# Patient Record
Sex: MALE | Race: BLACK OR AFRICAN AMERICAN | NOT HISPANIC OR LATINO | Employment: UNEMPLOYED | URBAN - METROPOLITAN AREA
[De-identification: names, ages, dates, MRNs, and addresses within clinical notes are randomized per-mention and may not be internally consistent; named-entity substitution may affect disease eponyms.]

---

## 2019-02-14 ENCOUNTER — OFFICE VISIT (OUTPATIENT)
Dept: FAMILY MEDICINE | Facility: CLINIC | Age: 34
End: 2019-02-14
Payer: COMMERCIAL

## 2019-02-14 VITALS
DIASTOLIC BLOOD PRESSURE: 69 MMHG | HEART RATE: 64 BPM | OXYGEN SATURATION: 98 % | HEIGHT: 74 IN | SYSTOLIC BLOOD PRESSURE: 109 MMHG | BODY MASS INDEX: 27.59 KG/M2 | TEMPERATURE: 98.2 F | WEIGHT: 215 LBS | RESPIRATION RATE: 16 BRPM

## 2019-02-14 DIAGNOSIS — R21 RASH AND NONSPECIFIC SKIN ERUPTION: Primary | ICD-10-CM

## 2019-02-14 SDOH — HEALTH STABILITY: MENTAL HEALTH: HOW OFTEN DO YOU HAVE A DRINK CONTAINING ALCOHOL?: NOT ASKED

## 2019-02-14 ASSESSMENT — MIFFLIN-ST. JEOR: SCORE: 1989.98

## 2019-02-14 ASSESSMENT — ENCOUNTER SYMPTOMS
MUSCULOSKELETAL NEGATIVE: 1
RESPIRATORY NEGATIVE: 1
CONSTITUTIONAL NEGATIVE: 1
GASTROINTESTINAL NEGATIVE: 1
CARDIOVASCULAR NEGATIVE: 1

## 2019-02-14 NOTE — PATIENT INSTRUCTIONS
Here is the plan from today's visit    1. Rash and nonspecific skin eruption  - DERMATOLOGY REFERRAL - INTERNAL  [facility]: [facility] [phone]      Thank you for coming to [facility] today.  Lab Testing:  **If you had lab testing today and your results are reassuring or normal they will be mailed to you or sent through [facility] within 7 days.   **If the lab tests need quick action we will call you with the results.  The phone number we will call with results is # [phone] (home) . If this is not the best number please call our clinic and change the number.  Medication Refills:  If you need any refills please call your pharmacy and they will contact us.   If you need to  your refill at a new pharmacy, please contact the new pharmacy directly. The new pharmacy will help you get your medications transferred faster.   Scheduling:  If you have any concerns about today's visit or wish to schedule another appointment please call our office during normal business hours [phone] (8-5:00 M-F)  If a referral was made to a [facility] and you don't get a call from central scheduling please call [phone].  If a Mammogram was ordered for you at [facility] call [phone] to schedule or change your appointment.  If you had an XRay/CT/Ultrasound/MRI ordered the number is [phone] to schedule or change your radiology appointment.   Medical Concerns:  If you have urgent medical concerns please call [phone] at any time of the day.    Doris Loera MD

## 2019-02-14 NOTE — PROGRESS NOTES
MARIAMA Sandhu is a 33 year old  who presents for   Chief Complaint   Patient presents with     Derm Problem     on arms since 2012       Rash/Lesion  Onset: 2012, while living in [place] he developed this rash. Initially it was on various body parts intermittently coming and going, including face, arms and legs. For the past 3 years it has persistently been on his right forearm and no where else.      Description:   Location: right forearm  Color: skin colored with slight erythema and scaliness   Character: small raised papules, persistent  Itching (Pruritis): Yes Details: worse in the cold  Pain?:no    Progression of Symptoms:  same    Accompanying Signs & Symptoms:  Fever: no  Body aches or joint pain:  no  Sore throat symptoms:no  Recent cold symptoms: no    History:   Previous similar rash: Yes Details: ongoing for many years    Precipitating factors:   Exposure to similar rash: no  New exposures: {no  Recent travel: Yes Details: recently moved here from [place]  New Medication: no    What makes it better?:  nothing  Therapies Tried and outcome:  Has been prescribed various creams, Details: no change    +++++++    Problem, Medication and Allergy Lists were reviewed and updated if needed..    Patient is a new patient to this clinic and so  I reviewed/updated the Past Medical History, the Family History and the Social History .  Family History : Mother, father and siblings healthy.  Social History : Moved to the  from [facility] a few years ago, moved to [place] from [place] 1 month ago. , no children. Non-smoker.         Review of Systems:   Review of Systems   Constitutional: Negative.    HENT: Negative.    Respiratory: Negative.    Cardiovascular: Negative.    Gastrointestinal: Negative.    Musculoskeletal: Negative.    Skin: Positive for rash.            Physical Exam:     Vitals:    02/14/19 1601   BP: 109/69   Pulse: 64   Resp: 16   Temp: 98.2  F (36.8  C)   TempSrc: Oral   SpO2: 98%  "  Weight: 97.5 kg (215 lb)   Height: 1.88 m (6' 2\")     Body mass index is 27.6 kg/m .  Vitals were reviewed and were normal     Physical Exam   Constitutional: He is oriented to person, place, and time. He appears well-developed and well-nourished. No distress.   HENT:   Head: Normocephalic and atraumatic.   Eyes: EOM are normal.   Neck: Normal range of motion.   Cardiovascular: Normal rate and regular rhythm.   No murmur heard.  Pulmonary/Chest: Effort normal and breath sounds normal. No respiratory distress.   Musculoskeletal: Normal range of motion.   Neurological: He is alert and oriented to person, place, and time.   Skin: Skin is warm and dry.   Right forearm with skin colored round papules coalescing into plaques, flaky. No other areas affected.    Nursing note and vitals reviewed.        Results:   No testing ordered today    Assessment and Plan        1. Rash and nonspecific skin eruption  Long standing pruritic and flaky skin- colored plaque-like rash on right forearm, has tried various prescribed creams (he cannot remember what kinds, ERIN signed) without relief. Etiology unknown, will refer to dermatology.  - DERMATOLOGY REFERRAL - INTERNAL       There are no discontinued medications.    Options for treatment and follow-up care were reviewed with the patient. Thomas Sandhu  engaged in the decision making process and verbalized understanding of the options discussed and agreed with the final plan.    Doris Loera MD  "

## 2019-02-22 ENCOUNTER — OFFICE VISIT (OUTPATIENT)
Dept: FAMILY MEDICINE | Facility: CLINIC | Age: 34
End: 2019-02-22
Attending: FAMILY MEDICINE
Payer: COMMERCIAL

## 2019-02-22 VITALS — HEART RATE: 82 BPM | SYSTOLIC BLOOD PRESSURE: 128 MMHG | DIASTOLIC BLOOD PRESSURE: 57 MMHG

## 2019-02-22 DIAGNOSIS — R21 RASH AND OTHER NONSPECIFIC SKIN ERUPTION: Primary | ICD-10-CM

## 2019-02-22 DIAGNOSIS — L29.9 LOCALIZED PRURITUS: ICD-10-CM

## 2019-02-22 LAB
KOH PREP SPEC: NORMAL
SPECIMEN SOURCE: NORMAL

## 2019-02-22 PROCEDURE — 99214 OFFICE O/P EST MOD 30 MIN: CPT | Performed by: PHYSICIAN ASSISTANT

## 2019-02-22 PROCEDURE — 87220 TISSUE EXAM FOR FUNGI: CPT | Performed by: PHYSICIAN ASSISTANT

## 2019-02-22 RX ORDER — FLUOCINONIDE 0.5 MG/G
CREAM TOPICAL
Qty: 60 G | Refills: 0 | Status: SHIPPED | OUTPATIENT
Start: 2019-02-22 | End: 2019-10-09

## 2019-02-22 NOTE — PATIENT INSTRUCTIONS
Will call with KOH and creams.     Proper skin care from [facility]:    -Eliminate harsh soaps as they strip the natural oils from the skin, often resulting in dry itchy skin ( i.e. Dial, Zest, [place] Spring)  -Use mild soaps such as Cetaphil or Dove Sensitive Skin in the shower. You do not need to use soap on arms, legs, and trunk every time you shower unless visibly soiled.   -Avoid hot or cold showers.  -After showering, lightly dry off and apply moisturizing within 2-3 minutes. This will help trap moisture in the skin.   -Aggressive use of a moisturizer at least 1-2 times a day to the entire body (including -Vanicream, Cetaphil, Aquaphor or Cerave) and moisturize hands after every washing.  -We recommend using moisturizers that come in a tub that needs to be scooped out, not a pump. This has more of an oil base. It will hold moisture in your skin much better than a water base moisturizer. The above recommended are non-pore clogging.       Follow up in 2 weeks  Wear a sunscreen with at least SPF 30 on your face, ears, neck and V of the chest daily. Wear sunscreen on other areas of the body if those areas are exposed to the sun throughout the day. Sunscreens can contain physical and/or chemical blockers. Physical blockers are less likely to clog pores, these include zinc oxide and titanium dioxide. Reapply every two hour and after swimming. Sunscreen examples include Neutrogena, CeraVe, Blue Lizard, Elta MD and many others.    UV radiation  UVA radiation remains constant throughout the day and throughout the year. It is a longer wavelength than UVB and therefore penetrates deeper into the skin leading to immediate and delayed tanning, photoaging, and skin cancer. 70-80% of UVA and UVB radiation occurs between the hours of 10am-2pm.  UVB radiation  UVB radiation causes the most harmful effects and is more significant during the summer months. However, snow and ice can reflect UVB radiation leading to skin  damage during the winter months as well. UVB radiation is responsible for tanning, burning, inflammation, delayed erythema (pinkness), pigmentation (brown spots), and skin cancer.   Just because you do not burn or are not developing a tan does not mean that you are not damaging your skin. A 15 minute drive to and from work for 30 years an lead to chronic sun damage of the skin. It is important to wear a broad spectrum (both UVA and UVB) sunscreen EVERY day with at least 30 SPF. Apply to face, ears, neck and v of the chest as this is where most of our sun exposure is. Reapply sunscreen every two hours if you plan on being outside.   Yoseph Hines. Clinical Dermatology: A Color Guide to Diagnosis and Therapy. Elsevier, 2016.

## 2019-02-22 NOTE — LETTER
2/22/2019         RE: Thomas Sandhu  [address]  [place] [address]        Dear Colleague,    [facility] you [facility] your [facility], [facility]. Please see a copy of my visit note below.    HPI:  Thomas Sandhu is a 33 year old male patient here today for rash on forearms .  Patient states this has been present for 6 years.  Patient reports the following symptoms: itch .  Patient reports the following previous treatments:rx cream for 2 months with no improvement. Unsure of cream.  Patient reports the following modifying factors: none.  Associated symptoms: none.  Lived in [place]. Developed rash when he moved to the . Patient has no other skin complaints today.  Remainder of the HPI, Meds, PMH, Allergies, FH, and SH was reviewed in chart.      History reviewed. No pertinent past medical history.    History reviewed. No pertinent surgical history.     History reviewed. No pertinent family history.    Social History     Socioeconomic History     Marital status: Single     Spouse name: Not on file     Number of children: Not on file     Years of education: Not on file     Highest education level: Not on file   Occupational History     Occupation:    Social Needs     Financial resource strain: Not on file     Food insecurity:     Worry: Not on file     Inability: Not on file     Transportation needs:     Medical: Not on file     Non-medical: Not on file   Tobacco Use     Smoking status: Never Smoker     Smokeless tobacco: Never Used   Substance and Sexual Activity     Alcohol use: No     Drug use: No     Sexual activity: Not on file   Lifestyle     Physical activity:     Days per week: Not on file     Minutes per session: Not on file     Stress: Not on file   Relationships     Social connections:     Talks on phone: Not on file     Gets together: Not on file     Attends Confucianist service: Not on file     Active member of club or organization: Not on file      Attends meetings of clubs or organizations: Not on file     Relationship status: Not on file     Intimate partner violence:     Fear of current or ex partner: Not on file     Emotionally abused: Not on file     Physically abused: Not on file     Forced sexual activity: Not on file   Other Topics Concern     Parent/sibling w/ CABG, MI or angioplasty before 65F 55M? Not Asked   Social History Narrative    12 bothers and sisters.         Moved to [place] 2019.        Lives with wife.        No outpatient encounter medications on file as of 2/22/2019.     No facility-administered encounter medications on file as of 2/22/2019.        Review Of Systems:  Skin: As above  Eyes: negative  Ears/Nose/Throat: negative  Respiratory: No shortness of breath, dyspnea on exertion, cough, or hemoptysis  Cardiovascular: negative  Gastrointestinal: negative  Genitourinary: negative  Musculoskeletal: negative  Neurologic: negative  Psychiatric: negative  Hematologic/Lymphatic/Immunologic: negative  Endocrine: negative      Objective:     /57   Pulse 82   Eyes: Conjunctivae/lids: Normal   ENT: Lips:  Normal  MSK: Normal  Cardiovascular: Peripheral edema none  Pulm: Breathing Normal  Neuro/Psych: Orientation: Normal; Mood/Affect: Normal, NAD, WDWN  Pt accompanied by: interpretor  Following areas examined: face, neck, forearms, distal arms, and hands  Deal skin type:v   Findings:  Brown and red scaly slightly raised coalescing plaques and patches on lateral forearms  koh or right forearm-neg  Assessment and Plan:  1) Atopic dermatitis vs psoriasis and localized pruritis  Disc possible etiologies     Lidex: Apply a thin layer to affected area BID x 2 weeks, tapering with improvement. Do not apply to face or body folds.   Side effects of topical steroids including but not limited to atrophy (skin thinning), striae (stretch marks) telangiectasias, steroid acne, and others. Do not apply to normal skin. Do not apply to  discolored skin that does not have rash present.     Proper skin care from [facility]:    -Eliminate harsh soaps as they strip the natural oils from the skin, often resulting in dry itchy skin ( i.e. Dial, Zest, [place] Spring)  -Use mild soaps such as Cetaphil or Dove Sensitive Skin in the shower. You do not need to use soap on arms, legs, and trunk every time you shower unless visibly soiled.   -Avoid hot or cold showers.  -After showering, lightly dry off and apply moisturizing within 2-3 minutes. This will help trap moisture in the skin.   -Aggressive use of a moisturizer at least 1-2 times a day to the entire body (including -Vanicream, Cetaphil, Aquaphor or Cerave) and moisturize hands after every washing.  -We recommend using moisturizers that come in a tub that needs to be scooped out, not a pump. This has more of an oil base. It will hold moisture in your skin much better than a water base moisturizer. The above recommended are non-pore clogging.             Follow up in 2 weeks      Again, thank you for allowing me to participate in the care of your patient.        Sincerely,        Olya Patel PA-C

## 2019-02-22 NOTE — PROGRESS NOTES
HPI:  Thomas Sandhu is a 33 year old male patient here today for rash on forearms .  Patient states this has been present for 6 years.  Patient reports the following symptoms: itch .  Patient reports the following previous treatments:rx cream for 2 months with no improvement. Unsure of cream.  Patient reports the following modifying factors: none.  Associated symptoms: none.  Lived in [place]. Developed rash when he moved to the . Patient has no other skin complaints today.  Remainder of the HPI, Meds, PMH, Allergies, FH, and SH was reviewed in chart.      History reviewed. No pertinent past medical history.    History reviewed. No pertinent surgical history.     History reviewed. No pertinent family history.    Social History     Socioeconomic History     Marital status: Single     Spouse name: Not on file     Number of children: Not on file     Years of education: Not on file     Highest education level: Not on file   Occupational History     Occupation:    Social Needs     Financial resource strain: Not on file     Food insecurity:     Worry: Not on file     Inability: Not on file     Transportation needs:     Medical: Not on file     Non-medical: Not on file   Tobacco Use     Smoking status: Never Smoker     Smokeless tobacco: Never Used   Substance and Sexual Activity     Alcohol use: No     Drug use: No     Sexual activity: Not on file   Lifestyle     Physical activity:     Days per week: Not on file     Minutes per session: Not on file     Stress: Not on file   Relationships     Social connections:     Talks on phone: Not on file     Gets together: Not on file     Attends Sikh service: Not on file     Active member of club or organization: Not on file     Attends meetings of clubs or organizations: Not on file     Relationship status: Not on file     Intimate partner violence:     Fear of current or ex partner: Not on file     Emotionally abused: Not on file     Physically abused: Not on file      Forced sexual activity: Not on file   Other Topics Concern     Parent/sibling w/ CABG, MI or angioplasty before 65F 55M? Not Asked   Social History Narrative    12 bothers and sisters.         Moved to [place] 2019.        Lives with wife.        No outpatient encounter medications on file as of 2/22/2019.     No facility-administered encounter medications on file as of 2/22/2019.        Review Of Systems:  Skin: As above  Eyes: negative  Ears/Nose/Throat: negative  Respiratory: No shortness of breath, dyspnea on exertion, cough, or hemoptysis  Cardiovascular: negative  Gastrointestinal: negative  Genitourinary: negative  Musculoskeletal: negative  Neurologic: negative  Psychiatric: negative  Hematologic/Lymphatic/Immunologic: negative  Endocrine: negative      Objective:     /57   Pulse 82   Eyes: Conjunctivae/lids: Normal   ENT: Lips:  Normal  MSK: Normal  Cardiovascular: Peripheral edema none  Pulm: Breathing Normal  Neuro/Psych: Orientation: Normal; Mood/Affect: Normal, NAD, WDWN  Pt accompanied by: interpretor  Following areas examined: face, neck, forearms, distal arms, and hands  Deal skin type:v   Findings:  Brown and red scaly slightly raised coalescing plaques and patches on lateral forearms  koh or right forearm-neg  Assessment and Plan:  1) Atopic dermatitis vs psoriasis and localized pruritis  Disc possible etiologies     Lidex: Apply a thin layer to affected area BID x 2 weeks, tapering with improvement. Do not apply to face or body folds.   Side effects of topical steroids including but not limited to atrophy (skin thinning), striae (stretch marks) telangiectasias, steroid acne, and others. Do not apply to normal skin. Do not apply to discolored skin that does not have rash present.     Proper skin care from [facility]:    -Eliminate harsh soaps as they strip the natural oils from the skin, often resulting in dry itchy skin ( i.e. Dial, Zest, [place] Spring)  -Use mild soaps  such as Cetaphil or Dove Sensitive Skin in the shower. You do not need to use soap on arms, legs, and trunk every time you shower unless visibly soiled.   -Avoid hot or cold showers.  -After showering, lightly dry off and apply moisturizing within 2-3 minutes. This will help trap moisture in the skin.   -Aggressive use of a moisturizer at least 1-2 times a day to the entire body (including -Vanicream, Cetaphil, Aquaphor or Cerave) and moisturize hands after every washing.  -We recommend using moisturizers that come in a tub that needs to be scooped out, not a pump. This has more of an oil base. It will hold moisture in your skin much better than a water base moisturizer. The above recommended are non-pore clogging.             Follow up in 2 weeks

## 2019-03-08 ENCOUNTER — OFFICE VISIT (OUTPATIENT)
Dept: FAMILY MEDICINE | Facility: CLINIC | Age: 34
End: 2019-03-08
Payer: COMMERCIAL

## 2019-03-08 VITALS — HEART RATE: 79 BPM | OXYGEN SATURATION: 99 % | DIASTOLIC BLOOD PRESSURE: 69 MMHG | SYSTOLIC BLOOD PRESSURE: 117 MMHG

## 2019-03-08 DIAGNOSIS — L29.9 LOCALIZED PRURITUS: ICD-10-CM

## 2019-03-08 DIAGNOSIS — R21 RASH AND OTHER NONSPECIFIC SKIN ERUPTION: Primary | ICD-10-CM

## 2019-03-08 LAB
KOH PREP SPEC: ABNORMAL
SPECIMEN SOURCE: ABNORMAL

## 2019-03-08 PROCEDURE — 99213 OFFICE O/P EST LOW 20 MIN: CPT | Performed by: PHYSICIAN ASSISTANT

## 2019-03-08 PROCEDURE — 87220 TISSUE EXAM FOR FUNGI: CPT | Performed by: PHYSICIAN ASSISTANT

## 2019-03-08 RX ORDER — FLUCONAZOLE 150 MG/1
TABLET ORAL
Qty: 14 TABLET | Refills: 0 | Status: SHIPPED | OUTPATIENT
Start: 2019-03-08 | End: 2019-04-04 | Stop reason: ALTCHOICE

## 2019-03-08 RX ORDER — KETOCONAZOLE 20 MG/G
CREAM TOPICAL
Qty: 60 G | Refills: 1 | Status: SHIPPED | OUTPATIENT
Start: 2019-03-08 | End: 2019-10-09

## 2019-03-08 NOTE — PROGRESS NOTES
HPI:  Thomas Sandhu is a 33 year old male patient here today for dermatitis on forearms .  Patient states this has been present for 6 years.  Patient reports the following symptoms: itch .  Patient reports the following previous treatments: TMC with no improvement. LOV lidex BID with complete resolution after one week but then flared with continued use. Pt denies rash anywhere else on the body.  LOV SHASHANK neg.  Patient reports the following modifying factors: none.  Associated symptoms: none.  Patient has no other skin complaints today.  Remainder of the HPI, Meds, PMH, Allergies, FH, and SH was reviewed in chart.      No past medical history on file.    No past surgical history on file.     No family history on file.    Social History     Socioeconomic History     Marital status: Single     Spouse name: Not on file     Number of children: Not on file     Years of education: Not on file     Highest education level: Not on file   Occupational History     Occupation:    Social Needs     Financial resource strain: Not on file     Food insecurity:     Worry: Not on file     Inability: Not on file     Transportation needs:     Medical: Not on file     Non-medical: Not on file   Tobacco Use     Smoking status: Never Smoker     Smokeless tobacco: Never Used   Substance and Sexual Activity     Alcohol use: No     Drug use: No     Sexual activity: Not on file   Lifestyle     Physical activity:     Days per week: Not on file     Minutes per session: Not on file     Stress: Not on file   Relationships     Social connections:     Talks on phone: Not on file     Gets together: Not on file     Attends Evangelical service: Not on file     Active member of club or organization: Not on file     Attends meetings of clubs or organizations: Not on file     Relationship status: Not on file     Intimate partner violence:     Fear of current or ex partner: Not on file     Emotionally abused: Not on file     Physically abused: Not on file      Forced sexual activity: Not on file   Other Topics Concern     Parent/sibling w/ CABG, MI or angioplasty before 65F 55M? Not Asked   Social History Narrative    12 bothers and sisters.         Moved to [place] 2019.        Lives with wife.        Outpatient Encounter Medications as of 3/8/2019   Medication Sig Dispense Refill     fluconazole (DIFLUCAN) 150 MG tablet Take one by mouth daily x 14 days 14 tablet 0     fluocinonide (LIDEX) 0.05 % external cream Apply a thin layer to affected area BID x 2 weeks, tapering with improvement. Do not apply to face or body folds. 60 g 0     ketoconazole (NIZORAL) 2 % external cream Apply BID to arms 60 g 1     No facility-administered encounter medications on file as of 3/8/2019.        Review Of Systems:  Skin: As above  Eyes: negative  Ears/Nose/Throat: negative  Respiratory: No shortness of breath, dyspnea on exertion, cough, or hemoptysis  Cardiovascular: negative  Gastrointestinal: negative  Genitourinary: negative  Musculoskeletal: negative  Neurologic: negative  Psychiatric: negative  Hematologic/Lymphatic/Immunologic: negative  Endocrine: negative      Objective:     /69   Pulse 79   SpO2 99%   Eyes: Conjunctivae/lids: Normal   ENT: Lips:  Normal  MSK: Normal  Cardiovascular: Peripheral edema none  Pulm: Breathing Normal  Neuro/Psych: Orientation: Normal; Mood/Affect: Normal, NAD, WDWN  Pt accompanied by: self  Following areas examined: face, neck, forearms, distal arms  Deal skin type:v   Findings:  Pink annular patches with trailing scale on lateral forearms and left lateral arm  Assessment and Plan:  1) Rash and nonspecific skin eruption and localized pruritis   Erythema annulare centrifugum vs tinea corporis vs psoriasis  Recommend biopsy today. Pt defers.  Another KOH today.  Begin diflucan by mouth.  Apply ketoconazole 2x a day to affected area.   Lidex. Apply a thin layer to affected area on forarms and arms 2x a day for 1 weeks. Tapering  with improvement. Do not apply to face or body folds.   Side effects of topical steroids including but not limited to atrophy (skin thinning), striae (stretch marks) telangiectasias, steroid acne, and others. Do not apply to normal skin. Do not apply to discolored skin that does not have rash present.     Proper skin care from [facility]:    -Eliminate harsh soaps as they strip the natural oils from the skin, often resulting in dry itchy skin ( i.e. Dial, Zest, [place] Spring)  -Use mild soaps such as Cetaphil or Dove Sensitive Skin in the shower. You do not need to use soap on arms, legs, and trunk every time you shower unless visibly soiled.   -Avoid hot or cold showers.  -After showering, lightly dry off and apply moisturizing within 2-3 minutes. This will help trap moisture in the skin.   -Aggressive use of a moisturizer at least 1-2 times a day to the entire body (including -Vanicream, Cetaphil, Aquaphor or Cerave) and moisturize hands after every washing.  -We recommend using moisturizers that come in a tub that needs to be scooped out, not a pump. This has more of an oil base. It will hold moisture in your skin much better than a water base moisturizer. The above recommended are non-pore clogging.      Follow up in 2 weeks

## 2019-03-08 NOTE — LETTER
3/8/2019         RE: Thomas Sandhu  [address]  [place] [address]        Dear Colleague,    Thank you for referring your patient, Thomas Sandhu, to the [facility] [place]. Please see a copy of my visit note below.    HPI:  Thomas Sandhu is a 33 year old male patient here today for dermatitis on forearms .  Patient states this has been present for 6 years.  Patient reports the following symptoms: itch .  Patient reports the following previous treatments: TMC with no improvement. LOV lidex BID with complete resolution after one week but then flared with continued use. Pt denies rash anywhere else on the body.  LOV SHASHANK neg.  Patient reports the following modifying factors: none.  Associated symptoms: none.  Patient has no other skin complaints today.  Remainder of the HPI, Meds, PMH, Allergies, FH, and SH was reviewed in chart.      No past medical history on file.    No past surgical history on file.     No family history on file.    Social History     Socioeconomic History     Marital status: Single     Spouse name: Not on file     Number of children: Not on file     Years of education: Not on file     Highest education level: Not on file   Occupational History     Occupation:    Social Needs     Financial resource strain: Not on file     Food insecurity:     Worry: Not on file     Inability: Not on file     Transportation needs:     Medical: Not on file     Non-medical: Not on file   Tobacco Use     Smoking status: Never Smoker     Smokeless tobacco: Never Used   Substance and Sexual Activity     Alcohol use: No     Drug use: No     Sexual activity: Not on file   Lifestyle     Physical activity:     Days per week: Not on file     Minutes per session: Not on file     Stress: Not on file   Relationships     Social connections:     Talks on phone: Not on file     Gets together: Not on file     Attends [employer] service: Not on file     Active member of club or organization: Not on file      Attends meetings of clubs or organizations: Not on file     Relationship status: Not on file     Intimate partner violence:     Fear of current or ex partner: Not on file     Emotionally abused: Not on file     Physically abused: Not on file     Forced sexual activity: Not on file   Other Topics Concern     Parent/sibling w/ CABG, MI or angioplasty before 65F 55M? Not Asked   Social History Narrative    12 bothers and sisters.         Moved to [place] 2019.        Lives with wife.        Outpatient Encounter Medications as of 3/8/2019   Medication Sig Dispense Refill     fluconazole (DIFLUCAN) 150 MG tablet Take one by mouth daily x 14 days 14 tablet 0     fluocinonide (LIDEX) 0.05 % external cream Apply a thin layer to affected area BID x 2 weeks, tapering with improvement. Do not apply to face or body folds. 60 g 0     ketoconazole (NIZORAL) 2 % external cream Apply BID to arms 60 g 1     No facility-administered encounter medications on file as of 3/8/2019.        Review Of Systems:  Skin: As above  Eyes: negative  Ears/Nose/Throat: negative  Respiratory: No shortness of breath, dyspnea on exertion, cough, or hemoptysis  Cardiovascular: negative  Gastrointestinal: negative  Genitourinary: negative  Musculoskeletal: negative  Neurologic: negative  Psychiatric: negative  Hematologic/Lymphatic/Immunologic: negative  Endocrine: negative      Objective:     /69   Pulse 79   SpO2 99%   Eyes: Conjunctivae/lids: Normal   ENT: Lips:  Normal  MSK: Normal  Cardiovascular: Peripheral edema none  Pulm: Breathing Normal  Neuro/Psych: Orientation: Normal; Mood/Affect: Normal, NAD, WDWN  Pt accompanied by: self  Following areas examined: face, neck, forearms, distal arms  Deal skin type:v   Findings:  Pink annular patches with trailing scale on lateral forearms and left lateral arm  Assessment and Plan:  1) Rash and nonspecific skin eruption and localized pruritis   Erythema annulare centrifugum vs tinea  corporis vs psoriasis  Recommend biopsy today. Pt defers.  Another KOH today.  Begin diflucan by mouth.  Apply ketoconazole 2x a day to affected area.   Lidex. Apply a thin layer to affected area on forarms and arms 2x a day for 1 weeks. Tapering with improvement. Do not apply to face or body folds.   Side effects of topical steroids including but not limited to atrophy (skin thinning), striae (stretch marks) telangiectasias, steroid acne, and others. Do not apply to normal skin. Do not apply to discolored skin that does not have rash present.     Proper skin care from [facility]:    -Eliminate harsh soaps as they strip the natural oils from the skin, often resulting in dry itchy skin ( i.e. Dial, Zest, [place] Spring)  -Use mild soaps such as Cetaphil or Dove Sensitive Skin in the shower. You do not need to use soap on arms, legs, and trunk every time you shower unless visibly soiled.   -Avoid hot or cold showers.  -After showering, lightly dry off and apply moisturizing within 2-3 minutes. This will help trap moisture in the skin.   -Aggressive use of a moisturizer at least 1-2 times a day to the entire body (including -Vanicream, Cetaphil, Aquaphor or Cerave) and moisturize hands after every washing.  -We recommend using moisturizers that come in a tub that needs to be scooped out, not a pump. This has more of an oil base. It will hold moisture in your skin much better than a water base moisturizer. The above recommended are non-pore clogging.      Follow up in 2 weeks      Again, thank you for allowing me to participate in the care of your patient.        Sincerely,        Olya Patel PA-C

## 2019-03-08 NOTE — PATIENT INSTRUCTIONS
Begin diflucan by mouth.  Will call you with results of koh  Consider biopsy   Apply ketoconazole 2x a day to affected area.   Lidex. Apply a thin layer to affected area on forarms and arms 2x a day for 1 weeks. Tapering with improvement. Do not apply to face or body folds.   Side effects of topical steroids including but not limited to atrophy (skin thinning), striae (stretch marks) telangiectasias, steroid acne, and others. Do not apply to normal skin. Do not apply to discolored skin that does not have rash present.     Proper skin care from [facility]:    -Eliminate harsh soaps as they strip the natural oils from the skin, often resulting in dry itchy skin ( i.e. Dial, Zest, [place] Spring)  -Use mild soaps such as Cetaphil or Dove Sensitive Skin in the shower. You do not need to use soap on arms, legs, and trunk every time you shower unless visibly soiled.   -Avoid hot or cold showers.  -After showering, lightly dry off and apply moisturizing within 2-3 minutes. This will help trap moisture in the skin.   -Aggressive use of a moisturizer at least 1-2 times a day to the entire body (including -Vanicream, Cetaphil, Aquaphor or Cerave) and moisturize hands after every washing.  -We recommend using moisturizers that come in a tub that needs to be scooped out, not a pump. This has more of an oil base. It will hold moisture in your skin much better than a water base moisturizer. The above recommended are non-pore clogging.

## 2019-03-11 ENCOUNTER — TELEPHONE (OUTPATIENT)
Dept: FAMILY MEDICINE | Facility: CLINIC | Age: 34
End: 2019-03-11

## 2019-03-11 DIAGNOSIS — B35.4 TINEA CORPORIS: Primary | ICD-10-CM

## 2019-03-11 RX ORDER — TERBINAFINE HYDROCHLORIDE 250 MG/1
250 TABLET ORAL DAILY
Qty: 14 TABLET | Refills: 0 | Status: SHIPPED | OUTPATIENT
Start: 2019-03-11 | End: 2019-03-25

## 2019-03-11 NOTE — TELEPHONE ENCOUNTER
Notes recorded by Olya Patel PA-C on 3/8/2019 at 5:18 PM CST  Fungus seen on the scraping we did today in clinic. Continue current tx as discussed.

## 2019-03-11 NOTE — TELEPHONE ENCOUNTER
"Patient called the clinic back stating that he still had more questions. He is also requesting a different  that \"knows more about skin so I can understand\".     Patient ph: [phone]    Freddy JACKSON  Patient Representative - Prior [facility]  "

## 2019-03-11 NOTE — TELEPHONE ENCOUNTER
"Called pharmacist at [facility] [phone]- patient insurance will only pay 2 tabs every two days- patient would need to come back to the pharmacy every two days to get the complete RX    Called patient with a different interpretor: he  states when he uses the cream he \"feels like his hand is falling off\"  States that it feels like a larger reaction- burning and redness and pain- advised not to use until we hear from Jenna  Explained about his insurance and the diflucan- patient has taken the two tabs that he received from pharmacy and [place]    Huddled with provider  Per Jenna Patel- she will send a different antifungal and he is to discontinue the Ketoconazole cream  Patient is very happy with this and will call pharmacy to  new script.    Tiff RickettsRN BSN  [facility]  [phone]            "

## 2019-03-11 NOTE — TELEPHONE ENCOUNTER
Patient returned called and requested an interpretor- completed 3 way with interpretor on line.  Patient notified of positive fungal results and treatment is appropriate  patient states pharmacy only gave him 2 tablets- advised that he will need to find someone to help him call the pharmacy and ask where the rest of the medication is. patient disconnected call - triage will call pharmacy and then cfall patient back

## 2019-03-11 NOTE — TELEPHONE ENCOUNTER
Called patient via [place] [employer]or services [phone]-     Left message on answering machine for patient to call back.    Tiff RickettsRN BSN  [facility]  [phone]

## 2019-04-04 ENCOUNTER — OFFICE VISIT (OUTPATIENT)
Dept: FAMILY MEDICINE | Facility: CLINIC | Age: 34
End: 2019-04-04
Payer: COMMERCIAL

## 2019-04-04 VITALS — DIASTOLIC BLOOD PRESSURE: 62 MMHG | SYSTOLIC BLOOD PRESSURE: 114 MMHG

## 2019-04-04 DIAGNOSIS — B35.4 TINEA CORPORIS: Primary | ICD-10-CM

## 2019-04-04 PROCEDURE — 99213 OFFICE O/P EST LOW 20 MIN: CPT | Performed by: FAMILY MEDICINE

## 2019-04-04 RX ORDER — TERBINAFINE HYDROCHLORIDE 250 MG/1
250 TABLET ORAL DAILY
Qty: 14 TABLET | Refills: 0 | Status: SHIPPED | OUTPATIENT
Start: 2019-04-04 | End: 2019-10-13

## 2019-04-04 NOTE — PATIENT INSTRUCTIONS
FUTURE APPOINTMENTS  Follow up as needed if rash continues to develop.    Areas of darker pigmentation will take more time to slowly fade.    ORAL ANTIFUNGAL  Take by mouth 1 tablet of terbinafine (Lamisil) 250 mg one time(s) a day for 2 more weeks. Make sure to finish the entire antifungal regimen.

## 2019-04-04 NOTE — LETTER
"    4/4/2019         RE: Thomas Sandhu  [address]  [place] [address]        Dear Colleague,    Thank you for referring your patient, Thomas Sandhu, to the [facility]. Please see a copy of my visit note below.    [facility] - PRIMARY CARE SKIN    CC: Rash  SUBJECTIVE:   Thomas Sandhu is a(n) 33 year old male who presents to clinic today because of a(n) rash on the right forearm which started in 2012. Rash on the legs has also waxed and waned.    He was initially treated with Lidex. While KOH from 2/22/19 was negative, SHASHANK from 3/8/19 indicated fungal elements, so therapy was changed to antifungal treatments. Symptoms are improving with oral treatment.    Itchiness: YES. He has scratched at the affected areas.    Therapies tried: terbinafine, fluconazole, ketoconazole 2% cream, fluocinonide 0.05% cream.  He reports \"damage from his skin\" described as \"an allergy something\" with use of fluocinonide 0.05% cream when used twice daily. Ketoconazole 2% cream was ineffective.    Recent exposure history: none known.  Previous history of a similar rash: YES - chronic issue.    Personal Medical History  Skin cancer: NO  Eczema Psoriasis Autoimmune   NO NO NO     Family Medical History  Skin cancer: NO  Eczema Psoriasis Autoimmune   NO NO NO     Occupation:  (both indoor & outdoor).    There is no problem list on file for this patient.      History reviewed. No pertinent past medical history. History reviewed. No pertinent surgical history.   Social History     Tobacco Use     Smoking status: Never Smoker     Smokeless tobacco: Never Used   Substance Use Topics     Alcohol use: No     Drug use: No    Family History     No data available           Current Outpatient Medications   Medication Sig Dispense Refill     fluocinonide (LIDEX) 0.05 % external cream Apply a thin layer to affected area BID x 2 weeks, tapering with improvement. Do not apply to face or body folds. 60 g 0     " ketoconazole (NIZORAL) 2 % external cream Apply BID to arms 60 g 1     terbinafine (LAMISIL) 250 MG tablet Take 1 tablet (250 mg) by mouth daily for 14 days 14 tablet 0       No Known Allergies     INTEGUMENTARY/SKIN: POSITIVE for pruritus and rash  ROS: 14 point review of systems was negative except the symptoms listed above in the HPI.    This document serves as a record of the services and decisions personally performed and made by Yvette Ellison MD and was created by Alonso Fisher, a trained medical scribe, based on personal observations and provider statements to the medical scribe.  April 4, 2019 2:07 PM   Alonso Fisher    OBJECTIVE:   GENERAL: healthy, alert and no distress.  SKIN: Deal Skin Type - VI.  Face, Neck, Arms and Legs examined. The dermatoscope was used to help evaluate pigmented lesions.  Skin Pertinent Findings:  Right forearm: scattered maculopapular eruption with post-inflammatory hyperpigmentation. Some patches look slightly circular in nature with slight raised border. No pustules.    Left mid-arm: residual post-inflammatory hyperpigmentation    Diagnostic Test Results:  none     ASSESSMENT:     Encounter Diagnosis   Name Primary?     Tinea corporis Yes     .    PLAN:   Patient Instructions   FUTURE APPOINTMENTS  Follow up as needed if rash continues to develop.    Areas of darker pigmentation will take more time to slowly fade.    ORAL ANTIFUNGAL  Take by mouth 1 tablet of terbinafine (Lamisil) 250 mg one time(s) a day for 2 more weeks. Make sure to finish the entire antifungal regimen.    TT: 25 minutes.  CT: 20 minutes.    The information in this document, created by the medical scribe for me, accurately reflects the services I personally performed and the decisions made by me. I have reviewed and approved this document for accuracy prior to leaving the patient care area.  April 4, 2019 2:07 PM  Yvette Ellison MD  [facility]    Again, thank you for allowing me  to participate in the care of your patient.        Sincerely,        Yvette Ellison MD

## 2019-04-04 NOTE — PROGRESS NOTES
"[facility] - PRIMARY CARE SKIN    CC: Rash  SUBJECTIVE:   Thomas Sandhu is a(n) 33 year old male who presents to clinic today because of a(n) rash on the right forearm which started in 2012. Rash on the legs has also waxed and waned.    He was initially treated with Lidex. While KOH from 2/22/19 was negative, SHASHANK from 3/8/19 indicated fungal elements, so therapy was changed to antifungal treatments. Symptoms are improving with oral treatment.    Itchiness: YES. He has scratched at the affected areas.    Therapies tried: terbinafine, fluconazole, ketoconazole 2% cream, fluocinonide 0.05% cream.  He reports \"damage from his skin\" described as \"an allergy something\" with use of fluocinonide 0.05% cream when used twice daily. Ketoconazole 2% cream was ineffective.    Recent exposure history: none known.  Previous history of a similar rash: YES - chronic issue.    Personal Medical History  Skin cancer: NO  Eczema Psoriasis Autoimmune   NO NO NO     Family Medical History  Skin cancer: NO  Eczema Psoriasis Autoimmune   NO NO NO     Occupation:  (both indoor & outdoor).    There is no problem list on file for this patient.      History reviewed. No pertinent past medical history. History reviewed. No pertinent surgical history.   Social History     Tobacco Use     Smoking status: Never Smoker     Smokeless tobacco: Never Used   Substance Use Topics     Alcohol use: No     Drug use: No    Family History     No data available           Current Outpatient Medications   Medication Sig Dispense Refill     fluocinonide (LIDEX) 0.05 % external cream Apply a thin layer to affected area BID x 2 weeks, tapering with improvement. Do not apply to face or body folds. 60 g 0     ketoconazole (NIZORAL) 2 % external cream Apply BID to arms 60 g 1     terbinafine (LAMISIL) 250 MG tablet Take 1 tablet (250 mg) by mouth daily for 14 days 14 tablet 0       No Known Allergies     INTEGUMENTARY/SKIN: POSITIVE for pruritus and " rash  ROS: 14 point review of systems was negative except the symptoms listed above in the HPI.    This document serves as a record of the services and decisions personally performed and made by Yvette Ellison MD and was created by Alonso Fisher, a trained medical scribe, based on personal observations and provider statements to the medical scribe.  April 4, 2019 2:07 PM   Alonso Fisher    OBJECTIVE:   GENERAL: healthy, alert and no distress.  SKIN: Deal Skin Type - VI.  Face, Neck, Arms and Legs examined. The dermatoscope was used to help evaluate pigmented lesions.  Skin Pertinent Findings:  Right forearm: scattered maculopapular eruption with post-inflammatory hyperpigmentation. Some patches look slightly circular in nature with slight raised border. No pustules.    Left mid-arm: residual post-inflammatory hyperpigmentation    Diagnostic Test Results:  none     ASSESSMENT:     Encounter Diagnosis   Name Primary?     Tinea corporis Yes     .    PLAN:   Patient Instructions   FUTURE APPOINTMENTS  Follow up as needed if rash continues to develop.    Areas of darker pigmentation will take more time to slowly fade.    ORAL ANTIFUNGAL  Take by mouth 1 tablet of terbinafine (Lamisil) 250 mg one time(s) a day for 2 more weeks. Make sure to finish the entire antifungal regimen.    TT: 25 minutes.  CT: 20 minutes.    The information in this document, created by the medical scribe for me, accurately reflects the services I personally performed and the decisions made by me. I have reviewed and approved this document for accuracy prior to leaving the patient care area.  April 4, 2019 2:07 PM  Yvette Ellison MD  [facility]

## 2019-05-23 ENCOUNTER — OFFICE VISIT (OUTPATIENT)
Dept: FAMILY MEDICINE | Facility: CLINIC | Age: 34
End: 2019-05-23
Payer: COMMERCIAL

## 2019-05-23 VITALS
RESPIRATION RATE: 16 BRPM | HEIGHT: 74 IN | HEART RATE: 71 BPM | TEMPERATURE: 97.6 F | SYSTOLIC BLOOD PRESSURE: 120 MMHG | WEIGHT: 208.6 LBS | OXYGEN SATURATION: 100 % | BODY MASS INDEX: 26.77 KG/M2 | DIASTOLIC BLOOD PRESSURE: 76 MMHG

## 2019-05-23 DIAGNOSIS — G89.29 CHRONIC BILATERAL LOW BACK PAIN WITHOUT SCIATICA: Primary | ICD-10-CM

## 2019-05-23 DIAGNOSIS — K59.00 CONSTIPATION, UNSPECIFIED CONSTIPATION TYPE: ICD-10-CM

## 2019-05-23 DIAGNOSIS — M54.50 CHRONIC BILATERAL LOW BACK PAIN WITHOUT SCIATICA: Primary | ICD-10-CM

## 2019-05-23 RX ORDER — POLYETHYLENE GLYCOL 3350 17 G/17G
1 POWDER, FOR SOLUTION ORAL DAILY
Qty: 30 PACKET | Refills: 3 | Status: SHIPPED | OUTPATIENT
Start: 2019-05-23 | End: 2019-10-09

## 2019-05-23 ASSESSMENT — MIFFLIN-ST. JEOR: SCORE: 1949.95

## 2019-05-23 ASSESSMENT — ENCOUNTER SYMPTOMS
ABDOMINAL PAIN: 0
CONSTIPATION: 1
BLOOD IN STOOL: 0

## 2019-05-23 NOTE — PATIENT INSTRUCTIONS
- You may take ibuprofen (600mg = 3 tablets of over the counter) as needed for pain every 6 hours  - Physical therapy referral   - If you do not have improvement in pain, return in 6 weeks for further work-up, likely will get an MRI   - New medication for constipation as needed

## 2019-05-23 NOTE — NURSING NOTE
Due to patient being non-English speaking/uses sign language, an  was used for this visit. Only for face-to-face interpretation by an external agency, date and length of interpretation can be found on the scanned worksheet.     name: Gabriel Loza  Agency: [employer]  Language: Niuean   Telephone number: [phone]  Type of interpretation: Face-to-face, spoken    Law SRAVANTHI Kelly

## 2019-05-23 NOTE — PROGRESS NOTES
"       MAR[place]       Thomas Sandhu is a 34 year old  who presents for   Chief Complaint   Patient presents with     Back Pain     lower back right side is painful about three years already        Back Pain      Duration: 3 years ago        Specific cause: lifting    Description:   Location of pain: low back right  Character of pain: pinching, tolerable pain, but present all the time  Pain radiation:none  New numbness or weakness in legs, not attributed to pain:  No   Any new bowel or bladder incontinence?No     Intensity: Currently 5/10, at its worst 6/10    History:   Pain interferes with job/home/school: No - works as a   History of back problems: had imaging done in 2016/2017 in [place] - had CT. No kidney infection, went to physical therapy - did not complete, went only a couple of times and did not notice a difference   Any previous MRI or X-rays: CT at [place] in [place]  Sees a specialist for back pain:  No  Therapies tried without relief: massage, physical therapy     Alleviating factors:   Improved by: standing, stretching    Precipitating factors:  Worsened by: sitting, laying down, lifting     Accompanying Signs & Symptoms:  Risk of Fracture: No   Risk of Cauda Equina:No   Risk of Infection:  No   Risk of Cancer:  No     A [employer]  was used for  this visit.    +++++++  Problem, Medication and Allergy Lists were reviewed and updated if needed..    Patient is an established patient of this clinic..         Review of Systems:   Review of Systems   Gastrointestinal: Positive for constipation. Negative for abdominal pain and blood in stool.          Physical Exam:     Vitals:    05/23/19 1455   BP: 120/76   Pulse: 71   Resp: 16   Temp: 97.6  F (36.4  C)   TempSrc: Oral   SpO2: 100%   Weight: 94.6 kg (208 lb 9.6 oz)   Height: 1.87 m (6' 1.62\")     Body mass index is 27.06 kg/m .  Vitals were reviewed and were normal     Physical Exam   Constitutional: He appears well-developed and well-nourished. " No distress.   Cardiovascular: Normal rate.   Pulmonary/Chest: Effort normal.   Musculoskeletal: Normal range of motion. He exhibits no edema or deformity.        Lumbar back: He exhibits pain. He exhibits no tenderness, no bony tenderness, no swelling and no spasm.   Negative slump test bilaterally, normal strength in bilateral lower extremities.   Neurological: He is alert. He displays normal reflexes. No sensory deficit. He exhibits normal muscle tone. Coordination normal.   Skin: He is not diaphoretic.   Vitals reviewed.      Results:   No testing ordered today    Assessment and Plan        1. Chronic bilateral low back pain without sciatica  Discussed with patient about treatment options.  Recommended conservative management for 6 weeks.  Patient declined wanting any medication for pain ibuprofen worsens his constipation.  As patient is concerned about constipation, would not recommend Flexeril given anticholinergic side effects.  Discussed with patient about using ice or heat to the area as needed.  Patient agreeable to physical therapy, counseled patient on importance of continuing exercises at home to ensure improvement.  Patient will return in 6 weeks for reevaluation, if pain continues despite maximum physical therapy and conservative management, then will consider imaging at that time, possibly MRI.  - [facility] PT, HAND [facility] CH[facility]OPRACTIC REFERRAL - [facility]; Future    2. Constipation, unspecified constipation type  Patient complaint of constipation, is afraid to take medications as he worries that they worsen constipation.  Patient agreeable to medication to treat constipation, MiraLAX prescribed.  - polyethylene glycol (MIRALAX/GLYCOLAX) packet; Take 17 g by mouth daily  Dispense: 30 packet; Refill: 3       There are no discontinued medications.    Options for treatment and follow-up care were reviewed with the patient. Thomas Sandhu  engaged in the decision making process and verbalized understanding of the  options discussed and agreed with the final plan.    Rema Verduzco, DO

## 2019-07-05 NOTE — PROGRESS NOTES
Preceptor Attestation:   Patient seen, evaluated and discussed with the resident. I have verified the content of the note, which accurately reflects my assessment of the patient and the plan of care.   Supervising Physician:  [place]iana West MD      Pt currently eating, appetite not at baseline, no special requests at this time, anticipate improved po as medical condition improves  No changes desire, will continue to monitor

## 2019-10-09 ENCOUNTER — ANCILLARY PROCEDURE (OUTPATIENT)
Dept: GENERAL RADIOLOGY | Facility: CLINIC | Age: 34
End: 2019-10-09
Attending: FAMILY MEDICINE
Payer: COMMERCIAL

## 2019-10-09 ENCOUNTER — OFFICE VISIT (OUTPATIENT)
Dept: FAMILY MEDICINE | Facility: CLINIC | Age: 34
End: 2019-10-09
Payer: COMMERCIAL

## 2019-10-09 VITALS
DIASTOLIC BLOOD PRESSURE: 74 MMHG | TEMPERATURE: 98.2 F | RESPIRATION RATE: 16 BRPM | WEIGHT: 215.6 LBS | BODY MASS INDEX: 27.97 KG/M2 | HEART RATE: 58 BPM | OXYGEN SATURATION: 99 % | SYSTOLIC BLOOD PRESSURE: 108 MMHG

## 2019-10-09 DIAGNOSIS — G89.29 CHRONIC BILATERAL LOW BACK PAIN WITHOUT SCIATICA: Primary | ICD-10-CM

## 2019-10-09 DIAGNOSIS — Z00.00 HEALTHCARE MAINTENANCE: ICD-10-CM

## 2019-10-09 DIAGNOSIS — Z13.1 SCREENING FOR DIABETES MELLITUS: ICD-10-CM

## 2019-10-09 DIAGNOSIS — M54.50 CHRONIC BILATERAL LOW BACK PAIN WITHOUT SCIATICA: Primary | ICD-10-CM

## 2019-10-09 DIAGNOSIS — G89.29 CHRONIC BILATERAL LOW BACK PAIN WITHOUT SCIATICA: ICD-10-CM

## 2019-10-09 DIAGNOSIS — M54.50 CHRONIC BILATERAL LOW BACK PAIN WITHOUT SCIATICA: ICD-10-CM

## 2019-10-09 DIAGNOSIS — Z13.220 SCREENING FOR CHOLESTEROL LEVEL: ICD-10-CM

## 2019-10-09 LAB
CHOLEST SERPL-MCNC: 190.4 MG/DL (ref 0–200)
CHOLEST/HDLC SERPL: 5 {RATIO} (ref 0–5)
GLUCOSE CASUAL: 87 MG/DL (ref 51–200)
HDLC SERPL-MCNC: 37.7 MG/DL
LDLC SERPL CALC-MCNC: 127 MG/DL (ref 0–129)
TRIGL SERPL-MCNC: 126.4 MG/DL (ref 0–150)
VLDL CHOLESTEROL: 25.3 MG/DL (ref 7–32)

## 2019-10-09 NOTE — PROGRESS NOTES
MARIAMA Sandhu is a 34 year old year old male with a history of  has no past medical history on file. who presents for Physical    Back pain for 5 years. Pinching pain. No numbness or tingling. Thinks it all started from a soccer injury when someone kicked him. Sitting is the worst, especially driving. Walking doesn't bother him. Bending doesn't change the pain. Was seen in May for this problem and was sent to physical therapy, and now would like imaging. Went to therapy for 2 months and feels like it didn't help at all. Pain hasn't changed much since it started. Not taking any pain medications. Has tried ibuprofen and tylenol but these haven't helped. Has used a cream too in the past without relief. He works as a  and has longer shifts driving.     A VSE EVAKUATORY ROSSII  was used for  this visit.    +++++++    Problem, Medication and Allergy Lists were reviewed and updated if needed.    Patient is an established patient of this clinic.         Review of Systems:   Review of Systems  A 6 point review of systems was performed and was negative except as noted above.         Physical Exam:   /74   Pulse 58   Temp 98.2  F (36.8  C) (Oral)   Resp 16   Wt 97.8 kg (215 lb 9.6 oz)   SpO2 99%   BMI 27.97 kg/m    Body mass index is 27.97 kg/m .    Vitals were reviewed and were normal     Physical Exam  Constitutional:       General: He is not in acute distress.     Appearance: He is well-developed.   HENT:      Head: Normocephalic and atraumatic.   Eyes:      General: No scleral icterus.     Conjunctiva/sclera: Conjunctivae normal.   Cardiovascular:      Rate and Rhythm: Normal rate and regular rhythm.      Heart sounds: Normal heart sounds. No murmur. No friction rub. No gallop.    Pulmonary:      Effort: Pulmonary effort is normal. No respiratory distress.      Breath sounds: Normal breath sounds.   Musculoskeletal: Normal range of motion.         General: No swelling or tenderness.       Comments: Back exam:  No tenderness to palpation; he points to paraspinal area over thoracic region when asked where pain is  Negative straight leg raise test bilaterally  Normal ROM   Neurological:      Mental Status: He is alert and oriented to person, place, and time.           Results:      Results from this visit  Results for orders placed or performed in visit on 10/09/19   Glucose Casual ([facility])   Result Value Ref Range    Glucose Casual 87.0 51.0 - 200.0 mg/dL   Lipid [place] ([facility])   Result Value Ref Range    Cholesterol 190.4 0.0 - 200.0 mg/dL    Cholesterol/HDL Ratio 5.0 (H) 0.0 - 5.0    HDL Cholesterol 37.7 (L) >40.0 mg/dL    Triglycerides 126.4 0.0 - 150.0 mg/dL    VLDL Cholesterol 25.3 7.0 - 32.0 mg/dL    LDL Cholesterol Calculated 127 0 - 129 mg/dL   TSH with free T4 reflex   Result Value Ref Range    TSH 7.46 (H) 0.40 - 4.00 mU/L   T4 free   Result Value Ref Range    T4 Free 0.93 0.76 - 1.46 ng/dL       Assessment and Plan        Thomas was seen today for physical.    Diagnoses and all orders for this visit:    Chronic bilateral low back pain without sciatica  Thomas has a long history of this back pain, and exam was benign.  I suspect his pain is musculoskeletal in origin, and likely exacerbated by his profession as a .  We discussed ergonomic changes he could make in his job including lumbar support, and talking to the ergonomics people at his work, but he does not think this would be helpful and reports having tried the lumbar support already.  He has gone to physical therapy 2 times for this pain in the last 5 years, and it is unclear how consistently he went however he denies any helpfulness from these visits.  We had a long discussion about the pros and cons of imaging today, and discussed that even if we did imaging its highly unlikely that we would find a reason for his pain.  He elected to proceed with an x-ray today for further evaluation and declined any pain medications  or physical therapy at this time.  He is planning to return next week to go over the results of his x-ray in clinic.  -     XR Thoracic Lumbar Spine 2 Views; Future    Healthcare maintenance  Patient requested to have his thyroid checked, and this was abnormal at one-point in the past, so will check today.  He also has a history of constipation.  -     TSH with free T4 reflex  -     T4 free  -     T4 free    Screening for diabetes mellitus  -     Glucose Casual ([facility])    Screening for cholesterol level  -     Lipid [place] ([facility])      There are no discontinued medications.    Options for treatment and follow-up care were reviewed with the patient. Thomas Sandhu engaged in the decision making process and verbalized understanding of the options discussed and agreed with the final plan.    Jose Nava MD

## 2019-10-09 NOTE — PROGRESS NOTES
Preceptor Attestation:   Patient seen, evaluated and discussed with the resident. I have verified the content of the note, which accurately reflects my assessment of the patient and the plan of care.   Supervising Physician:  Heri Portillo MD

## 2019-10-10 LAB
T4 FREE SERPL-MCNC: 0.93 NG/DL (ref 0.76–1.46)
TSH SERPL DL<=0.005 MIU/L-ACNC: 7.46 MU/L (ref 0.4–4)

## 2019-10-16 ENCOUNTER — TELEPHONE (OUTPATIENT)
Dept: FAMILY MEDICINE | Facility: CLINIC | Age: 34
End: 2019-10-16

## 2019-10-16 DIAGNOSIS — G89.29 CHRONIC BILATERAL LOW BACK PAIN WITHOUT SCIATICA: Primary | ICD-10-CM

## 2019-10-16 DIAGNOSIS — M54.50 CHRONIC BILATERAL LOW BACK PAIN WITHOUT SCIATICA: Primary | ICD-10-CM

## 2019-10-16 NOTE — TELEPHONE ENCOUNTER
Routing to Dr. Nava to review results and advise on patient request for pain medications.  Sameera Han RN

## 2019-10-16 NOTE — TELEPHONE ENCOUNTER
[facility] phone call message- patient requesting results.    Test: Lab    Date of test: 10/9/19    Additional Comments: Patient calling to obtain lab results. Patient also stated he would like a pain medication medication prescribed if his results are negative. Patient is a  and will be leaving the state this weekend, but clinic does not have any openings for patient to be seen.     Lab tab checked to verify if result comment present with in each order: Yes    Letters tab checked to verify if lab result letter has been entered: Yes    OK to leave a message on voice mail? Yes    Advised patient response may take up to 2 business days: Yes    Primary language: Vietnamese      needed? Yes    Call taken on October 16, 2019 at 1:03 PM by Jeannie Mcbride to [facility]

## 2019-10-17 ENCOUNTER — OFFICE VISIT (OUTPATIENT)
Dept: FAMILY MEDICINE | Facility: CLINIC | Age: 34
End: 2019-10-17
Payer: COMMERCIAL

## 2019-10-17 VITALS
RESPIRATION RATE: 16 BRPM | HEIGHT: 73 IN | OXYGEN SATURATION: 99 % | HEART RATE: 68 BPM | SYSTOLIC BLOOD PRESSURE: 113 MMHG | DIASTOLIC BLOOD PRESSURE: 73 MMHG | BODY MASS INDEX: 28.57 KG/M2 | TEMPERATURE: 98.3 F | WEIGHT: 215.6 LBS

## 2019-10-17 DIAGNOSIS — K59.09 OTHER CONSTIPATION: ICD-10-CM

## 2019-10-17 DIAGNOSIS — M40.30 ACQUIRED FLAT BACK SYNDROME: ICD-10-CM

## 2019-10-17 DIAGNOSIS — E03.8 SUBCLINICAL HYPOTHYROIDISM: ICD-10-CM

## 2019-10-17 DIAGNOSIS — M54.50 CHRONIC BILATERAL LOW BACK PAIN WITHOUT SCIATICA: Primary | ICD-10-CM

## 2019-10-17 DIAGNOSIS — Q18.1 CYST ON EAR: ICD-10-CM

## 2019-10-17 DIAGNOSIS — G89.29 CHRONIC BILATERAL LOW BACK PAIN WITHOUT SCIATICA: Primary | ICD-10-CM

## 2019-10-17 RX ORDER — LIDOCAINE 40 MG/G
CREAM TOPICAL
Qty: 120 G | Refills: 3 | Status: SHIPPED | OUTPATIENT
Start: 2019-10-17 | End: 2020-01-27

## 2019-10-17 ASSESSMENT — ENCOUNTER SYMPTOMS
DIARRHEA: 0
APPETITE CHANGE: 0
NAUSEA: 0
ROS SKIN COMMENTS: DRY SKIN
WHEEZING: 0
FEVER: 0
ACTIVITY CHANGE: 0
UNEXPECTED WEIGHT CHANGE: 0
BACK PAIN: 1
PALPITATIONS: 0
DECREASED CONCENTRATION: 0
SLEEP DISTURBANCE: 0
CHILLS: 0
CONSTIPATION: 1
COUGH: 0
NERVOUS/ANXIOUS: 0
FATIGUE: 0
DIZZINESS: 0
SHORTNESS OF BREATH: 0
VOMITING: 0
CONFUSION: 0
ABDOMINAL PAIN: 0
HEADACHES: 0

## 2019-10-17 ASSESSMENT — MIFFLIN-ST. JEOR: SCORE: 1971.84

## 2019-10-17 NOTE — PROGRESS NOTES
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    A [employer]  was used for  this visit.    +++++++    Problem, Medication and Allergy Lists were reviewed and updated if needed..    Patient is an established patient of this clinic..         Review of Systems:   Review of Systems   Constitutional: Negative for activity change, appetite change, chills, fatigue, fever and unexpected weight change.   HENT: Positive for ear pain (pimple back of L ear). Negative for ear discharge and hearing loss.    Respiratory: Negative for cough, shortness of breath and wheezing.    Cardiovascular: Negative for chest pain, palpitations and leg swelling.   Gastrointestinal: Positive for constipation. Negative for abdominal pain, diarrhea, nausea and vomiting.   Musculoskeletal: Positive for back pain.   Skin:        Dry skin   Neurological: Negative for dizziness and headaches.   Psychiatric/Behavioral: Negative for confusion, decreased concentration and sleep disturbance. The patient is not nervous/anxious.             Physical Exam:     Vitals:    10/17/19 1536   BP: 113/73   Pulse: 68   Resp: 16   Temp: 98.3  F (36.8  C)   TempSrc: Oral " "  SpO2: 99%   Weight: 97.8 kg (215 lb 9.6 oz)   Height: 1.854 m (6' 1\")     Body mass index is 28.44 kg/m .  Vitals were reviewed and were normal     Physical Exam  Constitutional:       General: He is not in acute distress.     Appearance: Normal appearance. He is not ill-appearing.   HENT:      Head: Normocephalic and atraumatic.      Ears:     Pulmonary:      Effort: Pulmonary effort is normal. No respiratory distress.   Abdominal:      Tenderness: There is no tenderness.      Comments: Abdominal fullness   Skin:     General: Skin is warm and dry.   Neurological:      General: No focal deficit present.      Mental Status: He is alert and oriented to person, place, and time.   Psychiatric:         Mood and Affect: Mood normal.         Results:   Results from last visit:  Office Visit on 10/09/2019   Component Date Value Ref Range Status     Glucose Casual 10/09/2019 87.0  51.0 - 200.0 mg/dL Final     Cholesterol 10/09/2019 190.4  0.0 - 200.0 mg/dL Final     Cholesterol/HDL Ratio 10/09/2019 5.0* 0.0 - 5.0 Final     HDL Cholesterol 10/09/2019 37.7* >40.0 mg/dL Final     Triglycerides 10/09/2019 126.4  0.0 - 150.0 mg/dL Final     VLDL Cholesterol 10/09/2019 25.3  7.0 - 32.0 mg/dL Final     LDL Cholesterol Calculated 10/09/2019 127  0 - 129 mg/dL Final     TSH 10/09/2019 7.46* 0.40 - 4.00 mU/L Final     T4 Free 10/09/2019 0.93  0.76 - 1.46 ng/dL Final     Lumbar xray:  1. Flat back, loss of the normal lordosis.  2. Moderate right colonic stool burden.  3. No acute osseous injury identified.    Assessment and Plan        Thomas was seen today for lab follow up.    Diagnoses and all orders for this visit:    Chronic bilateral low back pain without sciatica  Acquired flat back syndrome  Comments:  due to work as     Back xray negative for fractures or degenerative changes, however is significant for a flat back. Recommend changing jobs, as  is likely the cause of his flat back & back pain. Would " "recommend PT to strengthen hamstrings and glutes to take pressure off his back. Also recommend chiropractor and/or acupuncture. Patient states he cannot do any of these options with his current work schedule. He will try to do so in December when he has more free time.    In the mean time, I advised the patient to do back stretches every morning and before bed. 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 Counseled on good posture while driving. Will prescribe lidocaine cream.      -     lidocaine (LMX4) 4 % external cream; Apply cream to your back as needed for pain.    Cyst on ear  Patient reports \"pimple\" on back of L ear for 15+ years that comes/goes, and is filled with pus. Likely cyst. Recommend dermatology referral for cyst excision. Use warm compress to help with pain.    -     DERMATOLOGY REFERRAL - INTERNAL    Other constipation  Patient refuses medication for constipation at this time. Counseled to stay well hydrated and to add fiber & fruits/veges to his diet. Also recommend patient increase his physical activity to help with constipation.    Subclinical hypothyroidism  TSH elevated at 7.46, free T4 normal 0.93. Patient reports minimal symptoms of constipation and dry skin. Will not treat at this time, as symptoms are minimal and are not significantly affecting his life. Recommend retesting if patient develops fatigue, cold intolerance, weight gain, or other symptoms of hypothyroidism.      Discussed normal cholesterol and glucose labs.       There are no discontinued medications.    Options for treatment and follow-up care were reviewed with the patient. Thomas Sandhu  engaged in the decision making process and verbalized understanding of the options discussed and agreed with the final plan.    Mariano Tejeda, DO    "

## 2019-10-17 NOTE — PROGRESS NOTES
Preceptor Attestation:   Patient seen, evaluated and discussed with the resident. I have verified the content of the note, which accurately reflects my assessment of the patient and the plan of care.   Supervising Physician:  Mai Taylor MD

## 2019-10-18 RX ORDER — LIDOCAINE HYDROCHLORIDE 20 MG/ML
JELLY TOPICAL PRN
Qty: 30 ML | Refills: 3 | Status: SHIPPED | OUTPATIENT
Start: 2019-10-18 | End: 2020-01-27

## 2019-10-18 RX ORDER — NAPROXEN 250 MG/1
TABLET ORAL
Qty: 100 TABLET | Refills: 3 | Status: SHIPPED | OUTPATIENT
Start: 2019-10-18 | End: 2020-01-27

## 2019-10-18 RX ORDER — IBUPROFEN 600 MG/1
600 TABLET, FILM COATED ORAL EVERY 6 HOURS PRN
Qty: 60 TABLET | Refills: 0 | Status: SHIPPED | OUTPATIENT
Start: 2019-10-18 | End: 2020-06-16

## 2019-10-18 NOTE — TELEPHONE ENCOUNTER
Please also interpret and advise on any abnormal results, as patient is seeking results as well. RN will call once these are obtained.  Sameera Han RN

## 2019-10-18 NOTE — TELEPHONE ENCOUNTER
Order placed for ibuprofen for chronic back pain. Please call patient to let them know.    Jose Nava MD

## 2019-10-24 NOTE — TELEPHONE ENCOUNTER
"Per provider, \"Sorry for the delay, all of his labs and imaging were normal. \"    RN called patient via language line and relayed normal lab and imaging results.  Sameera Han RN   "

## 2020-01-27 ENCOUNTER — OFFICE VISIT (OUTPATIENT)
Dept: FAMILY MEDICINE | Facility: CLINIC | Age: 35
End: 2020-01-27
Payer: COMMERCIAL

## 2020-01-27 VITALS
SYSTOLIC BLOOD PRESSURE: 131 MMHG | TEMPERATURE: 98 F | WEIGHT: 216.4 LBS | OXYGEN SATURATION: 98 % | HEIGHT: 73 IN | DIASTOLIC BLOOD PRESSURE: 84 MMHG | RESPIRATION RATE: 16 BRPM | BODY MASS INDEX: 28.68 KG/M2 | HEART RATE: 67 BPM

## 2020-01-27 DIAGNOSIS — M54.50 CHRONIC BILATERAL LOW BACK PAIN WITHOUT SCIATICA: Primary | ICD-10-CM

## 2020-01-27 DIAGNOSIS — M40.30 ACQUIRED FLAT BACK SYNDROME: ICD-10-CM

## 2020-01-27 DIAGNOSIS — G89.29 CHRONIC BILATERAL LOW BACK PAIN WITHOUT SCIATICA: Primary | ICD-10-CM

## 2020-01-27 ASSESSMENT — ENCOUNTER SYMPTOMS
WEAKNESS: 0
CONSTIPATION: 0
CHILLS: 0
FEVER: 0
DIARRHEA: 0
BACK PAIN: 1
NUMBNESS: 0
DYSURIA: 0

## 2020-01-27 ASSESSMENT — MIFFLIN-ST. JEOR: SCORE: 1975.46

## 2020-01-27 NOTE — PROGRESS NOTES
MARIAMA Sandhu is a 34 year old  who presents for   Chief Complaint   Patient presents with     Back Pain     middle of back on right side, ongoing for years     Patient is a 34-year-old male with a past medical history of chronic low back pain and flat back syndrome.  He saw my colleague, Dr. Tejeda, on 10/17/2019 complaining of low back pain.  He does work as a .  At that time, he had had x-rays which were negative for fractures or degenerative changes.  He was counseled to change jobs, participate in PT, use a chiropractor and/or acupuncture.  He was also counseled on back stretches and support with good posture while driving.  He was given a prescription for lidocaine cream at that time.    Since then, patient reports he has tried chiropractics, which has not seem to help much.  He continues to take ibuprofen as needed, less than once a week, which does seem to help his pain when it is really bad.  He has not had a chance to try any physical therapy.  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    Back Pain      Duration: Greater than 3 years        Specific cause: none    Description:   Location of pain: low back left and middle of back right  Character of pain: sharp, pulling/tight and intermittent  Pain radiation:none  New numbness or weakness in legs, not attributed to pain:  No   Any new bowel or bladder incontinence?No     History:   Pain interferes with job/home/school: No   History of back problems: ongoing same problem, slowly worsening over 3-5 years  Any previous MRI or X-rays: Yes--at [facility].  Date 10/9/2019  Sees a specialist for back pain:  No  Therapies tried without relief: chiropractor    Alleviating factors:   Improved by: NSAIDs      Precipitating factors:  Worsened by: stretching    Accompanying Signs & Symptoms:  Risk of Fracture: No   Risk of Cauda Equina:No   Risk of Infection:  No   Risk of Cancer:  No     "  +++++++      Problem, Medication and Allergy Lists were   reviewed and updated if needed.     Patient Active Problem List    Diagnosis Date Noted     Chronic bilateral low back pain without sciatica 10/17/2019     Priority: Medium     Acquired flat back syndrome 10/17/2019     Priority: Medium     due to work as        Cyst on ear 10/17/2019     Priority: Medium     Other constipation 10/17/2019     Priority: Medium     Subclinical hypothyroidism 10/17/2019     Priority: Medium         Current Outpatient Medications   Medication Sig Dispense Refill     ibuprofen (ADVIL/MOTRIN) 600 MG tablet Take 1 tablet (600 mg) by mouth every 6 hours as needed for moderate pain 60 tablet 0       No Known Allergies.    Patient is an established patient of this clinic..         Review of Systems:   Review of Systems   Constitutional: Negative for chills and fever.   Gastrointestinal: Negative for constipation and diarrhea.   Genitourinary: Negative for dysuria.   Musculoskeletal: Positive for back pain.   Skin: Negative for rash.   Neurological: Negative for weakness and numbness.            Physical Exam:     Vitals:    01/27/20 1310   BP: 131/84   BP Location: Left arm   Patient Position: Sitting   Cuff Size: Adult Regular   Pulse: 67   Resp: 16   Temp: 98  F (36.7  C)   TempSrc: Oral   SpO2: 98%   Weight: 98.2 kg (216 lb 6.4 oz)   Height: 1.854 m (6' 1\")     Body mass index is 28.55 kg/m .  Vitals were reviewed and were normal     Physical Exam  Constitutional:       General: He is not in acute distress.     Appearance: Normal appearance.   HENT:      Head: Normocephalic and atraumatic.   Eyes:      General: No scleral icterus.     Extraocular Movements: Extraocular movements intact.      Conjunctiva/sclera: Conjunctivae normal.   Neck:      Musculoskeletal: Normal range of motion. No neck rigidity.   Cardiovascular:      Rate and Rhythm: Normal rate.   Pulmonary:      Effort: Pulmonary effort is normal. "   Musculoskeletal:      Thoracic back: He exhibits no bony tenderness and no swelling.      Lumbar back: Normal. He exhibits no bony tenderness and no swelling.        Back:    Skin:     General: Skin is warm and dry.   Neurological:      General: No focal deficit present.      Mental Status: He is alert and oriented to person, place, and time.      Motor: No weakness.      Coordination: Coordination normal.      Gait: Gait normal.           Results:   No testing ordered today    Assessment and Plan        Thomas was seen today for back pain.    Diagnoses and all orders for this visit:    Chronic bilateral low back pain without sciatica  Acquired flat back syndrome  Patient has had ongoing low back pain, primarily on the right (which is more mid back) for the past 3 to 5 years, progressively worsening.  He was seen here multiple times over the last year, and had x-rays of the thoracic and lumbar spine, which showed no signs of fracture or joint space narrowing.  It did show flat back.  He is a  for work.  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  He has previously had relief with ibuprofen, which he uses less than once weekly for worsened pain.  He is also tried chiropractics, which did not seem to improve his pain.  On exam, patient has elevation of the right mid back paraspinous musculature compared to the left.  This is also the area of tenderness.  He has no radicular symptoms, and no  spinous tenderness or step-off.  After significant discussion, patient agreed to attempt physical therapy; he is given a referral to [facility].  He is also given the option for OMT through a provider here.  He is instructed to make an appointment with 1 of our DO doctors for evaluation and possible OMT.  For pain control, I suggested he continue to use ibuprofen as needed, with the addition of Tylenol.  He can also get over-the-counter lidocaine patches, as he seems to have had relief with this in the  distant past.  -     [facility], PT, HAND AND CHIROPRACTIC REFERRAL - [facility]; Future       Medications Discontinued During This Encounter   Medication Reason     lidocaine (LMX4) 4 % external cream      naproxen (NAPROSYN) 250 MG tablet      lidocaine (XYLOCAINE) 2 % external gel        Options for treatment and follow-up care were reviewed with the patient. Thomas Sandhu  engaged in the decision making process and verbalized understanding of the options discussed and agreed with the final plan.    Sara Iglesias MD

## 2020-01-27 NOTE — PROGRESS NOTES
Preceptor Attestation:   Patient seen, evaluated and discussed with the resident. I have verified the content of the note, which accurately reflects my assessment of the patient and the plan of care.   Supervising Physician:  Farooq Awad MD

## 2020-01-27 NOTE — PATIENT INSTRUCTIONS
Here is the plan from today's visit    1. Chronic bilateral low back pain without sciatica  2. Acquired flat back syndrome  Try to get in into physical therapy and make sure you do those exercises at home! You can use tylenol (acetaminophen) and ibuprofen when you are having pain. You can also get Lidocaine patches or cream at the pharmacy. It will probably be cheaper if you to try getting them at the pharmacy instead of with a prescription.     For scheduling for OMT, ask to schedule with Dr. Tejeda, Dr. Ramires, or Dr. Mejía for evaluation for possible OMT.    - [facility], PT, HAND AND CHIROPRACTIC REFERRAL - [facility]; Future      Please call or return to clinic if your symptoms don't go away.    Follow up plan  Please make a clinic appointment for follow up with me (BOUCHRA BAEZ) in 1 month if pain is not getting better at all.    Thank you for coming to [facility] today.  Lab Testing:  **If you had lab testing today and your results are reassuring or normal they will be mailed to you or sent through [employer] within 7 days.   **If the lab tests need quick action we will call you with the results.  The phone number we will call with results is # [phone] (home) . If this is not the best number please call our clinic and change the number.  Medication Refills:  If you need any refills please call your pharmacy and they will contact us.   If you need to  your refill at a new pharmacy, please contact the new pharmacy directly. The new pharmacy will help you get your medications transferred faster.   Scheduling:  If you have any concerns about today's visit or wish to schedule another appointment please call our office during normal business hours [phone] (8-5:00 M-F)  If a referral was made to a [facility] and you don't get a call from central scheduling please call [phone].  If a Mammogram was ordered for you at [facility] call [phone] to schedule or change  your appointment.  If you had an XRay/CT/Ultrasound/MRI ordered the number is [phone] to schedule or change your radiology appointment.   Medical Concerns:  If you have urgent medical concerns please call [phone] at any time of the day.    Sara Iglesias MD

## 2020-01-28 ENCOUNTER — THERAPY VISIT (OUTPATIENT)
Dept: PHYSICAL THERAPY | Facility: CLINIC | Age: 35
End: 2020-01-28
Attending: FAMILY MEDICINE
Payer: COMMERCIAL

## 2020-01-28 DIAGNOSIS — M54.50 BILATERAL LOW BACK PAIN WITHOUT SCIATICA: ICD-10-CM

## 2020-01-28 DIAGNOSIS — M40.30 ACQUIRED FLAT BACK SYNDROME: ICD-10-CM

## 2020-01-28 DIAGNOSIS — M54.50 CHRONIC BILATERAL LOW BACK PAIN WITHOUT SCIATICA: ICD-10-CM

## 2020-01-28 DIAGNOSIS — G89.29 CHRONIC BILATERAL LOW BACK PAIN WITHOUT SCIATICA: ICD-10-CM

## 2020-01-28 PROCEDURE — 97110 THERAPEUTIC EXERCISES: CPT | Mod: GP | Performed by: PHYSICAL THERAPIST

## 2020-01-28 PROCEDURE — 97161 PT EVAL LOW COMPLEX 20 MIN: CPT | Mod: GP | Performed by: PHYSICAL THERAPIST

## 2020-01-28 PROCEDURE — 97140 MANUAL THERAPY 1/> REGIONS: CPT | Mod: GP | Performed by: PHYSICAL THERAPIST

## 2020-01-28 NOTE — PROGRESS NOTES
[facility] Initial Evaluation    Subjective:  Pt presents to PT with a chief complaint of chronic-recurrent LBP with gradual worsening over the past couple months with potential correlation to prolonged driving. Primary pain location identified at R mid lumbar spine and secondary pain at bilateral lower lumbar spine with radiation along iliac malu. Pain worse with heavy lifting, prolonged sitting, and driving.       Type of problem:  Lumbar   Condition occurred with:  Insidious onset. This is a chronic condition   Problem details: >5 years; worse (10/2019).   Patient reports pain:  Lumbar spine right and mid lumbar spine (secondary pain at lower lumbar spine). Radiates to: bilateral iliac crest  Associated symptoms:  Loss of motion/stiffness and loss of strength. Symptoms are exacerbated by bending and lifting and relieved by NSAID's.    Symptom: R lumbar spine    Date of Onset: worse 10/2019. Where condition occurred: at work.HPI: Documentation: chronic, driving.  and reported as 5/10 on pain scale. General health as reported by patient is good. Pertinent medical history includes:  None.  Medical allergies: none.  Surgeries include:  None.  Current medications:  None.   Primary job tasks include:  Driving.  Pain is described as aching and is constant (constant R mid lumbar pain; intermittent B LBP). Pain is worse in the P.M.. Since onset symptoms are gradually worsening. Special tests:  X-ray. Previous treatment includes chiropractic. There was mild improvement following previous treatment.   Occupation: . Restrictions include:  Working in normal job without restrictions.    Barriers include:  None as reported by patient.  Red flags:  None as reported by patient.                      Objective:  Standing Alignment:        Lumbar:  Lordosis decr                           Lumbar/SI Evaluation  ROM:    AROM Lumbar:   Flexion:            Min loss, pain +  Ext:                    WNL   Side  Bend:        Left:  Min loss, pain +    Right:  WNL  Rotation:           Left:  Mod loss, pain +    Right:  Min loss  Side Glide:        Left:     Right:           Lumbar Myotomes:  normal                Lumbar Dermtomes:  normal                Neural Tension/Mobility:  Lumbar:  Normal (LBP + w/ SLR bilaterally)        Lumbar Palpation:    Tenderness present at Left:    PSIS  Tenderness present at Right: Erector Spinae and PSIS      Spinal Segmental Conclusions:     Level: Hypo noted at L2 and L3                                                   General     ROS    Assessment/Plan:    Patient is a 34 year old male with lumbar complaints.    Patient has the following significant findings with corresponding treatment plan.                Diagnosis 1:  LBP  Pain -  manual therapy, splint/taping/bracing/orthotics, self management and education  Decreased ROM/flexibility - manual therapy and therapeutic exercise  Decreased joint mobility - manual therapy and therapeutic exercise  Decreased strength - therapeutic exercise and therapeutic activities  Impaired muscle performance - neuro re-education  Impaired posture - neuro re-education    Therapy Evaluation Codes:     Cumulative Therapy Evaluation is: Low complexity.    Previous and current functional limitations:  (See Goal Flow Sheet for this information)    Short term and Long term goals: (See Goal Flow Sheet for this information)     Communication ability:  Patient appears to be able to clearly communicate and understand verbal and written communication and follow directions correctly.  Treatment Explanation - The following has been discussed with the patient:   RX ordered/plan of care  Anticipated outcomes  Possible risks and side effects  This patient would benefit from PT intervention to resume normal activities.   Rehab potential is good.    Frequency:  1 X week, once daily  Duration:  for 8 weeks  Discharge Plan:  Achieve all LTG.  Independent in home treatment  program.  Reach maximal therapeutic benefit.    Please refer to the daily flowsheet for treatment today, total treatment time and time spent performing 1:1 timed codes.

## 2020-02-04 ENCOUNTER — THERAPY VISIT (OUTPATIENT)
Dept: PHYSICAL THERAPY | Facility: CLINIC | Age: 35
End: 2020-02-04
Payer: COMMERCIAL

## 2020-02-04 DIAGNOSIS — M54.50 BILATERAL LOW BACK PAIN WITHOUT SCIATICA: ICD-10-CM

## 2020-02-04 PROCEDURE — 97140 MANUAL THERAPY 1/> REGIONS: CPT | Mod: GP | Performed by: PHYSICAL THERAPIST

## 2020-02-04 PROCEDURE — 97110 THERAPEUTIC EXERCISES: CPT | Mod: GP | Performed by: PHYSICAL THERAPIST

## 2020-02-11 ENCOUNTER — THERAPY VISIT (OUTPATIENT)
Dept: PHYSICAL THERAPY | Facility: CLINIC | Age: 35
End: 2020-02-11
Payer: COMMERCIAL

## 2020-02-11 DIAGNOSIS — M54.50 BILATERAL LOW BACK PAIN WITHOUT SCIATICA: ICD-10-CM

## 2020-02-11 PROCEDURE — 97140 MANUAL THERAPY 1/> REGIONS: CPT | Mod: GP | Performed by: PHYSICAL THERAPIST

## 2020-02-11 PROCEDURE — 97110 THERAPEUTIC EXERCISES: CPT | Mod: GP | Performed by: PHYSICAL THERAPIST

## 2020-02-12 ENCOUNTER — THERAPY VISIT (OUTPATIENT)
Dept: PHYSICAL THERAPY | Facility: CLINIC | Age: 35
End: 2020-02-12
Payer: COMMERCIAL

## 2020-02-12 DIAGNOSIS — M54.50 BILATERAL LOW BACK PAIN WITHOUT SCIATICA: ICD-10-CM

## 2020-02-12 PROCEDURE — 97110 THERAPEUTIC EXERCISES: CPT | Mod: GP | Performed by: PHYSICAL THERAPIST

## 2020-02-12 PROCEDURE — 97012 MECHANICAL TRACTION THERAPY: CPT | Mod: GP | Performed by: PHYSICAL THERAPIST

## 2020-02-17 ENCOUNTER — OFFICE VISIT (OUTPATIENT)
Dept: FAMILY MEDICINE | Facility: CLINIC | Age: 35
End: 2020-02-17
Payer: COMMERCIAL

## 2020-02-17 VITALS
WEIGHT: 218.4 LBS | TEMPERATURE: 98.2 F | OXYGEN SATURATION: 100 % | RESPIRATION RATE: 16 BRPM | HEART RATE: 64 BPM | SYSTOLIC BLOOD PRESSURE: 112 MMHG | HEIGHT: 73 IN | DIASTOLIC BLOOD PRESSURE: 75 MMHG | BODY MASS INDEX: 28.94 KG/M2

## 2020-02-17 DIAGNOSIS — M54.50 CHRONIC BILATERAL LOW BACK PAIN WITHOUT SCIATICA: ICD-10-CM

## 2020-02-17 DIAGNOSIS — G89.29 CHRONIC BILATERAL LOW BACK PAIN WITHOUT SCIATICA: ICD-10-CM

## 2020-02-17 DIAGNOSIS — M40.30 ACQUIRED FLAT BACK SYNDROME: Primary | ICD-10-CM

## 2020-02-17 ASSESSMENT — ENCOUNTER SYMPTOMS
CHILLS: 0
UNEXPECTED WEIGHT CHANGE: 0
FEVER: 0
ABDOMINAL PAIN: 0
DYSURIA: 0
DIFFICULTY URINATING: 0
NECK PAIN: 0
NUMBNESS: 0
MYALGIAS: 0
JOINT SWELLING: 0
WEAKNESS: 0
DIZZINESS: 0
SHORTNESS OF BREATH: 0
BACK PAIN: 1
ARTHRALGIAS: 0
HEADACHES: 0

## 2020-02-17 ASSESSMENT — MIFFLIN-ST. JEOR: SCORE: 1984.54

## 2020-02-17 NOTE — NURSING NOTE
Due to patient being non-English speaking/uses sign language, an  was used for this visit. Only for face-to-face interpretation by an external agency, date and length of interpretation can be found on the scanned worksheet.     name: Gabriel Loza  Agency: [employer]  Language: Algerian   Telephone number: [phone]  Type of interpretation: Face-to-face, spoken     AMY Peters 4:24 PM February 17, 2020

## 2020-02-17 NOTE — PROGRESS NOTES
Preceptor Attestation:   Patient seen and discussed with the resident. Assessment and plan reviewed with resident and agreed upon.   Supervising Physician:  DO [facility]

## 2020-02-17 NOTE — PROGRESS NOTES
Thomas is a 34 year old  who presents for   Patient presents with:  RECHECK: middle of back pain-OMT      Assessment and Plan        Acquired flat back syndrome  Chronic bilateral low back pain without sciatica  Pain is likely secondary to acquired flat back, aggravated by his job as a . No red flag symptoms, weakness, or numbness/tingling. Patient has failed conservative treatment. Unwilling to try PT, pool therapy or acupuncutre again at this time. Would be a poor OMT candidate, as chiropractics/PT made his pain worse.  Will obtain MRI lumbar spine today. Return for follow up after MRI results are back to discuss results.    Depending on MRI results, will consider referral to sports medicine or pain clinic. Consider muscle relaxer at night for pain. Medications are limited due to patient's job as a .    - MR Lumbar Spine w/o Contrast; Future         Return in about 2 weeks (around 3/2/2020) for follow up on MRI results.    There are no discontinued medications.      Mariano Tejeda, DO LESLIE       Thomas is a 34 year old  who presents for   Patient presents with:  RECHECK: middle of back pain-OMT      Visit [place]  1. follow up low back pain    Patient is a 34-year-old male with a past medical history of chronic low back pain and flat back syndrome. Works as at . Recent xray showed flat back-no degenerative changes, no fractures. States he has tried PT, pool therapy, chiropractics, acupuncture, massage, tylenol/ibuprofen, lidocaine patches with no relief. PT/chiropractics made his pain worse. Pain is in low thoracic/lumbar region, worse with sitting. He has not worked in months due to the pain. No numbness/tingling in legs. No red flag signs (loss bowel/bladder, weakness, numbness in groin).  Requesting a MRI today.    A [employer]  was used for  this visit.     Problem, Medication and Allergy Lists were reviewed and updated if needed..  Patient is an established  "patient of this clinic..  Health Maintenance Due   Topic Date Due     PREVENTIVE CARE VISIT  1985     DTAP/TDAP/TD IMMUNIZATION (1 - Tdap) 05/05/1996     HIV SCREENING  05/05/2000     INFLUENZA VACCINE (1) 09/01/2019          Review of Systems:   Review of Systems   Constitutional: Negative for chills, fever and unexpected weight change.   Respiratory: Negative for shortness of breath.    Cardiovascular: Negative for chest pain.   Gastrointestinal: Negative for abdominal pain.   Genitourinary: Negative for difficulty urinating and dysuria.   Musculoskeletal: Positive for back pain. Negative for arthralgias, gait problem, joint swelling, myalgias and neck pain.   Skin: Negative for rash.   Neurological: Negative for dizziness, weakness, numbness and headaches.          Physical Exam:     Vitals:    02/17/20 1621   BP: 112/75   BP Location: Left arm   Patient Position: Sitting   Cuff Size: Adult Large   Pulse: 64   Resp: 16   Temp: 98.2  F (36.8  C)   TempSrc: Oral   SpO2: 100%   Weight: 99.1 kg (218 lb 6.4 oz)   Height: 1.854 m (6' 1\")     Body mass index is 28.81 kg/m .  Vitals were reviewed and were normal     Physical Exam  Constitutional:       General: He is not in acute distress.     Appearance: Normal appearance.   HENT:      Head: Normocephalic and atraumatic.   Eyes:      Conjunctiva/sclera: Conjunctivae normal.   Neck:      Musculoskeletal: Normal range of motion.   Pulmonary:      Effort: Pulmonary effort is normal.   Musculoskeletal:      Comments: Lumbar paraspinal tenderness. Mildly decreased ROM of back. Normal sensation and strength of LE.   Neurological:      General: No focal deficit present.      Mental Status: He is alert.      Sensory: No sensory deficit.      Motor: No weakness.      Coordination: Coordination normal.      Gait: Gait normal.   Psychiatric:         Mood and Affect: Mood normal.         Results:   Results are ordered and pending    Options for treatment and follow-up care " were reviewed with the patient. Thomas Sandhu  engaged in the decision making process and verbalized understanding of the options discussed and agreed with the final plan.    Mariano Tejeda, DO

## 2020-02-18 ENCOUNTER — HOSPITAL ENCOUNTER (OUTPATIENT)
Dept: MRI IMAGING | Facility: CLINIC | Age: 35
Discharge: HOME | End: 2020-02-18
Attending: FAMILY MEDICINE | Admitting: FAMILY MEDICINE
Payer: COMMERCIAL

## 2020-02-18 DIAGNOSIS — M54.50 CHRONIC BILATERAL LOW BACK PAIN WITHOUT SCIATICA: ICD-10-CM

## 2020-02-18 DIAGNOSIS — G89.29 CHRONIC BILATERAL LOW BACK PAIN WITHOUT SCIATICA: ICD-10-CM

## 2020-02-18 DIAGNOSIS — M40.30 ACQUIRED FLAT BACK SYNDROME: ICD-10-CM

## 2020-02-18 PROCEDURE — 72148 MRI LUMBAR SPINE W/O DYE: CPT

## 2020-02-21 ENCOUNTER — OFFICE VISIT (OUTPATIENT)
Dept: FAMILY MEDICINE | Facility: CLINIC | Age: 35
End: 2020-02-21
Payer: COMMERCIAL

## 2020-02-21 VITALS
HEART RATE: 60 BPM | DIASTOLIC BLOOD PRESSURE: 80 MMHG | OXYGEN SATURATION: 99 % | SYSTOLIC BLOOD PRESSURE: 119 MMHG | HEIGHT: 73 IN | BODY MASS INDEX: 28.89 KG/M2 | WEIGHT: 218 LBS

## 2020-02-21 DIAGNOSIS — M47.812 CERVICAL SPONDYLOSIS WITHOUT MYELOPATHY: Primary | ICD-10-CM

## 2020-02-21 DIAGNOSIS — M40.30 ACQUIRED FLAT BACK SYNDROME: ICD-10-CM

## 2020-02-21 DIAGNOSIS — G89.29 CHRONIC BILATERAL LOW BACK PAIN WITHOUT SCIATICA: ICD-10-CM

## 2020-02-21 DIAGNOSIS — M54.50 CHRONIC BILATERAL LOW BACK PAIN WITHOUT SCIATICA: ICD-10-CM

## 2020-02-21 ASSESSMENT — MIFFLIN-ST. JEOR: SCORE: 1982.72

## 2020-02-21 NOTE — PATIENT INSTRUCTIONS
Here is the plan from today's visit    Thank you for coming to [facility] today.  Lab Testing:  **If you had lab testing today and your results are reassuring or normal they will be mailed to you or sent through [facility] within 7 days.   **If the lab tests need quick action we will call you with the results.  The phone number we will call with results is # [phone] (home) . If this is not the best number please call our clinic and change the number.  Medication Refills:  If you need any refills please call your pharmacy and they will contact us.   If you need to  your refill at a new pharmacy, please contact the new pharmacy directly. The new pharmacy will help you get your medications transferred faster.   Scheduling:  If you have any concerns about today's visit or wish to schedule another appointment please call our office during normal business hours [phone] (8-5:00 M-F)  If a referral was made to a [facility] and you don't get a call from central scheduling please call [phone].  If a Mammogram was ordered for you at [facility] call [phone] to schedule or change your appointment.  If you had an XRay/CT/Ultrasound/MRI ordered the number is [phone] to schedule or change your radiology appointment.   Medical Concerns:  If you have urgent medical concerns please call [phone] at any time of the day.    Doris Loera MD

## 2020-02-21 NOTE — NURSING NOTE
Due to patient being non-English speaking/uses sign language, an  was used for this visit. Only for face-to-face interpretation by an external agency, date and length of interpretation can be found on the scanned worksheet.     name: Gabriel Loza  Agency: [employer]  Language: Yemeni   Telephone number: [phone]  Type of interpretation: Face-to-face, spoken    Soha Cardona CMA

## 2020-02-21 NOTE — PROGRESS NOTES
"       [facility]       Thomas Sandhu is a 34 year old  who presents for   Chief Complaint   Patient presents with     Results     MRI Results     5 years of low back pain, worse with prolonged sitting. Is a . Recent xray showed flat back-no degenerative changes, no fractures.  Here today to review MRI results.    No red flag signs (loss bowel/bladder, weakness, numbness in groin).  No radicular symptoms.    A [employer]  was used for  this visit.    +++++++    Problem, Medication and Allergy Lists were reviewed and updated if needed..    Patient is an established patient of this clinic..         Review of Systems:   Review of Systems  4 system ROS negative other than as noted in HPI         Physical Exam:     Vitals:    02/21/20 1014   BP: 119/80   BP Location: Left arm   Patient Position: Sitting   Cuff Size: Adult Large   Pulse: 60   SpO2: 99%   Weight: 98.9 kg (218 lb)   Height: 1.854 m (6' 1\")     Body mass index is 28.76 kg/m .  Vitals were reviewed and were normal     Physical Exam  Vitals signs and nursing note reviewed.   Constitutional:       General: He is not in acute distress.     Appearance: He is well-developed.   HENT:      Head: Normocephalic and atraumatic.   Neck:      Musculoskeletal: Normal range of motion.   Pulmonary:      Effort: Pulmonary effort is normal. No respiratory distress.   Musculoskeletal: Normal range of motion.      Comments: nontender to palpation. Points to L4/L5/S1 region when describing pain.   Skin:     General: Skin is warm and dry.   Neurological:      Mental Status: He is alert and oriented to person, place, and time.           Results:   MR LUMBAR SPINE W/O CONTRAST 2/18/2020 4:49 PM     Provided History: chronic low back pain, failed conservative  treatment; Acquired flat back syndrome; Chronic bilateral low back  pain without sciatica; Chronic bilateral low back pain without  sciatica     ICD-10: Acquired flat back syndrome; Chronic bilateral low back " pain  without sciatica; Chronic bilateral low back pain without sciatica     Comparison: None available.     Technique: Sagittal T1-weighted, sagittal STIR, 3D volumetric axial  and sagittal reconstructed T2-weighted images of the lumbar spine were  obtained without intravenous contrast.      Findings: Counting down from C2, there are 5 lumbar-type vertebrae.   The tip of the conus medullaris is at L1.  Normal lumbar vertebral  alignment. There is straightening of the expected lumbar lordosis.  There is multilevel disc height narrowing and loss of intradiscal T2  hyperintense signal, most pronounced at L5-S1 with mild disc height  loss..  Normal marrow signal.     On a level by level basis:     T12-L1: No spinal canal or neural foraminal stenosis.     L1-2: No spinal canal or neural foraminal stenosis.     L2-3: Circumferential disc bulge with a central disc protrusion and  annular fissure. Mild facet arthropathy. Mild spinal canal stenosis.  Neural foramen are patent bilaterally.     L3-4: Circumferential disc bulge with a central disc protrusion and  annular fissure. Facet arthropathy bilaterally. Mild left neural  foraminal stenosis. Right neural foramen is patent. Mild spinal canal  stenosis.     L4-5: Circumferential disc bulge and superimposed central disc  protrusion and annular fissure narrows the lateral recesses and abuts  the traversing L5 nerve roots bilaterally.  Facet arthropathy  bilaterally. Mild to moderate neural foraminal stenosis bilaterally.  Mild spinal canal stenosis.     L5-S1: Circumferential disc bulge with a superimposed central disc  fusion and annular fissure, which abuts the traversing S1 nerve roots  bilaterally. Facet arthropathy bilaterally. Mild neural foraminal  stenosis bilaterally. Spinal canal is patent.     Paraspinous tissues are within normal limits.                                                                      Impression: Multilevel lumbar spondylosis, most pronounced  at L4-5  with mild to moderate neural foraminal stenosis bilaterally, mild  spinal canal stenosis and narrowing of the lateral recesses with  abutment of the traversing L5 nerve roots bilaterally. Further  degenerative disease as described above.    Assessment and Plan        1. Cervical spondylosis  Acquired flat back syndrome  Chronic bilateral low back pain without sciatica  34-year-old male with a history of low back pain here today to review MRI results.  MRI shows multilevel lumbar spondylosis, most pronounced at L4-L5 region with mild to moderate foraminal stenosis and mild spinal canal stenosis with abutment of the L5 nerve roots bilaterally.  Patient has no red flag symptoms at this time, but does have severe low back pain that inhibits his ability to work as a .  Today he is open to trialing physical therapy and meeting with sports medicine for consideration of steroid injections.  - [facility] PT, HAND AND CHIROPRACTIC REFERRAL - [facility]; Future  - SPORTS MEDICINE REFERRAL - [facility]       There are no discontinued medications.    Options for treatment and follow-up care were reviewed with the patient. Thomas Sandhu  engaged in the decision making process and verbalized understanding of the options discussed and agreed with the final plan.    Doris Loera MD

## 2020-02-23 NOTE — PROGRESS NOTES
Preceptor Attestation:   Patient seen, evaluated and discussed with the resident. I have verified the content of the note, which accurately reflects my assessment of the patient and the plan of care.   Supervising Physician:  Chapito Posadas MD

## 2020-02-26 ENCOUNTER — TELEPHONE (OUTPATIENT)
Dept: FAMILY MEDICINE | Facility: CLINIC | Age: 35
End: 2020-02-26

## 2020-02-26 NOTE — TELEPHONE ENCOUNTER
Called patient using [employer] (ID [id number]) to schedule sports med appointment. Patient advised that [place] and will call back when he returns to schedule appointment.    Reason: lumbar spondylosis   Urgency of Appointment: Next Available  Length of Problem: Chronic (3 months or longer since onset): Ordering Provider - please ensure that radiographs or imaging is available within the past 12 months or obtain new imaging if concern for bone or joint.  What are you requesting be done? Management Recommendations and consideration of Injection

## 2020-06-16 ENCOUNTER — VIRTUAL VISIT (OUTPATIENT)
Dept: FAMILY MEDICINE | Facility: CLINIC | Age: 35
End: 2020-06-16
Payer: COMMERCIAL

## 2020-06-16 DIAGNOSIS — R21 FACIAL RASH: Primary | ICD-10-CM

## 2020-06-16 RX ORDER — TRIAMCINOLONE ACETONIDE 1 MG/G
1 OINTMENT TOPICAL
COMMUNITY
Start: 2018-01-19 | End: 2021-05-26

## 2020-06-16 NOTE — PROGRESS NOTES
"Family Medicine Telephone Visit Note         Telephone Visit Consent   Patient was verbally read the following and verbal consent was obtained.    \"Telephone visits are billed at different rates depending on your insurance coverage. During this emergency period, for some insurers they may be billed the same as an in-person visit.  Please reach out to your insurance provider with any questions.  If during the course of the call the physician/provider feels a telephone visit is not appropriate, you will not be charged for this service.\"    Name person giving consent:  Patient   Date verbal consent given:  6/16/2020  Time verbal consent given:  3:03 PM    Chief Complaint   Patient presents with     Derm Problem     per pt rash is all over body, itching and dry skin all over body-had medication in past that worked but he doesn't have it anymore       Due to patient being non-English speaking/uses sign language, an  was used for this visit. Only for face-to-face interpretation by an external agency, date and length of interpretation can be found on the scanned worksheet.     name: Gabriel Loza  Agency: [employer]  Language: Colombian   Telephone number: [phone]  Type of interpretation: Telephone, spoken         HPI   Patients name: [facility]  Appointment start time:  3:13 PM    Itchy and dry skin most concerning on his face. Has to daily use a cream prescription for past 2 years or so. If he doesn't use the cream for couple of days his skin peels and is very itchy. Skin is lighter too. He isn't at home and unable to do photo or video. He says the cream is a prescription he has been getting from [facility] or others for 3 years.  On historical he also has lidex cream he got in 2019 and ketoconazle cream. He doesn't think it is ketoconazole.     No bumps just irritated dry skin that peels.     Only have triamcinolone ointment in our system.     [place] and earliest he could be back for " "appointment Is 2 weeks.         Current Outpatient Medications   Medication Sig Dispense Refill     ibuprofen (ADVIL/MOTRIN) 600 MG tablet Take 1 tablet (600 mg) by mouth every 6 hours as needed for moderate pain (Patient not taking: Reported on 2/21/2020) 60 tablet 0     Lidocaine-Menthol 4-5 % PTCH        No Known Allergies           Review of Systems:     Constitutional, HEENT, cardiovascular, pulmonary, gi and gu systems are negative, except as otherwise noted.         Physical Exam:     There were no vitals taken for this visit.  Estimated body mass index is 28.76 kg/m  as calculated from the following:    Height as of 2/21/20: 1.854 m (6' 1\").    Weight as of 2/21/20: 98.9 kg (218 lb).    Exam:  Constitutional: healthy, alert and no distress  Psychiatric: mentation appears normal and affect normal/bright    Unable to see skin by phone or video        Assessment and Plan       ICD-10-CM    1. Facial rash  R21      Patient contiously only would ask for refills of a cream. Tried multiple times with  to tell him I couldn't assess his rash on the phone, know what cream he is using. Nor would I prescribe anything. His irritated and lightening face is very concerning for long term steroid topical use damage. Advised he see a doctor if he could. He was not concerned and didn't think he could until he is back in [place]. Says he will call and come in person to [facility]. Could be 2 weeks earliest but maybe longer. There for work.     Agreed to not use prescription cream and only use vaseline for dryness until he is seen by a doctor in person. If face begins bleeding or peeling a lot to go to ER.   Refilled medications that would be required in the next 3 months.     After Visit Information:  Patient declined AVS     No follow-ups on file.    Appointment end time: 3:42 PM  This is a telephone visit that took 25 minutes.      Clinician location:  Massiel Wheeler MD  I precepted today with " [facility].

## 2020-06-16 NOTE — PROGRESS NOTES
Telephone Preceptor Attestation:   Patient spoken with and discussed with the resident. I have verified the content of the note, which accurately reflects my assessment of the patient and the plan of care.   Supervising Physician:  Olya Brothers MD

## 2021-04-28 NOTE — PROGRESS NOTES
"Male Physical Note          HPI         Concerns today:   Patient presents with:  Physical: Dysuria   Physical - wants \"testing for infection\"  Symptoms: burning with ejaculation.   No penile discharge.  Wife was diagnosed with yeast infection. She is currently pregnant  Skin of penis at that time with a rash, and itching.  Sometimes white discharge.    Patient Active Problem List   Diagnosis     Chronic bilateral low back pain without sciatica     Acquired flat back syndrome     Cyst on ear     Other constipation     Subclinical hypothyroidism       History reviewed. No pertinent past medical history.       History reviewed. No pertinent family history.           Review of Systems:     Review of Systems:  CONSTITUTIONAL: NEGATIVE for fever, chills, change in weight  INTEGUMENTARY/SKIN: NEGATIVE for worrisome rashes, moles or lesions  EYES: NEGATIVE for vision changes or irritation  ENT/MOUTH: NEGATIVE for ear, mouth and throat problems  RESP: NEGATIVE for significant cough or SOB  BREAST: NEGATIVE for masses, tenderness or discharge  CV: NEGATIVE for chest pain, palpitations or peripheral edema  GI: NEGATIVE for nausea, abdominal pain, heartburn, or change in bowel habits  : per above  MUSCULOSKELETAL: NEGATIVE for significant arthralgias or myalgia  NEURO: NEGATIVE for weakness, dizziness or paresthesias  ENDOCRINE: NEGATIVE for temperature intolerance, skin/hair changes  HEME/ALLERGY: NEGATIVE for bleeding problems  PSYCHIATRIC: NEGATIVE for changes in mood or affect  Sleep:   Do you snore most or the night (as reported by a family member)? No  Do you feel sleepy or extremely tired during most of the day? No             Social History     Social History     Socioeconomic History     Marital status: Single     Spouse name: Not on file     Number of children: Not on file     Years of education: Not on file     Highest education level: Not on file   Occupational History     Occupation:    Social Needs     " Financial resource strain: Not on file     Food insecurity     Worry: Not on file     Inability: Not on file     Transportation needs     Medical: Not on file     Non-medical: Not on file   Tobacco Use     Smoking status: Never Smoker     Smokeless tobacco: Never Used   Substance and Sexual Activity     Alcohol use: No     Drug use: No     Sexual activity: Not on file   Lifestyle     Physical activity     Days per week: Not on file     Minutes per session: Not on file     Stress: Not on file   Relationships     Social connections     Talks on phone: Not on file     Gets together: Not on file     Attends [employer] service: Not on file     Active member of club or organization: Not on file     Attends meetings of clubs or organizations: Not on file     Relationship status: Not on file     Intimate partner violence     Fear of current or ex partner: Not on file     Emotionally abused: Not on file     Physically abused: Not on file     Forced sexual activity: Not on file   Other Topics Concern     Parent/sibling w/ CABG, MI or angioplasty before 65F 55M? Not Asked   Social History Narrative    12 bothers and sisters.         Moved to [place] 2019.        Lives with wife.        Marital Status:  Who lives in your household? Self, wife, kids.    Has anyone hurt you physically, for example by pushing, hitting, slapping or kicking you or forcing you to have sex? Denies  Do you feel threatened or controlled by a partner, ex-partner or anyone in your life? Denies    Sexual Health     Sexual concerns: Yes   STI History: Neg      Recommended Screening     STI screening           Physical Exam:     Vitals: /73 (BP Location: Left arm, Patient Position: Sitting, Cuff Size: Adult Large)   Pulse 71   Temp 98.2  F (36.8  C) (Oral)   Wt 95.3 kg (210 lb 3.2 oz)   SpO2 99%   BMI 27.73 kg/m    BMI= Body mass index is 27.73 kg/m .  Physical Exam  Constitutional:       General: He is not in acute distress.      Appearance: He is well-developed. He is not diaphoretic.   HENT:      Head: Normocephalic and atraumatic.   Eyes:      Conjunctiva/sclera: Conjunctivae normal.   Neck:      Musculoskeletal: Normal range of motion.   Cardiovascular:      Rate and Rhythm: Normal rate and regular rhythm.      Heart sounds: No murmur.   Pulmonary:      Effort: Pulmonary effort is normal. No respiratory distress.      Breath sounds: No wheezing or rales.   Abdominal:      Palpations: Abdomen is soft. There is no mass.      Tenderness: There is no abdominal tenderness. There is no guarding.   Genitourinary:     Comments: Declined  exam  Musculoskeletal: Normal range of motion.      Right lower leg: No edema.      Left lower leg: No edema.   Neurological:      General: No focal deficit present.      Mental Status: He is alert.           Assessment and Plan      Thomas was seen today for physical.    Diagnoses and all orders for this visit:    Encounter for preventive health examination  Routine screening for STI (sexually transmitted infection)  Primarily  concern today. Patient declining genital exam, but we did review internet pictures together and based on this and hx, he has penile findings consistent with candidal balanitis. Will treat per below. Will also complete STI screen today.  -     Neisseria gonorrhoeae PCR  -     Chlamydia trachomatis PCR  -     Treponema Abs w Reflex to RPR and Titer  -     HIV Antigen Antibody Combo  -     Hepatitis B Surface Antibody  -     Hepatitis B surface antigen  -     Hepatitis C antibody    Subclinical hypothyroidism  -     TSH with free T4 reflex    Balanitis  -     miconazole (MICATIN) 2 % external cream; Apply topically 2 times daily for 14 days          Options for treatment and follow-up care were reviewed with the patient. Thomas Sandhu and/or guardian engaged in the decision making process and verbalized understanding of the options discussed and agreed with the final plan.    Adali BARNES  MD [place]

## 2021-04-29 ENCOUNTER — OFFICE VISIT (OUTPATIENT)
Dept: FAMILY MEDICINE | Facility: CLINIC | Age: 36
End: 2021-04-29
Payer: COMMERCIAL

## 2021-04-29 VITALS
TEMPERATURE: 98.2 F | DIASTOLIC BLOOD PRESSURE: 73 MMHG | WEIGHT: 210.2 LBS | BODY MASS INDEX: 27.73 KG/M2 | SYSTOLIC BLOOD PRESSURE: 108 MMHG | HEART RATE: 71 BPM | OXYGEN SATURATION: 99 %

## 2021-04-29 DIAGNOSIS — Z11.3 ROUTINE SCREENING FOR STI (SEXUALLY TRANSMITTED INFECTION): ICD-10-CM

## 2021-04-29 DIAGNOSIS — Z00.00 ENCOUNTER FOR PREVENTIVE HEALTH EXAMINATION: Primary | ICD-10-CM

## 2021-04-29 DIAGNOSIS — E03.8 SUBCLINICAL HYPOTHYROIDISM: ICD-10-CM

## 2021-04-29 DIAGNOSIS — N48.1 BALANITIS: ICD-10-CM

## 2021-04-29 LAB — TSH SERPL DL<=0.005 MIU/L-ACNC: 3.95 MU/L (ref 0.4–4)

## 2021-04-29 PROCEDURE — 86780 TREPONEMA PALLIDUM: CPT | Performed by: FAMILY MEDICINE

## 2021-04-29 PROCEDURE — 87340 HEPATITIS B SURFACE AG IA: CPT | Performed by: FAMILY MEDICINE

## 2021-04-29 PROCEDURE — 36415 COLL VENOUS BLD VENIPUNCTURE: CPT | Performed by: FAMILY MEDICINE

## 2021-04-29 PROCEDURE — 86706 HEP B SURFACE ANTIBODY: CPT | Performed by: FAMILY MEDICINE

## 2021-04-29 PROCEDURE — 87389 HIV-1 AG W/HIV-1&-2 AB AG IA: CPT | Performed by: FAMILY MEDICINE

## 2021-04-29 PROCEDURE — 86803 HEPATITIS C AB TEST: CPT | Performed by: FAMILY MEDICINE

## 2021-04-29 PROCEDURE — 99395 PREV VISIT EST AGE 18-39: CPT | Mod: GC | Performed by: STUDENT IN AN ORGANIZED HEALTH CARE EDUCATION/TRAINING PROGRAM

## 2021-04-29 PROCEDURE — 84443 ASSAY THYROID STIM HORMONE: CPT | Performed by: FAMILY MEDICINE

## 2021-04-29 RX ORDER — MICONAZOLE NITRATE 20 MG/G
CREAM TOPICAL 2 TIMES DAILY
Qty: 30 G | Refills: 0 | Status: SHIPPED | OUTPATIENT
Start: 2021-04-29 | End: 2021-05-13

## 2021-04-30 LAB
HBV SURFACE AB SERPL IA-ACNC: 310.73 M[IU]/ML
HBV SURFACE AG SERPL QL IA: NONREACTIVE
HCV AB SERPL QL IA: NONREACTIVE
HIV 1+2 AB+HIV1 P24 AG SERPL QL IA: NONREACTIVE
T PALLIDUM AB SER QL: NONREACTIVE

## 2021-05-04 NOTE — RESULT ENCOUNTER NOTE
Please call patient with the following message:  Labs from last visit (STI, thyroid) - were all normal. I saw that you were unable to provide a sample for the urine tests. You can return to repeat this test, or if you are still having symptoms like discharge, we can do a swab of that too.  Thanks Adali Cody MD

## 2021-05-23 NOTE — PROGRESS NOTES
Assessment & Plan     Encounter for laboratory testing for COVID-19 virus  Cough  Shortness of breath  Wheezing  Patient is a pleasant 36-year-old male without contributing past medical history who presents today for ongoing cough and shortness of breath.  Patient what tested positive for Covid on February 4, quarantined.  After that, travel to [place] for mid February until just recently in April.  Upon returning, few days later, he developed return of shortness of breath and cough, had improved prior to travel to [place].  He has not had any fevers or chills.  Has otherwise felt well.  A Covid swab was completed again today, which reassuringly returned negative.  He is given a referral to the adult post Covid clinic, as I suspect that his symptoms are most likely related to ongoing Covid symptoms despite having a brief time without symptoms.  Other etiology include allergies, see below.  - Symptomatic COVID-19 Virus (Coronavirus) by PCR  - ADULT POST COVID CLINIC REFERRAL    Itchy eyes  In addition to the ongoing cough, patient has had some wheezing.  He also has continued loss of taste and smell.  Over the last few days in particular, he has had itchy eyes without significant rhinorrhea or sore throat.  On exam today, generally appears well.  However, I do suspect that there is likely some component of seasonal allergies.  He is given a prescription for cetirizine today to try and see if this will improve his overall symptoms.    If symptoms continue to be present, without worsening, highly recommend that patient get set up with the adult post Covid clinic as I suspect his symptoms primarily are related to having had Covid previously.  He certainly can return if he has any new symptoms.  - cetirizine (ZYRTEC) 10 MG tablet  Dispense: 90 tablet; Refill: 3      No follow-ups on file.    Sara Iglesias MD  [facility] MARKO Guzman is a 36 year old who presents for the following  health issues   Chief Complaint   Patient presents with     Cough       HPI   Went to [place], a week after travel having cough and sob    covid test, was negative, still have symptoms.     Traveled feb 16 April 14 return.   Symptoms startedaround 21st of April, tested around may 4    Tested positive before left for [facility], sick for about 15 days, lost sense of taste and smell, still haven't gotten that back.     Cough, sob    No meds daily,   Symptomatic meds ->   Tried two meds, one tablet for couhg, one liquid.   Both specifically for cough symptoms.     No ohlegm, not coughin anything up.   Dry cough, sometimes shortness of breath.     Sob no trigger, nothing improves. Only when coughing.   Wheezing -> yes. Feels in chest.     abd pain, change in bm or urination - NO  Sore throat, rhinorrhea, itchy eyes -   Not usually but this morning also has burning in eyes and watery eyes.     Fevers. None    1.5-2 weeks return if symptoms not better.  Offered inhaler, not interested at this time.     covid test completed originally through [facility] positive covid 2/4    Language Line  used:  ID [id number]    Review of Systems   Constitutional, HEENT, cardiovascular, pulmonary, gi and gu systems are negative, except as otherwise noted.      Objective    /85   Pulse 72   Temp 99.4  F (37.4  C)   SpO2 98%   There is no height or weight on file to calculate BMI.  Physical Exam  Constitutional:       Appearance: Normal appearance.   HENT:      Head: Normocephalic.   Eyes:      General: No scleral icterus.     Extraocular Movements: Extraocular movements intact.      Conjunctiva/sclera: Conjunctivae normal.   Neck:      Musculoskeletal: Normal range of motion.   Cardiovascular:      Rate and Rhythm: Normal rate and regular rhythm.      Heart sounds: Normal heart sounds.   Pulmonary:      Effort: Pulmonary effort is normal.      Breath sounds: Wheezing present. No rhonchi or rales.   Musculoskeletal: Normal range of  motion.   Neurological:      General: No focal deficit present.      Mental Status: He is alert and oriented to person, place, and time.           Results from this visit  Results for orders placed or performed in visit on 05/24/21   Symptomatic COVID-19 Virus (Coronavirus) by PCR     Status: None    Specimen: Nasopharyngeal   Result Value Ref Range    COVID-19 Virus PCR to [facility] - Source Nasopharyngeal     COVID-19 Virus PCR to [facility] - Result       Test received-See reflex to IDDL test SARS CoV2 (COVID-19) Virus RT-PCR   SARS-CoV-2 COVID-19 Virus (Coronavirus) by PCR     Status: None    Specimen: Nasopharyngeal   Result Value Ref Range    SARS-CoV-2 Virus Specimen Source Nasopharyngeal     SARS-CoV-2 PCR Result NEGATIVE     SARS-CoV-2 PCR Comment       Testing was performed using the [employer] SARS-CoV-2 Assay on the [employer] Instrument System.   Additional information about this Emergency Use Authorization (EUA) assay can be found via   the Lab Guide.         ;This office note has been dictated.

## 2021-05-24 ENCOUNTER — OFFICE VISIT (OUTPATIENT)
Dept: FAMILY MEDICINE | Facility: CLINIC | Age: 36
End: 2021-05-24
Payer: COMMERCIAL

## 2021-05-24 VITALS
OXYGEN SATURATION: 98 % | SYSTOLIC BLOOD PRESSURE: 126 MMHG | HEART RATE: 72 BPM | TEMPERATURE: 99.4 F | DIASTOLIC BLOOD PRESSURE: 85 MMHG

## 2021-05-24 DIAGNOSIS — Z20.822 ENCOUNTER FOR LABORATORY TESTING FOR COVID-19 VIRUS: Primary | ICD-10-CM

## 2021-05-24 DIAGNOSIS — R05.9 COUGH: ICD-10-CM

## 2021-05-24 DIAGNOSIS — R06.02 SHORTNESS OF BREATH: ICD-10-CM

## 2021-05-24 DIAGNOSIS — R06.2 WHEEZING: ICD-10-CM

## 2021-05-24 DIAGNOSIS — H57.9 ITCHY EYES: ICD-10-CM

## 2021-05-24 LAB
SARS-COV-2 RNA RESP QL NAA+PROBE: NORMAL
SPECIMEN SOURCE: NORMAL

## 2021-05-24 PROCEDURE — U0005 INFEC AGEN DETEC AMPLI PROBE: HCPCS | Performed by: STUDENT IN AN ORGANIZED HEALTH CARE EDUCATION/TRAINING PROGRAM

## 2021-05-24 PROCEDURE — U0003 INFECTIOUS AGENT DETECTION BY NUCLEIC ACID (DNA OR RNA); SEVERE ACUTE RESPIRATORY SYNDROME CORONAVIRUS 2 (SARS-COV-2) (CORONAVIRUS DISEASE [COVID-19]), AMPLIFIED PROBE TECHNIQUE, MAKING USE OF HIGH THROUGHPUT TECHNOLOGIES AS DESCRIBED BY CMS-2020-01-R: HCPCS | Performed by: STUDENT IN AN ORGANIZED HEALTH CARE EDUCATION/TRAINING PROGRAM

## 2021-05-24 PROCEDURE — 99213 OFFICE O/P EST LOW 20 MIN: CPT | Mod: CS | Performed by: STUDENT IN AN ORGANIZED HEALTH CARE EDUCATION/TRAINING PROGRAM

## 2021-05-24 RX ORDER — BENZONATATE 100 MG/1
100-200 CAPSULE ORAL 3 TIMES DAILY PRN
Qty: 60 CAPSULE | Refills: 0 | Status: SHIPPED | OUTPATIENT
Start: 2021-05-24 | End: 2021-05-26

## 2021-05-24 RX ORDER — CETIRIZINE HYDROCHLORIDE 10 MG/1
10 TABLET ORAL DAILY
Qty: 90 TABLET | Refills: 3 | Status: SHIPPED | OUTPATIENT
Start: 2021-05-24 | End: 2021-08-30

## 2021-05-24 NOTE — PATIENT INSTRUCTIONS
Patient Education   Here is the plan from today's visit    1. Encounter for laboratory testing for COVID-19 virus  - Symptomatic COVID-19 Virus (Coronavirus) by PCR    2. Cough  - Symptomatic COVID-19 Virus (Coronavirus) by PCR  - ADULT POST COVID CLINIC REFERRAL; Future  - benzonatate (TESSALON) 100 MG capsule; Take 1-2 capsules (100-200 mg) by mouth 3 times daily as needed for cough  Dispense: 60 capsule; Refill: 0    3. Shortness of breath  - ADULT POST COVID CLINIC REFERRAL; Future    4. Wheezing  - ADULT POST COVID CLINIC REFERRAL; Future    5. Itchy eyes  - cetirizine (ZYRTEC) 10 MG tablet; Take 1 tablet (10 mg) by mouth daily  Dispense: 90 tablet; Refill: 3      Please call or return to clinic if your symptoms don't go away.    Follow up plan  No follow-ups on file.    Thank you for coming to [facility] today.  COVID-19 Vaccine:  If you are eligible for the COVID-19 vaccine, you can schedule via Tripar[facility] or call [facility] at [address] [place] [address]. If you need assistance with scheduling, please speak to a Care Coordinator or your provider.   Lab Testing:  **If you had lab testing today and your results are reassuring or normal they will be mailed to you or sent through [employer] within 7 days.   **If the lab tests need quick action we will call you with the results.  **If you are having labs done on a different day, please call [phone] to schedule at [facility] or [phone] for other [facility] locations.   The phone number we will call with results is # [phone] (home) . If this is not the best number please call our clinic and change the number.  Medication Refills:  If you need any refills please call your pharmacy and they will contact us.   If you need to  your refill at a new pharmacy, please contact the new pharmacy directly. The new pharmacy will help you get your medications transferred faster.   Scheduling:  If you have any concerns about today's visit or wish  to schedule another appointment please call our office during normal business hours [phone] (8-5:00 M-F)  If a referral was made to a [facility] and you don't get a call from central scheduling please call [phone].  If a Mammogram was ordered for you at [facility] call [phone] to schedule or change your appointment.  If you had an EKG/XRay/CT/Ultrasound/MRI ordered the number is [phone] to schedule or change your radiology appointment.   Medical Concerns:  If you have urgent medical concerns please call [phone] at any time of the day.    Sara Iglesias MD

## 2021-05-24 NOTE — PROGRESS NOTES
Preceptor Attestation:  Patient seen and evaluated in person. I discussed the patient with the resident. I have verified the content of the note, which accurately reflects my assessment of the patient and the plan of care.   Supervising Physician:  Davy Mejía DO.

## 2021-05-24 NOTE — LETTER
May 25, 2021      Thomas Sandhu  [address]   [place] [address]        Dear Thomas,    [facility] Please see below for your test results.    Resulted Orders   Symptomatic COVID-19 Virus (Coronavirus) by PCR   Result Value Ref Range    COVID-19 Virus PCR to [facility] - Source Nasopharyngeal     COVID-19 Virus PCR to [facility] - Result       Test received-See reflex to IDDL test SARS CoV2 (COVID-19) Virus RT-PCR   SARS-CoV-2 COVID-19 Virus (Coronavirus) by PCR   Result Value Ref Range    SARS-CoV-2 Virus Specimen Source Nasopharyngeal     SARS-CoV-2 PCR Result NEGATIVE       Comment:      SARS-CoV2 (COVID-19) RNA not detected, presumed negative.    SARS-CoV-2 PCR Comment       Testing was performed using the [employer] SARS-CoV-2 Assay on the [employer] Instrument System.   Additional information about this Emergency Use Authorization (EUA) assay can be found via   the Lab Guide.        Comment:      This test should be ordered for the detection of SARS-CoV-2 in individuals who   meet SARS-CoV-2 clinical and/or epidemiological criteria. Test performance is   unknown in asymptomatic patients.  This test is for in vitro diagnostic use under the FDA EUA for laboratories   certified under CLIA to perform high complexity testing. This test has not   been FDA cleared or approved.  A negative result does not rule out the presence of PCR inhibitors in the   specimen or target RNA in concentration below the limit of detection for the   assay. The possibility of a false negative should be considered if the   patient's recent exposure or clinical presentation suggests COVID-19.  This test was validated by the [facility]. This laboratory is certified under the Clinical   Laboratory Improvement Amendments of 1988 (CLIA-88) as qualified to perform   high complexity laboratory testing.         If you have any concerns about these  results please call and leave a message for me or send a MyChart message to the clinic.    Sincerely,    Sara Iglesias MD

## 2021-05-25 LAB
LABORATORY COMMENT REPORT: NORMAL
SARS-COV-2 RNA RESP QL NAA+PROBE: NEGATIVE
SPECIMEN SOURCE: NORMAL

## 2021-05-26 ENCOUNTER — ANCILLARY PROCEDURE (OUTPATIENT)
Dept: GENERAL RADIOLOGY | Facility: CLINIC | Age: 36
End: 2021-05-26
Attending: STUDENT IN AN ORGANIZED HEALTH CARE EDUCATION/TRAINING PROGRAM
Payer: COMMERCIAL

## 2021-05-26 ENCOUNTER — OFFICE VISIT (OUTPATIENT)
Dept: FAMILY MEDICINE | Facility: CLINIC | Age: 36
End: 2021-05-26
Payer: COMMERCIAL

## 2021-05-26 VITALS
HEIGHT: 74 IN | TEMPERATURE: 97.4 F | WEIGHT: 210.6 LBS | BODY MASS INDEX: 27.03 KG/M2 | SYSTOLIC BLOOD PRESSURE: 110 MMHG | OXYGEN SATURATION: 98 % | DIASTOLIC BLOOD PRESSURE: 78 MMHG | HEART RATE: 89 BPM

## 2021-05-26 DIAGNOSIS — R06.02 SHORTNESS OF BREATH: ICD-10-CM

## 2021-05-26 DIAGNOSIS — R05.9 COUGH: Primary | ICD-10-CM

## 2021-05-26 DIAGNOSIS — R30.0 DYSURIA: ICD-10-CM

## 2021-05-26 LAB
BILIRUBIN UR: NEGATIVE MG/DL
BLOOD UR: NEGATIVE MG/DL
GLUCOSE URINE: NEGATIVE
KETONES UR QL: ABNORMAL MG/DL
LEUKOCYTE ESTERASE UR: NEGATIVE
NITRITE UR QL STRIP: NEGATIVE MG/DL
PH UR STRIP: 5 [PH] (ref 4.5–8)
PROTEIN UR: NEGATIVE MG/DL
SP GR UR STRIP: >=1.03 (ref 1–1.03)
UROBILINOGEN UR STRIP-ACNC: ABNORMAL E.U./DL

## 2021-05-26 PROCEDURE — 87491 CHLMYD TRACH DNA AMP PROBE: CPT | Performed by: STUDENT IN AN ORGANIZED HEALTH CARE EDUCATION/TRAINING PROGRAM

## 2021-05-26 PROCEDURE — 71046 X-RAY EXAM CHEST 2 VIEWS: CPT | Mod: FY | Performed by: RADIOLOGY

## 2021-05-26 PROCEDURE — 87591 N.GONORRHOEAE DNA AMP PROB: CPT | Performed by: STUDENT IN AN ORGANIZED HEALTH CARE EDUCATION/TRAINING PROGRAM

## 2021-05-26 PROCEDURE — 99214 OFFICE O/P EST MOD 30 MIN: CPT | Performed by: STUDENT IN AN ORGANIZED HEALTH CARE EDUCATION/TRAINING PROGRAM

## 2021-05-26 PROCEDURE — 81003 URINALYSIS AUTO W/O SCOPE: CPT | Performed by: STUDENT IN AN ORGANIZED HEALTH CARE EDUCATION/TRAINING PROGRAM

## 2021-05-26 RX ORDER — FAMOTIDINE 20 MG/1
20 TABLET, FILM COATED ORAL 2 TIMES DAILY
Qty: 60 TABLET | Refills: 3 | Status: SHIPPED | OUTPATIENT
Start: 2021-05-26 | End: 2021-08-30

## 2021-05-26 RX ORDER — ALBUTEROL SULFATE 90 UG/1
2 AEROSOL, METERED RESPIRATORY (INHALATION) EVERY 6 HOURS
Qty: 18 G | Refills: 0 | Status: SHIPPED | OUTPATIENT
Start: 2021-05-26 | End: 2021-08-30

## 2021-05-26 RX ORDER — INHALER, ASSIST DEVICES
SPACER (EA) MISCELLANEOUS
Qty: 1 EACH | Refills: 0 | Status: SHIPPED | OUTPATIENT
Start: 2021-05-26 | End: 2021-08-30

## 2021-05-26 ASSESSMENT — MIFFLIN-ST. JEOR: SCORE: 1955.03

## 2021-05-26 NOTE — LETTER
May 27, 2021      Thomas Sandhu  [address]   [place] [address]        Dear Thomas,     Thank you for getting your care at [facility]. Please see below for your test results.    Your chest x-ray looked normal. It showed no infection.     Your urine tests look normal. They show no infection.     Resulted Orders   Urinalysis, Micro If (LabDAQ)   Result Value Ref Range    Specific Gravity Urine >=1.030 1.005 - 1.030    pH Urine 5.0 4.5 - 8.0    Leukocyte Esterase UR Negative NEGATIVE    Nitrite Urine Negative NEGATIVE mg/dL    Protein UR Negative NEGATIVE mg/dL    Glucose Urine Negative NEGATIVE    Ketones Urine Trace (A) NEGATIVE mg/dL    Urobilinogen mg/dL 0.2 E.U./dL 0.2 E.U./dL E.U./dL    Bilirubin UR Negative NEGATIVE mg/dL    Blood UR Negative NEGATIVE mg/dL   Neisseria gonorrhoeae PCR   Result Value Ref Range    Specimen Descrip Urine     N Gonorrhea PCR Negative NEG^Negative      Comment:      Negative for N. gonorrhoeae rRNA by transcription mediated amplification.  A negative result by transcription mediated amplification does not preclude   the presence of N. gonorrhoeae infection because results are dependent on   proper and adequate collection, absence of inhibitors, and sufficient rRNA to   be detected.     Chlamydia trachomatis PCR   Result Value Ref Range    Specimen Description Urine     Chlamydia Trachomatis PCR Negative NEG^Negative      Comment:      Negative for C. trachomatis rRNA by transcription mediated amplification.  A negative result by transcription mediated amplification does not preclude   the presence of C. trachomatis infection because results are dependent on   proper and adequate collection, absence of inhibitors, and sufficient rRNA to   be detected.         If you have any concerns about these results please call and leave a message for me or send a Hightail message to the clinic.    Sincerely,    Carmen Grande, DO

## 2021-05-26 NOTE — PROGRESS NOTES
Assessment & Plan     Cough  Shortness of breath  Chronic cough x1+ months. Negative COVID testing. Did recover completely from previous COVID infection so low suspicion this is sequelae. DDx allergy/post nasal drip vs GERD vs cough variant asthma vs some combination. History a little challenging with interpretation but based on description and exam, my highest suspicion for GERD which may be triggering asthma like rxn. Will target all 3 and have him return in a month to evaluate and peel off meds as indicated.  - famotidine (PEPCID) 20 MG tablet  Dispense: 60 tablet; Refill: 3  - albuterol (PROAIR HFA/PROVENTIL HFA/VENTOLIN HFA) 108 (90 Base) MCG/ACT inhaler  Dispense: 18 g; Refill: 0  - spacer (OPTICHAMBER RED) holding chamber  Dispense: 1 each; Refill: 0  - XR Chest 2 Views  - if getting relief from albuterol, would recommend formal spirometry testing     Dysuria  Discussed at a past visit but labs got canceled for some reason. Will reorder today.   - Urinalysis, Micro If (LabDAQ)  - Neisseria gonorrhoeae PCR  - Chlamydia trachomatis PCR      Ordering of each unique test  Prescription drug management  25 minutes spent on the date of the encounter doing chart review, history and exam, documentation and further activities per the note      Return in about 4 weeks (around 6/23/2021) for Follow up, with me or PCP .    DO EDWIN Santos [facility] MARKO Guzman is a 36 year old who presents for the following health issues     HPI     Gone for 2 months in [place], came back April 14, right after he came back, noticed he had a cough. Since onset, cough is getting worse every day. Dry cough. Has some central chest pain, feels like it's pain from coughing. Sx worse in the morning or wheneve rhe wakes up. Sometimes wakes him up overnight.   - sometimes sore throat. Still doesn't have taste or smell since COVID infection so can't really say if there's a sour taste in his mouth   -  "some associated shortness of breath, sometimes hard to breathe   - sometimes sneezing, not a lot woorse than usual. No watery eyes.       Traveled feb 16 April 14 return.   Symptoms started around 21st of April      Review of Systems   Pertinent positives and negatives per HPI.    No fever/chills  No night sweats       Objective    /78   Pulse 89   Temp 97.4  F (36.3  C) (Temporal)   Ht 1.88 m (6' 2\")   Wt 95.5 kg (210 lb 9.6 oz)   SpO2 98%   BMI 27.04 kg/m    Body mass index is 27.04 kg/m .  Physical Exam   GENERAL: healthy, alert and no distress  HEENT: conjunctiva clear, no nasal drainage, normal posterior oropharynx   NECK: no adenopathy, no asymmetry, masses, or scars and thyroid normal to palpation  RESP: few adventitious sounds - unclear if upper airway or faint wheezing, difficult to explain to pt to inhale/exhale through mouth and not nose. Normal respiratory effort. No rales or rhonchi.   CV: regular rate and rhythm, normal S1 S2, no S3 or S4, no murmur, click or rub, no peripheral edema and peripheral pulses strong  MS: no gross musculoskeletal defects noted, no edema  NEURO: Normal strength and tone, mentation intact and speech normal  PSYCH: mentation appears normal, affect normal/bright         "

## 2021-05-26 NOTE — NURSING NOTE
Due to patient being non-English speaking/uses sign language, an  was used for this visit. Only for face-to-face interpretation by an external agency, date and length of interpretation can be found on the scanned worksheet.     name: ID- [id number]   Agency: [employer] - telephone  Language: Pakistani   Telephone number: [phone]  Type of interpretation: Telephone, spoken

## 2021-05-26 NOTE — PATIENT INSTRUCTIONS
Patient Education   Here is the plan from today's visit    1. Cough    Chest x-ray today to make sure there is no infection     Continue taking cetirizine pill every day. This is for allergies.     Start taking an anti-acid medication to treat possible acid reflux. Take this twice a day.     I have also prescribed an albuterol inhaler to treat wheezing. Use 2 puffs of this as needed for wheezing.       - XR Chest 2 Views; Future  - famotidine (PEPCID) 20 MG tablet; Take 1 tablet (20 mg) by mouth 2 times daily  Dispense: 60 tablet; Refill: 3  - albuterol (PROAIR HFA/PROVENTIL HFA/VENTOLIN HFA) 108 (90 Base) MCG/ACT inhaler; Inhale 2 puffs into the lungs every 6 hours  Dispense: 18 g; Refill: 0  - spacer (OPTICHAMBER RED) holding chamber; Use as needed with albuterol inhaler  Dispense: 1 each; Refill: 0    2. Shortness of breath      Please call or return to clinic if your symptoms don't go away.    Follow up plan  No follow-ups on file.    Thank you for coming to [facility] today.  COVID-19 Vaccine:  If you are eligible for the COVID-19 vaccine, you can schedule via [employer] or call [facility] at [phone]. If you need assistance with scheduling, please speak to a Care Coordinator or your provider.   Lab Testing:  **If you had lab testing today and your results are reassuring or normal they will be mailed to you or sent through [employer] within 7 days.   **If the lab tests need quick action we will call you with the results.  **If you are having labs done on a different day, please call [phone] to schedule at [facility] or [phone] for other [facility] locations.   The phone number we will call with results is # [phone] (home) . If this is not the best number please call our clinic and change the number.  Medication Refills:  If you need any refills please call your pharmacy and they will contact us.   If you need to  your refill at a new pharmacy, please  contact the new pharmacy directly. The new pharmacy will help you get your medications transferred faster.   Scheduling:  If you have any concerns about today's visit or wish to schedule another appointment please call our office during normal business hours [phone] (8-5:00 M-F)  If a referral was made to a [facility] and you don't get a call from central scheduling please call [phone].  If a Mammogram was ordered for you at [facility] call [phone] to schedule or change your appointment.  If you had an EKG/XRay/CT/Ultrasound/MRI ordered the number is [phone] to schedule or change your radiology appointment.   Medical Concerns:  If you have urgent medical concerns please call [phone] at any time of the day.    Carmen Grande,

## 2021-05-27 LAB
C TRACH DNA SPEC QL NAA+PROBE: NEGATIVE
N GONORRHOEA DNA SPEC QL NAA+PROBE: NEGATIVE
SPECIMEN SOURCE: NORMAL
SPECIMEN SOURCE: NORMAL

## 2021-06-08 ENCOUNTER — VIRTUAL VISIT (OUTPATIENT)
Dept: FAMILY MEDICINE | Facility: CLINIC | Age: 36
End: 2021-06-08
Payer: COMMERCIAL

## 2021-06-08 DIAGNOSIS — Z86.19 HISTORY OF HELICOBACTER PYLORI INFECTION: ICD-10-CM

## 2021-06-08 DIAGNOSIS — R05.9 COUGH: ICD-10-CM

## 2021-06-08 DIAGNOSIS — K21.00 GASTROESOPHAGEAL REFLUX DISEASE WITH ESOPHAGITIS WITHOUT HEMORRHAGE: Primary | ICD-10-CM

## 2021-06-08 PROCEDURE — 99214 OFFICE O/P EST MOD 30 MIN: CPT | Mod: 95 | Performed by: STUDENT IN AN ORGANIZED HEALTH CARE EDUCATION/TRAINING PROGRAM

## 2021-06-08 RX ORDER — TETRACYCLINE HYDROCHLORIDE 500 MG/1
500 CAPSULE ORAL 4 TIMES DAILY
Qty: 56 CAPSULE | Refills: 0 | Status: SHIPPED | OUTPATIENT
Start: 2021-06-14 | End: 2021-06-28

## 2021-06-08 RX ORDER — BISMUTH SUBSALICYLATE 262 MG/1
2 TABLET, CHEWABLE ORAL
Qty: 112 TABLET | Refills: 0 | Status: SHIPPED | OUTPATIENT
Start: 2021-06-14 | End: 2021-06-28

## 2021-06-08 RX ORDER — METRONIDAZOLE 500 MG/1
250 TABLET ORAL
Qty: 28 TABLET | Refills: 0 | Status: SHIPPED | OUTPATIENT
Start: 2021-06-14 | End: 2021-06-28

## 2021-06-08 NOTE — PROGRESS NOTES
Thomas is a 36 year old who is being evaluated via a billable telephone visit.      What phone number would you like to be contacted at? [phone]  How would you like to obtain your AVS? Mail a copy    Assessment & Plan     Cough  Gastroesophageal reflux disease with esophagitis without hemorrhage  History of Helicobacter pylori infection  Patient is reporting increasing symptoms of GERD, burning after eating or drinking, increased cough, worst after eating and in the morning.  States he may have had a fever at some point but not consistent and does not have 1 now, no nausea, vomiting, blood in his cough or sputum, bloody vomit, change in bowel habits, abdominal pain between these episodes.  Recently returned from [facility].  Patient suspects he might have H. pylori, I agree that it is a significant possibility given its prominence in our community.  He is going to drop by for a stool sample to test for H. pylori early next week, we discussed that he can use Tums and other antacids for symptomatic relief until that time and that after he is dropped off a stool sample he will have quadruple therapy waiting for him.  Given its high prevalence, I am comfortable writing for quadruple therapy before the results of the H. pylori test return, discussed that if the test comes back negative I would call and have him discontinue the antibiotics and he will start this medication after he drops off his stool sample.  He will also discontinue the albuterol as it is not providing him any significant relief.  - Helicobacter pylori Antigen Stool  - CBC with platelets  - Comprehensive metabolic panel  - omeprazole (PRILOSEC) 20 MG DR capsule  Dispense: 28 capsule; Refill: 0  - metroNIDAZOLE (FLAGYL) 500 MG tablet  Dispense: 28 tablet; Refill: 0  - tetracycline (ACHROMYCIN/SUMYCIN) 500 MG capsule  Dispense: 56 capsule; Refill: 0  - bismuth subsalicylate (PEPTO BISMOL) 262 MG chewable tablet  Dispense: 112 tablet; Refill: 0    Come  in for lab visit, I will then have H pylori treatment and PPI ready when this is ready/     Return in about 1 week (around 6/15/2021) for Lab Work.    Catalina Root MD  [facility] MARKO Guzman is a 36 year old who presents for the following health issues.  An  is used for this visit.    HPI     Patient is having the same cough, possibly worse. The pepcid and albuterol are not helping at all.     New symptoms: every time he eats a meal he gets burning discomfort in his chest/stomach, even with water. He feels better when he has not eaten anything. He has felt warm, feverish, once, but not consistently. No nausea, no vomiting, no blood in his cough, no change in his stool. No worsened breath. He is wondering if he has H pylori, other people he knows about have had his symptoms and H. pylori.  He returned from [facility] after 2-month trip and developed these worsening cough within 2 weeks of his return.    Review of Systems   See HPI      Objective         Vitals:  No vitals were obtained today due to virtual visit.    Physical Exam   healthy, alert and no distress  PSYCH: Alert and oriented times 3; coherent speech, normal   rate and volume, able to articulate logical thoughts, able   to abstract reason, no tangential thoughts, no hallucinations   or delusions  His affect is normal and full  RESP: No cough, no audible wheezing, able to talk in full sentences  Remainder of exam unable to be completed due to telephone visits    No results found for this or any previous visit (from the past 24 hour(s)).      Phone call duration: 27 minutes

## 2021-06-08 NOTE — PATIENT INSTRUCTIONS
The symptoms you are reporting are consistent with H. pylori or GERD.  You can take Tums until you can drop off a stool sample, when you drop off stool sample and get your blood tests done I will have medicine waiting for you at the [facility] pharmacy.    Take omeprazole twice a day, morning and evening.  The other medicines you will take 4 times a day, before each meal and once before bed.  This is a 14-day regimen, you will take it for 2 weeks.    You can discontinue the albuterol.

## 2021-06-14 DIAGNOSIS — K21.00 GASTROESOPHAGEAL REFLUX DISEASE WITH ESOPHAGITIS WITHOUT HEMORRHAGE: ICD-10-CM

## 2021-06-14 LAB
ALBUMIN SERPL-MCNC: 3.5 G/DL (ref 3.4–5)
ALP SERPL-CCNC: 57 U/L (ref 40–150)
ALT SERPL W P-5'-P-CCNC: 25 U/L (ref 0–70)
ANION GAP SERPL CALCULATED.3IONS-SCNC: 3 MMOL/L (ref 3–14)
AST SERPL W P-5'-P-CCNC: 16 U/L (ref 0–45)
BILIRUB SERPL-MCNC: 0.4 MG/DL (ref 0.2–1.3)
BUN SERPL-MCNC: 6 MG/DL (ref 7–30)
CALCIUM SERPL-MCNC: 8.6 MG/DL (ref 8.5–10.1)
CHLORIDE SERPL-SCNC: 109 MMOL/L (ref 94–109)
CO2 SERPL-SCNC: 27 MMOL/L (ref 20–32)
CREAT SERPL-MCNC: 0.77 MG/DL (ref 0.66–1.25)
ERYTHROCYTE [DISTWIDTH] IN BLOOD BY AUTOMATED COUNT: 13.5 % (ref 10–15)
GFR SERPL CREATININE-BSD FRML MDRD: >90 ML/MIN/{1.73_M2}
GLUCOSE SERPL-MCNC: 105 MG/DL (ref 70–99)
HCT VFR BLD AUTO: 47.6 % (ref 40–53)
HGB BLD-MCNC: 15 G/DL (ref 13.3–17.7)
MCH RBC QN AUTO: 28 PG (ref 26.5–33)
MCHC RBC AUTO-ENTMCNC: 31.5 G/DL (ref 31.5–36.5)
MCV RBC AUTO: 89 FL (ref 78–100)
PLATELET # BLD AUTO: 319 10E9/L (ref 150–450)
POTASSIUM SERPL-SCNC: 3.6 MMOL/L (ref 3.4–5.3)
PROT SERPL-MCNC: 7.2 G/DL (ref 6.8–8.8)
RBC # BLD AUTO: 5.35 10E12/L (ref 4.4–5.9)
SODIUM SERPL-SCNC: 139 MMOL/L (ref 133–144)
WBC # BLD AUTO: 4.1 10E9/L (ref 4–11)

## 2021-06-14 PROCEDURE — 36415 COLL VENOUS BLD VENIPUNCTURE: CPT | Performed by: FAMILY MEDICINE

## 2021-06-14 PROCEDURE — 87338 HPYLORI STOOL AG IA: CPT | Performed by: FAMILY MEDICINE

## 2021-06-14 PROCEDURE — 85027 COMPLETE CBC AUTOMATED: CPT | Performed by: FAMILY MEDICINE

## 2021-06-14 PROCEDURE — 80053 COMPREHEN METABOLIC PANEL: CPT | Performed by: FAMILY MEDICINE

## 2021-06-15 LAB — H PYLORI AG STL QL IA: POSITIVE

## 2021-07-02 ENCOUNTER — OFFICE VISIT (OUTPATIENT)
Dept: FAMILY MEDICINE | Facility: CLINIC | Age: 36
End: 2021-07-02
Payer: COMMERCIAL

## 2021-07-02 VITALS
TEMPERATURE: 98.2 F | SYSTOLIC BLOOD PRESSURE: 123 MMHG | HEART RATE: 66 BPM | WEIGHT: 210.4 LBS | DIASTOLIC BLOOD PRESSURE: 80 MMHG | OXYGEN SATURATION: 99 % | BODY MASS INDEX: 27.01 KG/M2

## 2021-07-02 DIAGNOSIS — K21.00 GASTROESOPHAGEAL REFLUX DISEASE WITH ESOPHAGITIS WITHOUT HEMORRHAGE: Primary | ICD-10-CM

## 2021-07-02 PROCEDURE — 99213 OFFICE O/P EST LOW 20 MIN: CPT | Mod: GC | Performed by: STUDENT IN AN ORGANIZED HEALTH CARE EDUCATION/TRAINING PROGRAM

## 2021-07-02 RX ORDER — OMEPRAZOLE 40 MG/1
40 CAPSULE, DELAYED RELEASE ORAL DAILY
Qty: 30 CAPSULE | Refills: 0 | Status: SHIPPED | OUTPATIENT
Start: 2021-07-02 | End: 2021-08-01

## 2021-07-02 NOTE — PATIENT INSTRUCTIONS
Here is the plan from today's visit    1. Gastroesophageal reflux disease with esophagitis without hemorrhage  - omeprazole (PRILOSEC) 40 MG DR capsule; Take 1 capsule (40 mg) by mouth daily  Dispense: 30 capsule; Refill: 0    Please call or return to clinic if your symptoms don't go away.    Follow up plan  Return in about 1 month (around 8/2/2021) for Follow up, with any available provider.     Thank you for coming to [facility] today.  Lab Testing:  **If you had lab testing today and your results are reassuring or normal they will be mailed to you or sent through [employer] within 7 days.   **If the lab tests need quick action we will call you with the results.  The phone number we will call with results is # [phone] (home) . If this is not the best number please call our clinic and change the number.  Medication Refills:  If you need any refills please call your pharmacy and they will contact us.   If you need to  your refill at a new pharmacy, please contact the new pharmacy directly. The new pharmacy will help you get your medications transferred faster.   Scheduling:  If you have any concerns about today's visit or wish to schedule another appointment please call our office during normal business hours [phone] (8-5:00 M-F)   eferrals to [facility] please call [phone].   Mammogram Scheduling [phone]     XRay/CT/Ultrasound/MRI Scheduling [phone]    Medical Concerns:  If you have urgent medical concerns please call [phone] at any time of the day.    Ester Barr MD

## 2021-07-02 NOTE — PROGRESS NOTES
Assessment & Plan     Gastroesophageal reflux disease with esophagitis without hemorrhage  - omeprazole (PRILOSEC) 40 MG DR capsule; Take 1 capsule (40 mg) by mouth daily      Thomas continues to have a persistent cough following H pylori treatment. He had first reported symptoms of cough when he returned to [place] from a trip to [place] in 04/2021, although it is unclear from patient history if these cough symptoms are the same. CXR at 5/26 visit normal. Given worsening of the cough with meals and recent H. Pylori infection, cough most likely is related to GERD. Neck/chest MSK symptoms are most likely also related to cough. Will trial 1 month of 40mg Omeprazole with anticipated improvement in cough/MSK symptoms. Can consider H. Pylori elimination testing in coming months, so that he can continue to be on PPI for now. Counseled patient on GERD prevention behavior including remaining upright for at least 2 hours following meals.    Return in about 1 month (around 8/2/2021) for Follow up, with any available provider.    Ester Barr MD  PGY-1, [facility] MARKO Guzman is a 36 year old who presents for the following health issues:    HPI   Thomas presents for follow up after treatment for H pylori infection. He completed the full regimen and is reporting some improvement in symptoms, but continues to have a cough. He is no longer taking Pepcid or omeprazole. Cough worsens with meals and is not present when he has not eaten. He also reports having a sour taste in the back of his mouth that he attributes to the metronidazole, but it has improved since finishing treatment. When asked about abdominal pain, he instead points to his right upper chest/neck where he has intermittent discomfort. No pain with swallowing. No fever, body aches, throat pain.    History was limited by difficulty with phone interpretive services throughout visit.         Objective    /80 (BP Location: Left  arm, Patient Position: Sitting, Cuff Size: Adult Large)   Pulse 66   Temp 98.2  F (36.8  C) (Oral)   Wt 95.4 kg (210 lb 6.4 oz)   SpO2 99%   BMI 27.01 kg/m    Body mass index is 27.01 kg/m .  Physical Exam  Constitutional:       Appearance: Normal appearance.   Cardiovascular:      Rate and Rhythm: Normal rate and regular rhythm.   Pulmonary:      Effort: Pulmonary effort is normal.      Breath sounds: Normal breath sounds.   Abdominal:      General: Abdomen is flat.      Palpations: Abdomen is soft.   Musculoskeletal:      Comments: Mild tenderness to palpation just medial and superior to the R clavicle. Mild tenderness to palpation along R upper chest wall.    Skin:     General: Skin is warm and dry.   Neurological:      Mental Status: He is alert.        Patient did not cough during visit.      ----- Service Performed and Documented by Resident or Fellow ------

## 2021-07-02 NOTE — NURSING NOTE
Due to patient being non-English speaking/uses sign language, an  was used for this visit. Only for face-to-face interpretation by an external agency, date and length of interpretation can be found on the scanned worksheet.     name: Gabriel Loza  Agency: [employer]  Language: Swiss   Telephone number: [phone]  Type of interpretation: telephone, spoken

## 2021-07-26 NOTE — PROGRESS NOTES
Thomas is a 36 year old who is being evaluated via a billable video visit.      Video visit transitioned to phone visit due to technical issues with video conferencing.   Phone visit start time: 0918    Assessment & Plan     History of Helicobacter pylori infection  Patient with ongoing cough, previously thought to be related to h. Pylori infection. Now s/p quad therapy with minimal improvement. Needs test of cure after 2-week cessation of PPI. Patient voices wish for GI specialist referral, which may be indicated if no eradication of h. Pylori s/p quad therapy OR worsening of symptoms that appear to be c/w GERD/GI etiology. Given that patient has seen multiple providers about these ongoing concerns over the past months, he has requested close follow-up with a provider he has seen before. I have arranged an in-person visit with Dr. Barr, whom he worked with earlier this month, and have updated Dr. Barr in-person. This note will also be routed to her and patient's PCP.    2019 novel coronavirus disease (COVID-19)  Cough  Dysgeusia  Shortness of breath  Patient with ongoing symptoms that seem likely related to long-haul covid: ongoing cough (though could be related to GERD/h.pylori infection), occasional exertional SOB, dysgeusia (major complaint of patient). SOB and cough seem less likely to be related to asthma/reactive airway given no improvement with albuterol. No other infectious symptoms. Patient does not agree with idea of long-haul covid, and has not scheduled with post-covid clinic (referral placed in May 2021).  CXR from May 2021 without sign of infection or other intrapulmonary or cardiac pathology. COVID tests since February 2021 negative. CBC and CMP 6/2021 reassuring. Per above, patient will have close follow-up with appointment on 7/28/21 to further assess symptoms, check vitals/exam and discuss next steps (potentially helping to schedule with post-covid clinic with agreeable and h. Pylori test  of cure per above).       Return in 1 day (on 7/28/2021).     Nicki Rodriguez MD  [facility] MARKO Guzman is a 36 year old who presents for the following health issues     HPI     Chief Complaint   Patient presents with     Cough     F/U cough:     Cough:  History of GERD, seen in clinic 5/2021, 6/2021, 7/2021 for ongoing cough, thought to be related to GERD  Going on 4 months  Taking omeprazole, no improvement  Had COVID-19 2/2021, has had ongoing symptoms since that time  Dry cough, no specific triggers or worsening times of day  Treated for h. Pylori with no improvement 6/8/21 (quad therapy with flagyl and tetracycline)  Cough, dysguesia have persisted whole time  Taste and smell are a big problem. No weight loss.   Occasional SOB - worse with activity also worse with some foods  Tried albuterol but did not help.   No fever  No fatigue    Was referred to Post-COVID clinic, but patient does not believe this is related to COVID-19.       Review of Systems   Negative except as noted above      Objective           Vitals:  No vitals were obtained today due to virtual visit.    Physical Exam   GENERAL: Healthy, alert and no distress  RESP: No audible wheeze, cough, or visible cyanosis.  No visible retractions or increased work of breathing.    NEURO: Cranial nerves grossly intact.  Mentation and speech appropriate for age.  PSYCH: Mentation appears normal, affect normal/bright, judgement and insight intact, normal speech and appearance well-groomed.          Phone visit end time:  0950 (extensive time spent due to difficulty with initial phone  and calling new )

## 2021-07-27 ENCOUNTER — VIRTUAL VISIT (OUTPATIENT)
Dept: FAMILY MEDICINE | Facility: CLINIC | Age: 36
End: 2021-07-27
Payer: COMMERCIAL

## 2021-07-27 DIAGNOSIS — R43.2 DYSGEUSIA: ICD-10-CM

## 2021-07-27 DIAGNOSIS — R05.9 COUGH: ICD-10-CM

## 2021-07-27 DIAGNOSIS — R06.02 SHORTNESS OF BREATH: ICD-10-CM

## 2021-07-27 DIAGNOSIS — Z86.19 HISTORY OF HELICOBACTER PYLORI INFECTION: Primary | ICD-10-CM

## 2021-07-27 DIAGNOSIS — U07.1 2019 NOVEL CORONAVIRUS DISEASE (COVID-19): ICD-10-CM

## 2021-07-27 PROCEDURE — 99213 OFFICE O/P EST LOW 20 MIN: CPT | Mod: 95 | Performed by: STUDENT IN AN ORGANIZED HEALTH CARE EDUCATION/TRAINING PROGRAM

## 2021-07-27 NOTE — Clinical Note
Hey y'all!  Ester--sorry this is coming to you AFTER your visit with this patient, so mostly BENEDICT. I hope our conversation yesterday was helpful!    Sara: BENEDICT--we chatted briefly about this patient. Let me know if questions/concerns.

## 2021-07-27 NOTE — NURSING NOTE
Due to patient being non-English speaking/uses sign language, an  was used for this visit. Only for face-to-face interpretation by an external agency, date and length of interpretation can be found on the scanned worksheet.     name: Amy Peck  Agency: [employer]  Language: South African   Telephone number: [phone]  Type of interpretation: telephone, spoken

## 2021-07-27 NOTE — PROGRESS NOTES
Preceptor Attestation:  I discussed the patient with the resident. I have verified the content of the note, which accurately reflects my assessment of the patient and the plan of care.  Supervising Physician:  Carmen Grande DO

## 2021-07-28 ENCOUNTER — OFFICE VISIT (OUTPATIENT)
Dept: FAMILY MEDICINE | Facility: CLINIC | Age: 36
End: 2021-07-28
Payer: COMMERCIAL

## 2021-07-28 VITALS
HEART RATE: 90 BPM | OXYGEN SATURATION: 98 % | TEMPERATURE: 99 F | WEIGHT: 210 LBS | RESPIRATION RATE: 16 BRPM | DIASTOLIC BLOOD PRESSURE: 68 MMHG | SYSTOLIC BLOOD PRESSURE: 111 MMHG | BODY MASS INDEX: 26.96 KG/M2

## 2021-07-28 DIAGNOSIS — R43.2 DYSGEUSIA: ICD-10-CM

## 2021-07-28 DIAGNOSIS — R05.9 COUGH: ICD-10-CM

## 2021-07-28 DIAGNOSIS — Z86.19 HISTORY OF HELICOBACTER PYLORI INFECTION: Primary | ICD-10-CM

## 2021-07-28 DIAGNOSIS — U07.1 2019 NOVEL CORONAVIRUS DISEASE (COVID-19): ICD-10-CM

## 2021-07-28 DIAGNOSIS — R06.02 SHORTNESS OF BREATH: ICD-10-CM

## 2021-07-28 PROCEDURE — 99214 OFFICE O/P EST MOD 30 MIN: CPT | Mod: GC | Performed by: STUDENT IN AN ORGANIZED HEALTH CARE EDUCATION/TRAINING PROGRAM

## 2021-07-28 RX ORDER — FLUTICASONE PROPIONATE 50 MCG
1 SPRAY, SUSPENSION (ML) NASAL DAILY
Qty: 11.1 ML | Refills: 0 | Status: SHIPPED | OUTPATIENT
Start: 2021-07-28 | End: 2021-08-11

## 2021-07-28 NOTE — PROGRESS NOTES
Assessment & Plan     Cough   History of Helicobacter pylori infection  2019 novel coronavirus disease (COVID-19)  Dysgeusia  SOB    Thomas is being seen again for follow up for subacute cough that started in May 2021, 2 weeks following return from 2 month trip to [facility]; was diagnosed w/ H. Pylori in June. S/p COVID infection in Feb 2021. Several providers have seen Thomas for this issue; ddx for cough up to this point has been H. Pylori/GERD vs long-haul COVID vs reactive airway. Extensive time was spent today elucidating his symptoms and course. In my two visits with him, Thomas has been clear that his cough is worse with eating and drinking, and is not present when he has an empty stomach. It is associated with SOB at times. It improved drastically with H. Pylori quad therapy (according to him today) but has not fully resolved. He feels the cough is related to his stomach/esophagus. At this point my priority is to confirm H. Pylori eradication; this could be an issue of treatment failure/abx resistance vs other GI etiology. He desires non-stool eradication testing. He will do urea breath testing in 1-2 weeks; PPI stopped 7/27.    He saw no improvement with albuterol and symptoms do not appear consistent with reactive airway/asthma picture. I do not feel spirometry testing is warranted at this point.     He is more open to possibility of long-haul COVID explanation after I went through [facility] website on the topic with him. It is possible to get new symptoms even after a full recovery period. Per patient's request, contacted the post-COVID clinic to see when they would be able to see him next. They are booked out until September. He might be interested in pursing this if GI workup is a dead end. Will address again at my next visit with him as well as COVID vaccination.    PND has not been in ddx give lack of symptoms/signs on PE. However, given prevalence of upper airway cough syndrome associated w/ PND and his  "recent allergic symptoms such as itchy eyes, will prescribe 2 week trial of Flonase.    - Helicobacter pylori Breath Test Adult; Future  - fluticasone (FLONASE) 50 MCG/ACT nasal spray; Spray 1 spray into both nostrils daily for 14 days     Needs and desires more provider continuity given non-straightforward syndrome.     Return in about 3 weeks (around 8/18/2021) for Follow up, with me.    Ester Barr MD  PGY-1, [facility]      Subjective   Thomas is a 36 year old who presents for the following health issues:    HPI     Cough follow up:    - No different since I last saw him on 7/2/21  - Continues to be a dry cough that is associated with eating and drinking and is improved when he has not eaten/empty stomach. Worse with acidic foods like tomatoes.   - With cough, he feels short of breath  - Does not have sensation of mucous or drainage in nose or throat. No sinus or facial pain or pressure.  - Does not have fever, body aches, or other systemic symptoms  - Has been taking 40mg omeprazole daily up until yesterday (per Dr. LOMELI to check H pylori eradication) without improvement  - Has not been using albuterol because it was not helpful  - Had used albuterol consistently for a month when he had the cough. Used the spacer, demonstrated he was using correctly. No improvement.  - Continues to have distorted sense of smell - everything smells the same, whether it is \"cooking, perfume, a fart\". Sour smell.  - Reports altered smell started during H. Pylori treatment last month  - Feels there is an issue in his stomach  - Does not have stomach pain  - Does not have sensation/concern of food getting stuck   - Wanting H. Pylori eradication testing; interested in non-stool testing   - Recovered from Feb 2021 COVID infection  - Cough and smell symptoms started 2-3 months following recovery  - Open now to [facility] post-COVID clinic if they can see him soon  - Travelled to [facility] in Feb to April this year. " Cough started 2 weeks after return; was tested and positive for H. Pylori about one month after that, in mid-June.  - Experienced significant improvement following H. Pylori treatment, but is concerned that cough has lingered, although not as bad as it was.   - Has not had treatment for PND-associated cough    Review of Systems   Negative except as noted above.       Objective    /68   Pulse 90   Temp 99  F (37.2  C) (Oral)   Resp 16   Wt 95.3 kg (210 lb)   SpO2 98%   BMI 26.96 kg/m    Body mass index is 26.96 kg/m .  Physical Exam   GENERAL: healthy, alert and no distress  EYES: Eyes grossly normal to inspection, PERRL and conjunctivae and sclerae normal  HENT: normal cephalic/atraumatic, nose and mouth without ulcers or lesions, oropharynx clear and oral mucous membranes moist. No rhinorrhea or oropharyngeal drainage. No cobblestoning noted.   NECK: no adenopathy, no asymmetry, masses, or scars and thyroid normal to palpation  RESP: lungs clear to auscultation - no rales, rhonchi or wheezes  CV: regular rate and rhythm, normal S1 S2, no S3 or S4, no murmur, click or rub, no peripheral edema and peripheral pulses strong  ABDOMEN: soft, nontender, no hepatosplenomegaly, no masses and bowel sounds normal  MS: no gross musculoskeletal defects noted, no edema    ----- Service Performed and Documented by Resident or Fellow ------

## 2021-07-28 NOTE — PATIENT INSTRUCTIONS
Here is the plan from today's visit    1. History of Helicobacter pylori infection  - We will check with the breath test if you still have H pylori    2. Cough  - Try the nasal spray daily for 2 weeks to see if it helps your cough    Follow up plan  Return in about 3 weeks (around 8/18/2021) for Follow up, with me.   DR. Barbara Barr  Thank you for coming to [facility] today.  Lab Testing:  **If you had lab testing today and your results are reassuring or normal they will be mailed to you or sent through [employer] within 7 days.   **If the lab tests need quick action we will call you with the results.  The phone number we will call with results is # [phone] (home) . If this is not the best number please call our clinic and change the number.  Medication Refills:  If you need any refills please call your pharmacy and they will contact us.   If you need to  your refill at a new pharmacy, please contact the new pharmacy directly. The new pharmacy will help you get your medications transferred faster.   Scheduling:  If you have any concerns about today's visit or wish to schedule another appointment please call our office during normal business hours [phone] (8-5:00 M-F)   eferrals to [facility] please call [phone].   Mammogram Scheduling [phone]     XRay/CT/Ultrasound/MRI Scheduling [phone]    Medical Concerns:  If you have urgent medical concerns please call [phone] at any time of the day.    Ester Barr MD    
[facility]

## 2021-07-28 NOTE — NURSING NOTE
Due to patient being non-English speaking/uses sign language, an  was used for this visit. Only for face-to-face interpretation by an external agency, date and length of interpretation can be found on the scanned worksheet.     name: Alokbonitamaikel Henry  Language: Egyptian   Agency: [employer]   Phone number: [phone]  Type of interpretation: Telephone spoken

## 2021-08-09 ENCOUNTER — LAB (OUTPATIENT)
Dept: LAB | Facility: CLINIC | Age: 36
End: 2021-08-09
Payer: COMMERCIAL

## 2021-08-09 DIAGNOSIS — Z86.19 HISTORY OF HELICOBACTER PYLORI INFECTION: ICD-10-CM

## 2021-08-30 ENCOUNTER — LAB (OUTPATIENT)
Dept: LAB | Facility: CLINIC | Age: 36
End: 2021-08-30

## 2021-08-30 ENCOUNTER — OFFICE VISIT (OUTPATIENT)
Dept: FAMILY MEDICINE | Facility: CLINIC | Age: 36
End: 2021-08-30
Payer: COMMERCIAL

## 2021-08-30 VITALS
OXYGEN SATURATION: 98 % | WEIGHT: 208.8 LBS | TEMPERATURE: 98.2 F | HEIGHT: 74 IN | BODY MASS INDEX: 26.8 KG/M2 | SYSTOLIC BLOOD PRESSURE: 107 MMHG | DIASTOLIC BLOOD PRESSURE: 70 MMHG | HEART RATE: 92 BPM

## 2021-08-30 DIAGNOSIS — M25.50 MULTIPLE JOINT PAIN: ICD-10-CM

## 2021-08-30 DIAGNOSIS — Z86.19 HISTORY OF HELICOBACTER PYLORI INFECTION: Primary | ICD-10-CM

## 2021-08-30 DIAGNOSIS — K30 INDIGESTION: ICD-10-CM

## 2021-08-30 PROCEDURE — 82306 VITAMIN D 25 HYDROXY: CPT

## 2021-08-30 PROCEDURE — 87338 HPYLORI STOOL AG IA: CPT

## 2021-08-30 PROCEDURE — 99213 OFFICE O/P EST LOW 20 MIN: CPT | Mod: GC | Performed by: STUDENT IN AN ORGANIZED HEALTH CARE EDUCATION/TRAINING PROGRAM

## 2021-08-30 PROCEDURE — 36415 COLL VENOUS BLD VENIPUNCTURE: CPT

## 2021-08-30 RX ORDER — BISMUTH SUBSALICYLATE 262 MG/1
1 TABLET, CHEWABLE ORAL
Qty: 180 TABLET | Refills: 1 | Status: SHIPPED | OUTPATIENT
Start: 2021-08-30 | End: 2021-10-14

## 2021-08-30 ASSESSMENT — MIFFLIN-ST. JEOR: SCORE: 1946.86

## 2021-08-30 NOTE — PATIENT INSTRUCTIONS
[facility]  Lab and Imaging Center  Floor 1  [address] [place] [address]  Hours:   Monday-Friday: 6:30AM - 5:00PM  Saturday: 7:00AM - 12:00PM  Appointment needed  Phone: [phone]    [facility]  Outpatient Lab  [facility], First Floor  [address] [place] [address]  Hours:   Monday - Friday 7:30a-5:30p  No appointment needed - Walk-Ins available    [facility]  First Floor Draw Station  [address] [place] [address]  Hours:   Monday-Friday 7:30a-5:30p  Appointment needed  Phone: [phone]     Here is the plan from today's visit    1. History of Helicobacter pylori infection  - Helicobacter pylori Antigen Stool; Future    2. Indigestion  - bismuth subsalicylate (PEPTO BISMOL) 262 MG chewable tablet; Take 1 tablet (262 mg) by mouth 4 times daily (before meals and nightly)  Dispense: 180 tablet; Refill: 1    3. Multiple joint pain  - Vitamin D deficiency screening; Future    Please call or return to clinic if your symptoms don't go away.    Follow up plan  No follow-ups on file.     Thank you for coming to [facility] today.  Lab Testing:  **If you had lab testing today and your results are reassuring or normal they will be mailed to you or sent through boolino within 7 days.   **If the lab tests need quick action we will call you with the results.  The phone number we will call with results is # [phone] (home) . If this is not the best number please call our clinic and change the number.  Medication Refills:  If you need any refills please call your pharmacy and they will contact us.   If you need to  your refill at a new pharmacy, please contact the new pharmacy directly. The new pharmacy will help you get your medications transferred faster.   Scheduling:  If you have any concerns about today's visit or wish to schedule another appointment please call our  office during normal business hours [phone] (8-5:00 M-F)   eferrals to [facility] please call [phone].   Mammogram Scheduling [phone]     XRay/CT/Ultrasound/MRI Scheduling [phone]    Medical Concerns:  If you have urgent medical concerns please call [phone] at any time of the day.    Ester Barr MD

## 2021-08-30 NOTE — PROGRESS NOTES
Assessment & Plan     History of Helicobacter pylori infection  Deepti Guzman is a 37 y/o M who presents for follow up regarding his subacute chronic cough and gas/bloating symptoms. See 7/28 note for detailed discussion on this issue. Because the lab was unable to do the H. Pylori breath test, we will do the stool antigen testing instead. He picked this up this morning already. The goal at this point is to confirm H. Pylori eradication as this could be an issue of treatment failure/abx resistance vs other GI etiology. Will consider referral if H. Pylori negative. Will not do empiric therapy at this time given he already had quad therapy earlier in June. For his gas/bloating symptoms we will try Pepto Bismol as this worked for him when he went through quad therapy.  - Helicobacter pylori Antigen Stool; Future  - bismuth subsalicylate (PEPTO BISMOL) 262 MG chewable tablet; Take 1 tablet (262 mg) by mouth 4 times daily (before meals and nightly)    Multiple joint pain  Patient desires vitamin testing for L knee pain. Does not desire other workup at this time. There was not a lot of time to discuss this problem or do an appropriate exam since it was brought up at the end of the appointment, but we will do vitamin D deficiency screening for now.   - Vitamin D deficiency screening; Future    I will contact patient about his H pylori and vitamin D test results and will determine next steps from there.      Ester Barr MD  PGY-1, [facility]      Subjective   Thomas is a 36 year old who presents for the following health issues     HPI     Thomas presents for follow up for subacute cough. I have seen him twice before for this issue (see my note from our last visit 7/28 for details of his symptoms and course). The plan from our last visit was to get a H. Pylori breath test to confirm presence/eradication and to go from there. He was wanting to avoid another stool test if possible. He was  "instructed to go to the [facility] outpatient lab; however, they are apparently unable to do that test and cancelled my order.     His symptoms remain the same as before. Today he says he felt a little increased gassy stomach pain. He is still not taking the omeprazole. Wondering if there is a medicine he can take besides omeprazole for his gas. No chest pain, n/v, hematemesis, hematochezia, or breathing issues.    At the end of our appointment he says he has L knee pain that has been present for a month, is present at all times but is worse with activity, and was not associated with an injury or inciting event. He would like to test for \"all of the vitamins\" as one of his friends had.     Stomach symptoms are the same as before  Wondering if there is a medicine he can take besides omeprazole        Objective    /70   Pulse 92   Temp 98.2  F (36.8  C) (Oral)   Ht 1.88 m (6' 2\")   Wt 94.7 kg (208 lb 12.8 oz)   SpO2 98%   BMI 26.81 kg/m    Body mass index is 26.81 kg/m .  Physical Exam  Constitutional:       Appearance: Normal appearance. He is normal weight.   HENT:      Head: Normocephalic and atraumatic.   Eyes:      Extraocular Movements: Extraocular movements intact.      Conjunctiva/sclera: Conjunctivae normal.   Cardiovascular:      Rate and Rhythm: Normal rate.   Pulmonary:      Effort: Pulmonary effort is normal. No respiratory distress.   Abdominal:      General: Abdomen is flat.      Palpations: Abdomen is soft.   Musculoskeletal:      Cervical back: Normal range of motion.      Right lower leg: No edema.      Left lower leg: No edema.   Skin:     General: Skin is warm and dry.   Neurological:      Mental Status: He is alert and oriented to person, place, and time. Mental status is at baseline.   Psychiatric:         Mood and Affect: Mood normal.         Behavior: Behavior normal.         Thought Content: Thought content normal.         Judgment: Judgment normal.          ----- Service " Performed and Documented by Resident or Fellow ------

## 2021-08-30 NOTE — NURSING NOTE
Due to patient being non-English speaking/uses sign language, an  was used for this visit. Only for face-to-face interpretation by an external agency, date and length of interpretation can be found on the scanned worksheet.     name: Mayra Silvestre  Language: Bulgarian  Agency: [employer]  Phone number: [phone]  Type of interpretation: telephone, spoken

## 2021-08-31 LAB
DEPRECATED CALCIDIOL+CALCIFEROL SERPL-MC: 23 UG/L (ref 20–75)
H PYLORI AG STL QL IA: POSITIVE

## 2021-09-02 DIAGNOSIS — A04.8 H. PYLORI INFECTION: Primary | ICD-10-CM

## 2021-09-02 RX ORDER — LEVOFLOXACIN 500 MG/1
500 TABLET, FILM COATED ORAL DAILY
Qty: 14 TABLET | Refills: 0 | Status: SHIPPED | OUTPATIENT
Start: 2021-09-02 | End: 2021-09-16

## 2021-09-02 RX ORDER — AMOXICILLIN 500 MG/1
1000 CAPSULE ORAL 2 TIMES DAILY
Qty: 56 CAPSULE | Refills: 0 | Status: SHIPPED | OUTPATIENT
Start: 2021-09-02 | End: 2021-09-16

## 2021-09-02 RX ORDER — PANTOPRAZOLE SODIUM 40 MG/1
40 TABLET, DELAYED RELEASE ORAL
Qty: 28 TABLET | Refills: 0 | Status: SHIPPED | OUTPATIENT
Start: 2021-09-02 | End: 2021-09-16

## 2021-09-02 NOTE — RESULT ENCOUNTER NOTE
Please call patient about his results and new prescriptions:    Your stool test was positive for H pylori infection. Because your first treatment did not work, we will use different medicines to treat it this time. I have sent the 3 medicines to the [facility]:  - Amoxicillin - take 2 pills two times daily  - Levofloxacin - take 1 pill daily  - Pantoprazole - take 1 pill twice daily before meals.    Suggested regimen would be in the morning to take 2 pills amoxicillin, 1 levofloxacin, and 1 pantoprazole before breakfast, then before dinner take 2 pills amoxicillin and 1 pill pantoprazole.    Come see me in 3-4 weeks if your symptoms don't go away.    Your vitamin D test was normal. You could consider taking a daily multivitamin pill, but maybe wait until after we treat your H pylori.     Let us know if you have any questions.

## 2021-10-11 ENCOUNTER — VIRTUAL VISIT (OUTPATIENT)
Dept: FAMILY MEDICINE | Facility: CLINIC | Age: 36
End: 2021-10-11
Payer: COMMERCIAL

## 2021-10-11 DIAGNOSIS — Z86.19 HISTORY OF HELICOBACTER PYLORI INFECTION: Primary | ICD-10-CM

## 2021-10-11 PROCEDURE — 87338 HPYLORI STOOL AG IA: CPT | Performed by: STUDENT IN AN ORGANIZED HEALTH CARE EDUCATION/TRAINING PROGRAM

## 2021-10-11 PROCEDURE — 99213 OFFICE O/P EST LOW 20 MIN: CPT | Mod: 95 | Performed by: STUDENT IN AN ORGANIZED HEALTH CARE EDUCATION/TRAINING PROGRAM

## 2021-10-11 NOTE — PROGRESS NOTES
Thomas is a 36 year old who is being evaluated via a billable video visit.      How would you like to obtain your AVS? Mail a copy  If the video visit is dropped, the invitation should be resent by: Text to cell phone: [phone]  Will anyone else be joining your video visit? No     Video Start Time: 1:00    Assessment & Plan     History of Helicobacter pylori infection  Several month history of indigestion, abdominal pain, and acid reflux following meals. Per chart review, patient failed quadruple therapy in June, and recently completed levofloxacin-based therapy.   - Helicobacter pylori Antigen Stool to test for eradication following second therapy trial   - UPPER GI ENDOSCOPY; Future regardless of H. Pylori status, given persistent symptoms. If H.Pylori remains positive, subsequent therapy will be guided by culture and susceptibilities.        Judy aBe MD  [facility] Gu[facility] is a 36 year old who presents for the following health issues:    Continued symptoms related to H.Pylori. epigastric pain, acid reflux, flatulence. Cough has improved significantly from before. Patient completed 14 days of treatment with levofloxacin-based treatment for H.Pylori after previously failing quadruple therapy.     Review of Systems    ROS: 10 point ROS neg other than the symptoms noted above in the HPI.      Objective       Vitals:  No vitals were obtained today due to virtual visit.    Physical Exam   GENERAL: Healthy, alert and no distress  EYES: Eyes grossly normal to inspection.  No discharge or erythema  RESP: No audible wheeze, cough. No visible retractions or increased work of breathing.    SKIN: Visible skin clear. No significant rash, abnormal pigmentation or lesions.  NEURO: Cranial nerves grossly intact.  Mentation and speech appropriate for age.  PSYCH: Mentation appears normal, affect normal/bright, judgement and insight intact, normal speech        Video-Visit  Details    Type of service:  Video Visit    Video End Time:1:40    Originating Location (pt. Location): Home    Distant Location (provider location):  [facility]     Platform used for Video Visit: PacketHop

## 2021-10-11 NOTE — NURSING NOTE
Due to patient being non-English speaking/uses sign language, an  was used for this visit. Only for face-to-face interpretation by an external agency, date and length of interpretation can be found on the scanned worksheet.     name: Mayra Silvestre  Language: Malian  Agency: [employer]  Phone number: [phone]  Type of interpretation: Telephone, spoken

## 2021-10-12 LAB — H PYLORI AG STL QL IA: NEGATIVE

## 2021-10-15 ENCOUNTER — TELEPHONE (OUTPATIENT)
Dept: FAMILY MEDICINE | Facility: CLINIC | Age: 36
End: 2021-10-15

## 2021-10-15 NOTE — TELEPHONE ENCOUNTER
Author attempted to reach patient to discuss Gastroenterology  referral scheduling. Left message with main clinic number . If patient returns call please transfer to Amy.    Amy Zepeda, Care Coordinator

## 2021-10-21 ENCOUNTER — TELEPHONE (OUTPATIENT)
Dept: FAMILY MEDICINE | Facility: CLINIC | Age: 36
End: 2021-10-21

## 2021-10-21 NOTE — TELEPHONE ENCOUNTER
Patient requesting lab results from visit on 10/11. Appears CC also tried to reach out to patient on 10/15. Routing to seeing provider and CC to advise.     Katty Thomason RN

## 2021-10-21 NOTE — TELEPHONE ENCOUNTER
[facility] phone call message- patient requesting results.    Test: Lab    Date of test: 10/11/2021    Additional Comments: Patient's wife reports patient still sick. Been 2 weeks and haven't heard anything about test results. Would like a call ASAP. Please call [phone]    Lab tab checked to verify if result comment present with in each order: Yes    Letters tab checked to verify if lab result letter has been entered: Yes    OK to leave a message on voice mail? Yes    Advised patient response may take up to 2 business days: Yes    Primary language: Czech      needed? Yes    Call taken on October 21, 2021 at 10:18 AM by Dona Larsen    Route to [facility]

## 2021-10-22 ENCOUNTER — TELEPHONE (OUTPATIENT)
Dept: FAMILY MEDICINE | Facility: CLINIC | Age: 36
End: 2021-10-22

## 2021-10-22 ENCOUNTER — TELEPHONE (OUTPATIENT)
Dept: GASTROENTEROLOGY | Facility: CLINIC | Age: 36
End: 2021-10-22

## 2021-10-22 DIAGNOSIS — Z11.59 ENCOUNTER FOR SCREENING FOR OTHER VIRAL DISEASES: ICD-10-CM

## 2021-10-22 NOTE — TELEPHONE ENCOUNTER
[facility] phone call message- general phone call:    Reason for call: Called again regarding lab results done on 10/11/2021. Told waiting for provider to respond.     Action Desired:  Requesting call from a RN.    Return call needed: Yes    OK to leave a message on voice mail? Yes    Advised patient response may take up to 2 business days: Yes    Primary language: New Zealander      needed? Yes    Call taken on October 22, 2021 at 1:34 PM by Dona Mcbride to [facility] TRIAGE

## 2021-10-22 NOTE — TELEPHONE ENCOUNTER
Screening Questions  1. Are you active on mychart?    2. What insurance is in the chart? [employer]    2.  Ordering/Referring Provider: uJdy Bae MD    3. BMI 27.2    4. Do you have any Lung issues?  No  If yes continue:   Do you use daily home oxygen?    Do you have Pulmonary Hypertension?    Do you have SEVERE asthma?     5. Have you had a heart, lung, or liver transplant? No    6. Are you currently on dialysis or have chronic kidney disease? no    7. Have you had a stroke or Transient ischemic attack (TIA) within 6 months? No     8. In the past 6 months, have you had any heart related issues including cardiomyopathy or heart attack? No      If yes, did it require cardiac stenting or other implantable device?      9. Do you have any implantable devices in your body (pacemaker, defib, LVAD)? No     10. Do you take nitroglycerin? If yes, how often? No     11. Are you currently taking any blood thinners?no     12. Are you a diabetic? No    13. (Females) Are you currently pregnant?   If yes, how many weeks?      15. Are you taking any prescription pain medications on a routine schedule?  no If yes, MAC sedation.    16. Do you have any chemical dependencies such as alcohol, street drugs, or methadone? noIf yes, MAC sedation.    17. Do you have any history of post-traumatic stress syndrome, severe anxiety or history of psychosis? No     18. Do you transfer independently? YEs     19.  Do you have any issues with constipation?     20. Preferred Pharmacy for Pre Prescription [facility] [place], [place] - [address]    Scheduling Details    Which Colonoscopy Prep was Sent?:   Procedure Scheduled: EGD  Provider/Surgeon: Maynor  Date of Procedure: 11/1/2021  [place] [facility]  Caller (Please ask for phone number if not scheduled by patient): Thomas Renner      Sedation Type: MAC  Conscious Sedation- Needs  for 6 hours after the procedure  MAC/General-Needs  for 24 hours after  procedure    Pre-op Required at [facility] [place], [place] and OR for MAC sedation:   (if yes advise patient they will need a pre-op prior to procedure)      Is patient on blood thinners? -no (If yes- inform patient to follow up with PCP or provider for follow up instructions)     Informed patient they will need an adult  yes  Cannot take any type of public or medical transportation alone    [facility] or [facility]: yes at external    Confirmed Nurse will call to complete assessment yes    Additional comments: no

## 2021-10-22 NOTE — TELEPHONE ENCOUNTER
RN spoke to Dr. Bae directly who confirmed that this round of H. Pylori is negative- she is recommending patient schedule his endoscopy for further work up if still having symptoms.     RN contacted patient and relayed this information via  [phone]. He verbalized understanding- states he continues to have the upper GI discomfort and would like to move forward with the EGD. Patient requesting we call and schedule for him- Mondays or Fridays work best. RN contacted [facility] EGD scheduling at [phone] spoke with Trina who was able to see order and will call patient now to set up as she has screening questions for him to answer and instructions for day of prep. Routing to provider as FYI and CC to make sure it is scheduled.   Trina Jones RN

## 2021-10-22 NOTE — TELEPHONE ENCOUNTER
Patient's wife calling again about test results from 10/11/21- duplicate encounter, see details in encounter from 10/21/21.   Trina Jones RN

## 2021-10-25 ENCOUNTER — TELEPHONE (OUTPATIENT)
Dept: GASTROENTEROLOGY | Facility: CLINIC | Age: 36
End: 2021-10-25

## 2021-10-25 NOTE — LETTER
October 26, 2021      Thomas Sandhu  [address]   [place] [address]              Dear Thomas,          Instructions for Your Upper Endoscopy  Your exam is on 11/1/2021 Arrival Time: 12:30pm  Please note that your procedure time may change  Check in at: [facility]; [address], [place] [address]     What is an upper endoscopy?  - This is an exam that checks for problems linked to heartburn, swallowing or belly (abdominal) pain or other symptoms of the upper GI tract. Depending on your symptoms, the doctor will look at your esophagus (food pipe), stomach and the part of the stomach that enters the small intestine.      Getting ready  - You must arrange for an adult to drive you home and stay with you after your exam. This person will need to stay with you for 24 hours unless your provider says otherwise.   - Your exam cannot be done unless you have proper transportation. If you need to use public transportation someone must ride with you.  - Dress in comfortable, loose clothing.  - Bring your insurance card. Leave your purse, billfold, credit cards and other valuables at home.  - Bring a list of your medicines and known allergies. If you have a pacemaker or ICD, please bring your information card.  - We do our best to stay on time, but there may be a delay. Please bring something to pass the time, such as a newspaper or book.     - Important: You must complete all steps before the exam.      Seven days before the exam - Date: 10/25/2021  - Talk to your doctor: If you take blood-thinners (such as Coumadin, Plavix, Xarelto), your prescription or schedule may need to change before the test.  - Continue taking prescribed aspirin; talk to your prescribing doctor with any concerns.     - If you have diabetes: Ask to have your exam early in the morning. Also, ask your doctor if you should change your diet or medicines     One day before the exam - Date: 10/31/2021  -Stop  eating all solid foods at 10 p.m. You may drink clear liquids.      Day of the exam - Date: 11/1/2021  -You may drink clear liquids until 6 hours before your exam.  -You may take all of your morning medicines (except for diabetes pills) as usual with 4 oz. of water up to 6 hours before your test.  -If you take diabetes medicine (pills): do not take them the morning of your test.  -Bring a list of your medicines and known allergies.  -Please arrive with an adult who can take you home after the test: The medicine will make you sleepy. If you do not have a , we may cancel your test.          What are clear liquids?   You may have:  - Water, tea, coffee (no cream)  - Soda pop, Gatorade   - Clear nutrition drinks (Enlive, Resource Breeze)   - Jell-O, Popsicles (no milk or fruit pieces) or sorbet   - Fat-free soup broth or bouillon  - Plain hard cand, such as clear life savers   - Clear juices and fruit-flavored drinks such as apple juice, white grape juice, Hi-C and Efrain-Aid     Do not have:  - Milk or milk products such as ice cream, malts or shakes  - Juices with pulp such as orange, grapefruit, pineapple or tomato juice  - Cream soups of any kind  - Alcohol         During the exam  - The exam lasts from 10 to 20 minutes.   - We will review the risks and benefits of the exam. We will then ask you to sign a consent form.  - We will place a small needle (IV) in your hand or arm. We will give you medicine through the IV to keep you comfortable.   -We will spray a numbing medicine into your throat. This will reduce gagging.   - We will help you swallow a flexible tube (the endoscope). You may feel some discomfort at first. The tube will not get in the way of your breathing.  - You will not be able to talk with the endoscope in your mouth. Use hand signals instead.  - When we put air into your stomach, you may feel full or have mild cramps. We will remove the air at the end of the exam.  - To reduce discomfort,  breathe at a slow, even pace. Try to relax the muscles in your neck and shoulders.  - We may take a small piece of tissue (a biopsy) to test in the lab. It will not hurt. We may also take pictures of your insides.      After the exam  - You will rest in the recovery area until you feel ready to leave. This takes about 30 to 60 minutes.   - We will remove the IV.  - You may burp up air left in your stomach.  - You may feel drowsy or a little dizzy from the medicine.   - Your doctor will discuss your results. You will receive your test results in 7 to 10 business days by letter or MyChart.   - Your throat may feel numb. One hour after the exam, swallow a small amount of cool water. If you can swallow easily, you may go back to your regular diet and medicines.  - Your throat may be sore for the rest of the day. Throat lozenges or ice chips may help.  - Do not drive for 24 hours.     Call us at once if you have:  - Unusual pain or problems swallowing, unusual stomach or chest pain.  - Vomit that looks like coffee grounds or black or bloody stools (bowel movements).  - A fever above 100.6  F (37.5  C) when taken under the tongue.     Test results  - You will receive your results in 7 to 10 business days by phone, letter or MyChart.     Schedule your Covid Test:   Please ensure your COVID test is scheduled within 96 hours or 4 days of your procedure. If you have not been contacted to schedule please call [phone].     For questions or appointments, call:  [facility]   [phone], option 2  Monday through Friday, 8 a.m. to 4:30 p.m.  (If it is after hours, call [phone]. Ask for the GI fellow resident on call.)     You should be aware that your endoscopist may be a part of a study to improve endoscopy procedures.  As part of a study, pictures gathered from your procedure may be stored and analyzed in a de-identified manner.

## 2021-10-25 NOTE — TELEPHONE ENCOUNTER
Attempted to contact patient regarding upcoming EGD procedure on 11/1/21 for pre assessment questions with assistance of [place]e  id [phone]. No answer.     Left message to return call to [phone] #2    Covid test scheduled?    Olya Glover RN

## 2021-10-25 NOTE — TELEPHONE ENCOUNTER
Pre-visit planning for upcoming procedure.     Patient scheduled for EGD on 11/1/21    Covid test scheduled? Per scheduling encounter patient is having done externally. Confirm date is within 4 days of procedure.    Arrival time: 1230    Facility location: [facility]    Sedation type: MAC    Indication for procedure: hx of h.pylori (order is under referrals)    Anticoagulations? no       Olya Glover RN

## 2021-10-26 NOTE — TELEPHONE ENCOUNTER
Frederic  via phone.    Writer reviewed pre-assessment questions with patient prior to upcoming EGD on 11.1.2021.      Covid test scheduled: 10.30.2021    Arrival time: 1230    Facility location: [facility]    Sedation type: MAC    Electronic Implantable devices? No    Blood thinners/Antiplatelet medication? No    Reviewed EGD prep instructions with patient.      Designated  policy reviewed with patient.     Patient verbalized understanding.  No further questions or concerns.    Medina Escobar RN

## 2021-10-29 ENCOUNTER — ANESTHESIA EVENT (OUTPATIENT)
Dept: SURGERY | Facility: AMBULATORY SURGERY CENTER | Age: 36
End: 2021-10-29
Payer: COMMERCIAL

## 2021-10-30 ENCOUNTER — LAB (OUTPATIENT)
Dept: LAB | Facility: CLINIC | Age: 36
End: 2021-10-30
Attending: INTERNAL MEDICINE
Payer: COMMERCIAL

## 2021-10-30 DIAGNOSIS — Z11.59 ENCOUNTER FOR SCREENING FOR OTHER VIRAL DISEASES: ICD-10-CM

## 2021-10-30 LAB — SARS-COV-2 RNA RESP QL NAA+PROBE: NEGATIVE

## 2021-10-30 PROCEDURE — U0003 INFECTIOUS AGENT DETECTION BY NUCLEIC ACID (DNA OR RNA); SEVERE ACUTE RESPIRATORY SYNDROME CORONAVIRUS 2 (SARS-COV-2) (CORONAVIRUS DISEASE [COVID-19]), AMPLIFIED PROBE TECHNIQUE, MAKING USE OF HIGH THROUGHPUT TECHNOLOGIES AS DESCRIBED BY CMS-2020-01-R: HCPCS

## 2021-11-01 ENCOUNTER — ANESTHESIA (OUTPATIENT)
Dept: SURGERY | Facility: AMBULATORY SURGERY CENTER | Age: 36
End: 2021-11-01
Payer: COMMERCIAL

## 2021-11-01 ENCOUNTER — HOSPITAL ENCOUNTER (OUTPATIENT)
Facility: AMBULATORY SURGERY CENTER | Age: 36
End: 2021-11-01
Attending: INTERNAL MEDICINE
Payer: COMMERCIAL

## 2021-11-01 VITALS
BODY MASS INDEX: 26.69 KG/M2 | SYSTOLIC BLOOD PRESSURE: 115 MMHG | HEART RATE: 79 BPM | RESPIRATION RATE: 20 BRPM | DIASTOLIC BLOOD PRESSURE: 72 MMHG | HEIGHT: 74 IN | OXYGEN SATURATION: 100 % | WEIGHT: 208 LBS | TEMPERATURE: 97.5 F

## 2021-11-01 VITALS — HEART RATE: 78 BPM

## 2021-11-01 DIAGNOSIS — K21.9 GERD WITHOUT ESOPHAGITIS: Primary | ICD-10-CM

## 2021-11-01 LAB — UPPER GI ENDOSCOPY: NORMAL

## 2021-11-01 PROCEDURE — 87176 TISSUE HOMOGENIZATION CULTR: CPT | Mod: 90 | Performed by: PATHOLOGY

## 2021-11-01 PROCEDURE — 88342 IMHCHEM/IMCYTCHM 1ST ANTB: CPT | Mod: TC | Performed by: INTERNAL MEDICINE

## 2021-11-01 PROCEDURE — 43239 EGD BIOPSY SINGLE/MULTIPLE: CPT

## 2021-11-01 PROCEDURE — 99000 SPECIMEN HANDLING OFFICE-LAB: CPT | Performed by: PATHOLOGY

## 2021-11-01 PROCEDURE — 88305 TISSUE EXAM BY PATHOLOGIST: CPT | Mod: 26 | Performed by: PATHOLOGY

## 2021-11-01 PROCEDURE — 87070 CULTURE OTHR SPECIMN AEROBIC: CPT | Mod: 90 | Performed by: PATHOLOGY

## 2021-11-01 PROCEDURE — 88342 IMHCHEM/IMCYTCHM 1ST ANTB: CPT | Mod: 26 | Performed by: PATHOLOGY

## 2021-11-01 RX ORDER — NALOXONE HYDROCHLORIDE 0.4 MG/ML
0.2 INJECTION, SOLUTION INTRAMUSCULAR; INTRAVENOUS; SUBCUTANEOUS
Status: DISCONTINUED | OUTPATIENT
Start: 2021-11-01 | End: 2021-11-02 | Stop reason: HOSPADM

## 2021-11-01 RX ORDER — NALOXONE HYDROCHLORIDE 0.4 MG/ML
0.4 INJECTION, SOLUTION INTRAMUSCULAR; INTRAVENOUS; SUBCUTANEOUS
Status: DISCONTINUED | OUTPATIENT
Start: 2021-11-01 | End: 2021-11-02 | Stop reason: HOSPADM

## 2021-11-01 RX ORDER — PROPOFOL 10 MG/ML
INJECTION, EMULSION INTRAVENOUS PRN
Status: DISCONTINUED | OUTPATIENT
Start: 2021-11-01 | End: 2021-11-01

## 2021-11-01 RX ORDER — PROCHLORPERAZINE MALEATE 10 MG
10 TABLET ORAL EVERY 6 HOURS PRN
Status: DISCONTINUED | OUTPATIENT
Start: 2021-11-01 | End: 2021-11-02 | Stop reason: HOSPADM

## 2021-11-01 RX ORDER — ONDANSETRON 4 MG/1
4 TABLET, ORALLY DISINTEGRATING ORAL EVERY 30 MIN PRN
Status: DISCONTINUED | OUTPATIENT
Start: 2021-11-01 | End: 2021-11-01 | Stop reason: HOSPADM

## 2021-11-01 RX ORDER — ONDANSETRON 2 MG/ML
4 INJECTION INTRAMUSCULAR; INTRAVENOUS EVERY 30 MIN PRN
Status: DISCONTINUED | OUTPATIENT
Start: 2021-11-01 | End: 2021-11-01 | Stop reason: HOSPADM

## 2021-11-01 RX ORDER — SODIUM CHLORIDE, SODIUM LACTATE, POTASSIUM CHLORIDE, CALCIUM CHLORIDE 600; 310; 30; 20 MG/100ML; MG/100ML; MG/100ML; MG/100ML
INJECTION, SOLUTION INTRAVENOUS CONTINUOUS PRN
Status: DISCONTINUED | OUTPATIENT
Start: 2021-11-01 | End: 2021-11-01

## 2021-11-01 RX ORDER — LABETALOL HYDROCHLORIDE 5 MG/ML
10 INJECTION, SOLUTION INTRAVENOUS
Status: DISCONTINUED | OUTPATIENT
Start: 2021-11-01 | End: 2021-11-01 | Stop reason: HOSPADM

## 2021-11-01 RX ORDER — SODIUM CHLORIDE, SODIUM LACTATE, POTASSIUM CHLORIDE, CALCIUM CHLORIDE 600; 310; 30; 20 MG/100ML; MG/100ML; MG/100ML; MG/100ML
INJECTION, SOLUTION INTRAVENOUS CONTINUOUS
Status: DISCONTINUED | OUTPATIENT
Start: 2021-11-01 | End: 2021-11-01 | Stop reason: HOSPADM

## 2021-11-01 RX ORDER — ONDANSETRON 2 MG/ML
INJECTION INTRAMUSCULAR; INTRAVENOUS PRN
Status: DISCONTINUED | OUTPATIENT
Start: 2021-11-01 | End: 2021-11-01

## 2021-11-01 RX ORDER — FENTANYL CITRATE 50 UG/ML
50 INJECTION, SOLUTION INTRAMUSCULAR; INTRAVENOUS EVERY 5 MIN PRN
Status: DISCONTINUED | OUTPATIENT
Start: 2021-11-01 | End: 2021-11-01 | Stop reason: HOSPADM

## 2021-11-01 RX ORDER — GLYCOPYRROLATE 0.2 MG/ML
INJECTION, SOLUTION INTRAMUSCULAR; INTRAVENOUS PRN
Status: DISCONTINUED | OUTPATIENT
Start: 2021-11-01 | End: 2021-11-01

## 2021-11-01 RX ORDER — ONDANSETRON 2 MG/ML
4 INJECTION INTRAMUSCULAR; INTRAVENOUS EVERY 6 HOURS PRN
Status: DISCONTINUED | OUTPATIENT
Start: 2021-11-01 | End: 2021-11-02 | Stop reason: HOSPADM

## 2021-11-01 RX ORDER — OXYCODONE HYDROCHLORIDE 5 MG/1
5 TABLET ORAL EVERY 4 HOURS PRN
Status: DISCONTINUED | OUTPATIENT
Start: 2021-11-01 | End: 2021-11-02 | Stop reason: HOSPADM

## 2021-11-01 RX ORDER — SIMETHICONE
LIQUID (ML) MISCELLANEOUS PRN
Status: DISCONTINUED | OUTPATIENT
Start: 2021-11-01 | End: 2021-11-01 | Stop reason: HOSPADM

## 2021-11-01 RX ORDER — LIDOCAINE HYDROCHLORIDE 20 MG/ML
INJECTION, SOLUTION INFILTRATION; PERINEURAL PRN
Status: DISCONTINUED | OUTPATIENT
Start: 2021-11-01 | End: 2021-11-01

## 2021-11-01 RX ORDER — OMEPRAZOLE 40 MG/1
40 CAPSULE, DELAYED RELEASE ORAL
Qty: 60 CAPSULE | Refills: 3 | Status: SHIPPED | OUTPATIENT
Start: 2021-11-01 | End: 2024-04-04

## 2021-11-01 RX ORDER — LIDOCAINE 40 MG/G
CREAM TOPICAL
Status: DISCONTINUED | OUTPATIENT
Start: 2021-11-01 | End: 2021-11-01 | Stop reason: HOSPADM

## 2021-11-01 RX ORDER — HYDROMORPHONE HYDROCHLORIDE 1 MG/ML
0.4 INJECTION, SOLUTION INTRAMUSCULAR; INTRAVENOUS; SUBCUTANEOUS EVERY 5 MIN PRN
Status: DISCONTINUED | OUTPATIENT
Start: 2021-11-01 | End: 2021-11-01 | Stop reason: HOSPADM

## 2021-11-01 RX ORDER — ONDANSETRON 4 MG/1
4 TABLET, ORALLY DISINTEGRATING ORAL EVERY 6 HOURS PRN
Status: DISCONTINUED | OUTPATIENT
Start: 2021-11-01 | End: 2021-11-02 | Stop reason: HOSPADM

## 2021-11-01 RX ORDER — PROPOFOL 10 MG/ML
INJECTION, EMULSION INTRAVENOUS CONTINUOUS PRN
Status: DISCONTINUED | OUTPATIENT
Start: 2021-11-01 | End: 2021-11-01

## 2021-11-01 RX ORDER — FLUMAZENIL 0.1 MG/ML
0.2 INJECTION, SOLUTION INTRAVENOUS
Status: DISCONTINUED | OUTPATIENT
Start: 2021-11-01 | End: 2021-11-02 | Stop reason: HOSPADM

## 2021-11-01 RX ADMIN — GLYCOPYRROLATE 0.2 MG: 0.2 INJECTION, SOLUTION INTRAMUSCULAR; INTRAVENOUS at 14:09

## 2021-11-01 RX ADMIN — PROPOFOL 200 MCG/KG/MIN: 10 INJECTION, EMULSION INTRAVENOUS at 13:55

## 2021-11-01 RX ADMIN — ONDANSETRON 4 MG: 2 INJECTION INTRAMUSCULAR; INTRAVENOUS at 14:09

## 2021-11-01 RX ADMIN — SODIUM CHLORIDE, SODIUM LACTATE, POTASSIUM CHLORIDE, CALCIUM CHLORIDE: 600; 310; 30; 20 INJECTION, SOLUTION INTRAVENOUS at 13:55

## 2021-11-01 RX ADMIN — LIDOCAINE HYDROCHLORIDE 50 MG: 20 INJECTION, SOLUTION INFILTRATION; PERINEURAL at 13:55

## 2021-11-01 RX ADMIN — PROPOFOL 50 MG: 10 INJECTION, EMULSION INTRAVENOUS at 14:03

## 2021-11-01 RX ADMIN — PROPOFOL 50 MG: 10 INJECTION, EMULSION INTRAVENOUS at 13:59

## 2021-11-01 RX ADMIN — PROPOFOL 50 MG: 10 INJECTION, EMULSION INTRAVENOUS at 14:05

## 2021-11-01 ASSESSMENT — MIFFLIN-ST. JEOR: SCORE: 1943.23

## 2021-11-01 ASSESSMENT — COPD QUESTIONNAIRES: COPD: 0

## 2021-11-01 ASSESSMENT — ENCOUNTER SYMPTOMS: DYSRHYTHMIAS: 0

## 2021-11-01 ASSESSMENT — LIFESTYLE VARIABLES: TOBACCO_USE: 0

## 2021-11-01 NOTE — LETTER
November 3, 2021      Thomas Sandhu  [address]   [place] [address]        Dear Thomas Do,    The biopsies of your stomach and small intestine are normal.    There is no evidence of h pylori. That is good. That has been treated effectively and is now gone.    If you have any questions please do not hesitate to call my nurse coordinator, Mikayla Holbrook, at [phone].    Sincerely,  Dr. Mcguire    Resulted Orders   Helicobacter Pylori Culture   Result Value Ref Range    Culture Culture negative, monitoring continues    Surgical Pathology Exam   Result Value Ref Range    Case Report       Surgical Pathology Report                         Case: [id number]                                  Authorizing Provider:  Davy Mcguire,  Collected:           11/01/2021 02:08 PM                                 MD                                                                           Ordering Location:     [facility] OR  Received:            11/01/2021 02:29 PM                                 [facility]                                                                  Pathologist:           Dixie Vadlovinos MD                                                   Specimens:   A) - Other, duodenal biopsy                                                                         B) - Other, gastric biopsy                                                                 Final Diagnosis       A(1). Duodenal biopsy:  -Duodenal mucosa with no significant histopathologic abnormality; features of celiac sprue are absent    B(2). Gastric biopsy:  -Gastric oxyntic mucosa with no significant histopathologic abnormality  -Negative for H. Pylori on immunohistochemical staining  -Negative for intestinal metaplasia and dysplasia      Clinical Information       History of Helicobacter pylori infection.  Rule out celiac disease        Gross Description       A(1). Other, duodenal biopsy:  The  "specimen is received in formalin with proper patient identification, labeled \"duodenal biopsy\".  The specimen consists of 4 pieces of pink-tan soft tissue ranging in size from 0.2 to 0.3 cm in greatest dimension, which are entirely submitted in cassette A1.     B(2). Other, gastric biopsy:  The specimen is received in formalin with proper patient identification, labeled \"gastric biopsy\".  The specimen consists of 2 pieces of pink-tan soft tissue measuring 0.3 and 0.4 cm in greatest dimension, which are entirely submitted in cassette B1.         Microscopic Description       Microscopic examination is performed.         Performing Labs       The technical component of this testing was completed at [facility]      Case Images                   "

## 2021-11-01 NOTE — LETTER
November 3, 2021      Thomas Sandhu  [address]   [place] [address]        Dear Thomas Do,    The biopsies of your stomach and small intestine are normal.    There is no evidence of h pylori. That is good. That has been treated effectively and is now gone.    If you have any questions please do not hesitate to call my nurse coordinator, Mikayla Holbrook, at [phone].    Sincerely,  Dr. Mcguire      Resulted Orders   Helicobacter Pylori Culture   Result Value Ref Range    Culture Culture negative, monitoring continues    Surgical Pathology Exam   Result Value Ref Range    Case Report       Surgical Pathology Report                         Case: [id number]                                  Authorizing Provider:  Davy Mcguire,  Collected:           11/01/2021 02:08 PM                                 MD                                                                           Ordering Location:     [facility] OR  Received:            11/01/2021 02:29 PM                                 [facility]                                                                  Pathologist:           Dixie Valdovinos MD                                                   Specimens:   A) - Other, duodenal biopsy                                                                         B) - Other, gastric biopsy                                                                 Final Diagnosis       A(1). Duodenal biopsy:  -Duodenal mucosa with no significant histopathologic abnormality; features of celiac sprue are absent    B(2). Gastric biopsy:  -Gastric oxyntic mucosa with no significant histopathologic abnormality  -Negative for H. Pylori on immunohistochemical staining  -Negative for intestinal metaplasia and dysplasia      Clinical Information       History of Helicobacter pylori infection.  Rule out celiac disease        Gross Description       A(1). Other, duodenal biopsy:  The  "specimen is received in formalin with proper patient identification, labeled \"duodenal biopsy\".  The specimen consists of 4 pieces of pink-tan soft tissue ranging in size from 0.2 to 0.3 cm in greatest dimension, which are entirely submitted in cassette A1.     B(2). Other, gastric biopsy:  The specimen is received in formalin with proper patient identification, labeled \"gastric biopsy\".  The specimen consists of 2 pieces of pink-tan soft tissue measuring 0.3 and 0.4 cm in greatest dimension, which are entirely submitted in cassette B1.         Microscopic Description       Microscopic examination is performed.         Performing Labs       The technical component of this testing was completed at [facility]      Case Images                   "

## 2021-11-01 NOTE — H&P
"Thomas Sandhu  [id number]  male  36 year old      Reason for procedure/surgery: Reflux symptoms. History of h pylori. Now s/p treatment wit h pylori neg stool antigen    Patient Active Problem List   Diagnosis     Chronic bilateral low back pain without sciatica     Acquired flat back syndrome     Cyst on ear     Other constipation     Subclinical hypothyroidism     2019 novel coronavirus disease (COVID-19)       Past Surgical History:  History reviewed. No pertinent surgical history.    Past Medical History: History reviewed. No pertinent past medical history.    Social History:   Social History     Tobacco Use     Smoking status: Never Smoker     Smokeless tobacco: Never Used   Substance Use Topics     Alcohol use: No       Family History: History reviewed. No pertinent family history.    Allergies: No Known Allergies    Active Medications:   No current outpatient medications on file.       Systemic Review:   CONSTITUTIONAL: NEGATIVE for fever, chills, change in weight  ENT/MOUTH: NEGATIVE for ear, mouth and throat problems  RESP: NEGATIVE for significant cough or SOB  CV: NEGATIVE for chest pain, palpitations or peripheral edema    Physical Examination:   Vital Signs: /79   Pulse 79   Temp 97.5  F (36.4  C) (Temporal)   Resp 18   Ht 1.88 m (6' 2\")   Wt 94.3 kg (208 lb)   SpO2 100%   BMI 26.71 kg/m    GENERAL: healthy, alert and no distress  NECK: no adenopathy, no asymmetry, masses, or scars  RESP: lungs clear to auscultation - no rales, rhonchi or wheezes  CV: regular rate and rhythm, normal S1 S2, no S3 or S4, no murmur, click or rub, no peripheral edema and peripheral pulses strong  ABDOMEN: soft, nontender, no hepatosplenomegaly, no masses and bowel sounds normal  MS: no gross musculoskeletal defects noted, no edema    Plan: Appropriate to proceed as scheduled.      Davy Mcguire MD  11/1/2021    PCP:  Sara Iglesias    "

## 2021-11-01 NOTE — ANESTHESIA POSTPROCEDURE EVALUATION
Patient: Thomas Sandhu    Procedure: Procedure(s):  ESOPHAGOGASTRODUODENOSCOPY, WITH BIOPSY       Diagnosis:History of Helicobacter pylori infection [Z86.19]  Diagnosis Additional Information: No value filed.    Anesthesia Type:  MAC    Note:  Disposition: Outpatient   Postop Pain Control: Uneventful            Sign Out: Well controlled pain   PONV: No   Neuro/Psych: Uneventful            Sign Out: Acceptable/Baseline neuro status   Airway/Respiratory: Uneventful            Sign Out: Acceptable/Baseline resp. status   CV/Hemodynamics: Uneventful            Sign Out: Acceptable CV status; No obvious hypovolemia; No obvious fluid overload   Other NRE: NONE   DID A NON-ROUTINE EVENT OCCUR? No           Last vitals:  Vitals Value Taken Time   /72 11/01/21 1440   Temp 36.4  C (97.5  F) 11/01/21 1440   Pulse     Resp 20 11/01/21 1440   SpO2 100 % 11/01/21 1440       Electronically Signed By: Christen Bass MD  November 1, 2021  6:10 PM

## 2021-11-01 NOTE — ANESTHESIA PREPROCEDURE EVALUATION
Anesthesia Pre-Procedure Evaluation    Patient: Thomas Sandhu   MRN: [id number] : 1985        Preoperative Diagnosis: History of Helicobacter pylori infection [Z86.19]    Procedure : Procedure(s):  ESOPHAGOGASTRODUODENOSCOPY (EGD)          History reviewed. No pertinent past medical history.   History reviewed. No pertinent surgical history.   No Known Allergies   Social History     Tobacco Use     Smoking status: Never Smoker     Smokeless tobacco: Never Used   Substance Use Topics     Alcohol use: No      Wt Readings from Last 1 Encounters:   21 94.3 kg (208 lb)        Anesthesia Evaluation   Pt has not had prior anesthetic         ROS/MED HX  ENT/Pulmonary:     (+) recent URI, COVID in February with residual symptoms :  (-) tobacco use, asthma and COPD   Neurologic:    (-) no CVA and no TIA   Cardiovascular:    (-) hypertension, arrhythmias, irregular heartbeat/palpitations and ICD   METS/Exercise Tolerance: >4 METS    Hematologic:    (-) anemia   Musculoskeletal:       GI/Hepatic:     (+) GERD, Symptomatic,  (-) cholecystitis/cholelithiasis and liver disease   Renal/Genitourinary:    (-) renal disease   Endo:     (+) thyroid problem, hypothyroidism,  (-) Type II DM and obesity   Psychiatric/Substance Use:    (-) psychiatric history   Infectious Disease:    (-) Recent Fever   Malignancy:       Other:            Physical Exam    Airway        Mallampati: II   TM distance: > 3 FB   Neck ROM: full   Mouth opening: > 3 cm    Respiratory Devices and Support         Dental  no notable dental history         Cardiovascular   cardiovascular exam normal          Pulmonary   pulmonary exam normal                OUTSIDE LABS:  CBC:   Lab Results   Component Value Date    WBC 4.1 2021    HGB 15.0 2021    HCT 47.6 2021     2021     BMP:   Lab Results   Component Value Date     2021    POTASSIUM 3.6 2021    CHLORIDE 109 2021    CO2 27 2021    BUN 6 (L)  06/14/2021    CR 0.77 06/14/2021     (H) 06/14/2021     COAGS: No results found for: PTT, INR, FIBR  POC: No results found for: BGM, HCG, HCGS  HEPATIC:   Lab Results   Component Value Date    ALBUMIN 3.5 06/14/2021    PROTTOTAL 7.2 06/14/2021    ALT 25 06/14/2021    AST 16 06/14/2021    ALKPHOS 57 06/14/2021    BILITOTAL 0.4 06/14/2021     OTHER:   Lab Results   Component Value Date    AUBREY 8.6 06/14/2021    TSH 3.95 04/29/2021    T4 0.93 10/09/2019       Anesthesia Plan    ASA Status:  2   NPO Status:  NPO Appropriate    Anesthesia Type: MAC.     - Reason for MAC: immobility needed   Induction: Propofol.           Consents    Anesthesia Plan(s) and associated risks, benefits, and realistic alternatives discussed. Questions answered and patient/representative(s) expressed understanding.     - Discussed with:  Patient,       - Extended Intubation/Ventilatory Support Discussed: No.      - Patient is DNR/DNI Status: No    Use of blood products discussed: No .     Postoperative Care    Pain management: IV analgesics.   PONV prophylaxis: Background Propofol Infusion     Comments:    Discussion using . Patient denies any other health problems besides reflux and denies taking any other medications. Appropriately NPO. Discussed plan for IV anesthetic with native airway.  Patient voiced understanding of plan and had no further questions.             Devan Ivan MD

## 2021-11-01 NOTE — ANESTHESIA CARE TRANSFER NOTE
Patient: Thomas Sandhu    Procedure: Procedure(s):  ESOPHAGOGASTRODUODENOSCOPY, WITH BIOPSY       Diagnosis: History of Helicobacter pylori infection [Z86.19]  Diagnosis Additional Information: No value filed.    Anesthesia Type:   MAC     Note:    Oropharynx: spontaneously breathing  Level of Consciousness: awake and drowsy  Oxygen Supplementation: room air    Independent Airway: airway patency satisfactory and stable  Dentition: dentition unchanged  Vital Signs Stable: post-procedure vital signs reviewed and stable  Report to RN Given: handoff report given  Patient transferred to: Phase II    Handoff Report: Identifed the Patient, Identified the Reponsible Provider, Reviewed the pertinent medical history, Discussed the surgical course, Reviewed Intra-OP anesthesia mangement and issues during anesthesia, Set expectations for post-procedure period and Allowed opportunity for questions and acknowledgement of understanding      Vitals:  Vitals Value Taken Time   /74    Temp 96.9    Pulse 78 11/01/21 1415   Resp     SpO2 100        Electronically Signed By: ALBA Dickson CRNA  November 1, 2021  2:24 PM

## 2021-11-03 LAB
PATH REPORT.COMMENTS IMP SPEC: NORMAL
PATH REPORT.COMMENTS IMP SPEC: NORMAL
PATH REPORT.FINAL DX SPEC: NORMAL
PATH REPORT.GROSS SPEC: NORMAL
PATH REPORT.MICROSCOPIC SPEC OTHER STN: NORMAL
PATH REPORT.RELEVANT HX SPEC: NORMAL
PHOTO IMAGE: NORMAL

## 2021-11-08 LAB — BACTERIA TISS BX CULT: NORMAL

## 2022-05-23 ENCOUNTER — OFFICE VISIT (OUTPATIENT)
Dept: FAMILY MEDICINE | Facility: CLINIC | Age: 37
End: 2022-05-23
Payer: COMMERCIAL

## 2022-05-23 VITALS
DIASTOLIC BLOOD PRESSURE: 75 MMHG | RESPIRATION RATE: 16 BRPM | TEMPERATURE: 98.4 F | WEIGHT: 207 LBS | HEART RATE: 80 BPM | OXYGEN SATURATION: 98 % | BODY MASS INDEX: 26.58 KG/M2 | SYSTOLIC BLOOD PRESSURE: 107 MMHG

## 2022-05-23 DIAGNOSIS — R05.9 COUGH: Primary | ICD-10-CM

## 2022-05-23 DIAGNOSIS — R06.02 SHORTNESS OF BREATH: ICD-10-CM

## 2022-05-23 PROCEDURE — 99214 OFFICE O/P EST MOD 30 MIN: CPT | Mod: GC | Performed by: STUDENT IN AN ORGANIZED HEALTH CARE EDUCATION/TRAINING PROGRAM

## 2022-05-23 RX ORDER — ALBUTEROL SULFATE 90 UG/1
2 AEROSOL, METERED RESPIRATORY (INHALATION) EVERY 6 HOURS
Qty: 18 G | Refills: 0 | Status: SHIPPED | OUTPATIENT
Start: 2022-05-23 | End: 2022-06-16

## 2022-05-23 NOTE — NURSING NOTE
Due to patient being non-English speaking/uses sign language, an  was used for this visit. Only for face-to-face interpretation by an external agency, date and length of interpretation can be found on the scanned worksheet.     name: Jeannine mcdaniel  Agency: [employer]  Language: Angolan   Id number: [id number]  Type of interpretation: Telephone, spoken

## 2022-05-23 NOTE — PATIENT INSTRUCTIONS
Patient Education   Here is the plan from today's visit    1. Cough  - Adult ENT  Referral; Future  - General PFT Lab (Please always keep checked); Future  - Pulmonary Function Test; Future    2. Shortness of breath  - Adult ENT  Referral; Future  - General PFT Lab (Please always keep checked); Future  - Pulmonary Function Test; Future  - albuterol (PROAIR HFA/PROVENTIL HFA/VENTOLIN HFA) 108 (90 Base) MCG/ACT inhaler; Inhale 2 puffs into the lungs every 6 hours  Dispense: 18 g; Refill: 0            Please call or return to clinic if your symptoms don't go away.    Follow up plan  No follow-ups on file.    Thank you for coming to [facility] today.  Lab Testing:  **If you had lab testing today and your results are reassuring or normal they will be mailed to you or sent through [employer] within 7 days.   **If the lab tests need quick action we will call you with the results.  **If you are having labs done on a different day, please call [phone] to schedule at [facility] or [phone] for other [facility] locations. Labs do not offer walk-in appointments.  The phone number we will call with results is # [phone] (home) . If this is not the best number please call our clinic and change the number.  Medication Refills:  If you need any refills please call your pharmacy and they will contact us.   If you need to  your refill at a new pharmacy, please contact the new pharmacy directly. The new pharmacy will help you get your medications transferred faster.   Scheduling:  If you have any concerns about today's visit or wish to schedule another appointment please call our office during normal business hours [phone] (8-5:00 M-F)  If a referral was made to an [facility] specialty provider and you do not get a call from central scheduling, please refer to directions on your visit summary or call our office during normal business hours for assistance.   If a  Mammogram was ordered for you at the [facility] call [phone] to schedule or change your appointment.  If you had an XRay/CT/Ultrasound/MRI ordered the number is [phone] to schedule or change your radiology appointment.   [facility] has limited ultrasound appointments available on Wednesdays, if you would like your ultrasound at [facility], please call [phone] to schedule.   Medical Concerns:  If you have urgent medical concerns please call [phone] at any time of the day.    Bev Barrera MD

## 2022-05-23 NOTE — PROGRESS NOTES
Assessment & Plan     Cough  Shortness of breath  >1 year complaints of cough and SOB. CXR May 2021 was without pathologic findings. Previously was treated as asthma and received albuterol which he states did not improve symptoms. Took Zyrtec for several months, to evaluate if symptoms could be due to allergies, without improvement. Worked up for GERD and had EGD proven H pylori. This was treated and while reflux improved pt reports continued cough and SOB. He has continued to take omeprazole 20 mg BID most days of the week. Also reports he has noticed a globus sensation on the right anterior throat when swallowing. Pt states symptoms were absent for several months when he was back in [place], but returned after return to [place]. Exam today was notable for decreased/restricted breath sounds in bilateral lung bases, concerning for reactive airway disease. Overall history/symptoms and exam are most concerning for asthma. He has never undergone formal testing with spirometry so PFT ordered today. Referral for ENT also placed today given duration of symptoms and report of globus sensation with swallowing. Less concern for foreign body given duration of symptoms however pt may benefit from laryngoscopy for direct visualization of the area. Despite previous reports of ineffectiveness albuterol prescription provided today and pt instructed on use to attempt to improve bronchoconstriction if present.   - Adult ENT  Referral  - General PFT Lab (Please always keep checked)  - Pulmonary Function Test  - albuterol (PROAIR HFA/PROVENTIL HFA/VENTOLIN HFA) 108 (90 Base) MCG/ACT inhaler  Dispense: 18 g; Refill: 0        No follow-ups on file.    Bev Barrera MD  [facility] MARKO Guzman is a 37 year old who presents for the following health issues  accompanied by his .    HPI   Having breathing issues for the past year. Was diagnosed with H pylori which was treated and improved.  "Coughing and feels like he can't get enough air in    Was in [place] for 5 months and didn't have issues   Tried albuterol previously without improvement.    Thinks he hears a sound with breathing \"that sounds like asthma breathing\" and on the right side.     Problems is the worst in the house and when lying down or sitting.      Has previously taken omeprazole 20 mg BID as well as Zyrtec.       Review of Systems   ROS otherwise negative if not stated in HPI        Objective    /75   Pulse 80   Temp 98.4  F (36.9  C) (Oral)   Resp 16   Wt 93.9 kg (207 lb)   SpO2 98%   BMI 26.58 kg/m    Body mass index is 26.58 kg/m .  Physical Exam   GENERAL: healthy, alert and no distress  NECK: no adenopathy, no asymmetry, masses, or scars and thyroid normal to palpation  RESP: no rales , no wheezes and decreased breath sounds bibasilar  CV: regular rate and rhythm, normal S1 S2, no S3 or S4, no murmur  ABDOMEN: soft, nontender, no hepatosplenomegaly, no masses and bowel sounds normal  MS: no gross musculoskeletal defects noted, no edema  SKIN: no suspicious lesions or rashes  PSYCH: mentation appears normal, affect normal/bright    No results found for any visits on 05/23/22.            "

## 2022-05-25 DIAGNOSIS — R05.9 COUGH: ICD-10-CM

## 2022-05-25 DIAGNOSIS — R06.02 SHORTNESS OF BREATH: ICD-10-CM

## 2022-05-25 PROCEDURE — 94729 DIFFUSING CAPACITY: CPT | Performed by: INTERNAL MEDICINE

## 2022-05-25 PROCEDURE — 94726 PLETHYSMOGRAPHY LUNG VOLUMES: CPT | Performed by: INTERNAL MEDICINE

## 2022-05-25 PROCEDURE — 94060 EVALUATION OF WHEEZING: CPT | Performed by: INTERNAL MEDICINE

## 2022-05-31 LAB
DLCOUNC-%PRED-PRE: 107 %
DLCOUNC-PRE: 38.35 ML/MIN/MMHG
DLCOUNC-PRED: 35.59 ML/MIN/MMHG
ERV-%PRED-PRE: 31 %
ERV-PRE: 0.59 L
ERV-PRED: 1.88 L
EXPTIME-PRE: 7.19 SEC
FEF2575-%PRED-POST: 62 %
FEF2575-%PRED-PRE: 37 %
FEF2575-POST: 2.78 L/SEC
FEF2575-PRE: 1.66 L/SEC
FEF2575-PRED: 4.42 L/SEC
FEFMAX-%PRED-PRE: 41 %
FEFMAX-PRE: 4.65 L/SEC
FEFMAX-PRED: 11.15 L/SEC
FEV1-%PRED-PRE: 54 %
FEV1-PRE: 2.45 L
FEV1FEV6-PRE: 70 %
FEV1FEV6-PRED: 82 %
FEV1FVC-PRE: 70 %
FEV1FVC-PRED: 82 %
FEV1SVC-PRE: 67 %
FEV1SVC-PRED: 73 %
FIFMAX-PRE: 4.24 L/SEC
FRCPLETH-%PRED-PRE: 114 %
FRCPLETH-PRE: 4.16 L
FRCPLETH-PRED: 3.64 L
FVC-%PRED-PRE: 62 %
FVC-PRE: 3.52 L
FVC-PRED: 5.6 L
IC-%PRED-PRE: 70 %
IC-PRE: 3.06 L
IC-PRED: 4.34 L
RVPLETH-%PRED-PRE: 174 %
RVPLETH-PRE: 3.57 L
RVPLETH-PRED: 2.05 L
TLCPLETH-%PRED-PRE: 90 %
TLCPLETH-PRE: 7.22 L
TLCPLETH-PRED: 7.94 L
VA-%PRED-PRE: 66 %
VA-PRE: 5.07 L
VC-%PRED-PRE: 58 %
VC-PRE: 3.64 L
VC-PRED: 6.22 L

## 2022-06-08 NOTE — TELEPHONE ENCOUNTER
FUTURE VISIT INFORMATION      FUTURE VISIT INFORMATION:    Date: 7/12/22    Time: 2PM    Location[place] [facility]  REFERRAL INFORMATION:    Referring provider:  Farooq Awad MD    Referring providers clinic:  [facility]    Reason for visit/diagnosis  Per Pt, dx cough and SOB, referral from Farooq Awad    RECORDS REQUESTED FROM:       Clinic name Comments Records Status Imaging Status   [facility] 5/23/22 note and referral from Farooq Awad MD EPIC    Procedures  5/25/22 PFT   11/1/21 upper Gi Endoscopy  EPIC    Imaging 5/26/21 XR [facility] PACS

## 2022-06-16 DIAGNOSIS — R06.02 SHORTNESS OF BREATH: ICD-10-CM

## 2022-06-16 RX ORDER — ALBUTEROL SULFATE 90 UG/1
2 AEROSOL, METERED RESPIRATORY (INHALATION) EVERY 6 HOURS
Qty: 18 G | Refills: 0 | Status: SHIPPED | OUTPATIENT
Start: 2022-06-16 | End: 2024-04-04

## 2022-06-16 NOTE — TELEPHONE ENCOUNTER
[facility] phone call message- patient requesting results.    Test: {RESULT pulmonary function test result?    Date of test: 05/25/2022    Additional Comments: patient needs result would you please contact    Lab tab checked to verify if result comment present with in each order: Yes    Letters tab checked to verify if lab result letter has been entered: Yes    OK to leave a message on voice mail? Yes    Advised patient response may take up to 2 business days: No needs soon worried a lot  if possible     Primary language: [place] Island[place]      needed? Yes    Call taken on June 16, 2022 at 11:42 AM by BRITTNEY Murrell    Route to [facility]

## 2022-06-16 NOTE — TELEPHONE ENCOUNTER
Patient requesting PFT results. RN routing to ordering provider to review and interpret.    Katty Thomason RN

## 2022-06-16 NOTE — TELEPHONE ENCOUNTER
"RN spoke with patient to relay results. Patient verbalized understanding. Wanted to clarify the difference between ENT appt and PFT tests. RN scheduled patient for asthma follow up on 6/23/22.     Patient stated that he has not picked up the albuterol inhaler since last appointment as he did not have refills. RN will send refill to [facility]. Advised per instruction to use q 6 hours until follow up appointment.     Katty Thomason RN    \"PFTs indicate Thomas has asthma. He should make an asthma appointment with a provider here and use the inhaler I prescribed at his last visit until then.     Thanks,   Bev \"    "

## 2022-06-24 ENCOUNTER — VIRTUAL VISIT (OUTPATIENT)
Dept: FAMILY MEDICINE | Facility: CLINIC | Age: 37
End: 2022-06-24
Payer: COMMERCIAL

## 2022-06-24 DIAGNOSIS — J45.30 MILD PERSISTENT ASTHMA WITHOUT COMPLICATION: Primary | ICD-10-CM

## 2022-06-24 PROCEDURE — 99442 PR PHYSICIAN TELEPHONE EVALUATION 11-20 MIN: CPT | Mod: 95 | Performed by: FAMILY MEDICINE

## 2022-06-24 RX ORDER — BUDESONIDE AND FORMOTEROL FUMARATE DIHYDRATE 80; 4.5 UG/1; UG/1
AEROSOL RESPIRATORY (INHALATION)
Qty: 20.4 G | Refills: 11 | Status: SHIPPED | OUTPATIENT
Start: 2022-06-24 | End: 2024-04-04

## 2022-06-24 NOTE — PROGRESS NOTES
"Thomas is a 37 year old who is being evaluated via a billable telephone visit.      What phone number would you like to be contacted at? [phone]  How would you like to obtain your AVS? Mail a copy    Assessment & Plan     Mild persistent asthma without complication  Plan to start symbicort.   Discussed SMART therapy and recommended use.   Patient wonders if this is due to recent episode of COVID - I explained I am uncertain of this.   Answered questions to the best of my ability.     - budesonide-formoterol (SYMBICORT) 80-4.5 MCG/ACT Inhaler  Dispense: 20.4 g; Refill: 11       BMI:   Estimated body mass index is 26.58 kg/m  as calculated from the following:    Height as of 11/1/21: 1.88 m (6' 2\").    Weight as of 5/23/22: 93.9 kg (207 lb).   No follow-ups on file.    Stephenie Hassan [facility] MARKO Guzman is a 37 year old, presenting for the following health issues:  RECHECK (Asthma. No )      HPI     Patient presents for phone visit to discuss new diagnosis of asthma.   Has not taken inhalers previously.     PFTs: 5/25/22: Consistent with asthma (Moderately severe airflow obstruction with significant bronchodilator response).       Review of Systems   Constitutional, HEENT, cardiovascular, pulmonary, gi and gu systems are negative, except as otherwise noted.      Objective       Vitals:  No vitals were obtained today due to virtual visit.    Physical Exam   healthy, alert and no distress  PSYCH: Alert and oriented times 3; coherent speech, normal   rate and volume, able to articulate logical thoughts, able   to abstract reason, no tangential thoughts, no hallucinations   or delusions  His affect is normal  RESP: No cough, no audible wheezing, able to talk in full sentences  Remainder of exam unable to be completed due to telephone visits            Phone call duration: 13 minutes    .  ..  "

## 2022-07-12 ENCOUNTER — PRE VISIT (OUTPATIENT)
Dept: OTOLARYNGOLOGY | Facility: CLINIC | Age: 37
End: 2022-07-12
Payer: COMMERCIAL

## 2022-07-12 ENCOUNTER — OFFICE VISIT (OUTPATIENT)
Dept: OTOLARYNGOLOGY | Facility: CLINIC | Age: 37
End: 2022-07-12
Attending: FAMILY MEDICINE
Payer: COMMERCIAL

## 2022-07-12 VITALS
TEMPERATURE: 98.2 F | DIASTOLIC BLOOD PRESSURE: 69 MMHG | HEIGHT: 74 IN | WEIGHT: 207 LBS | OXYGEN SATURATION: 97 % | HEART RATE: 66 BPM | BODY MASS INDEX: 26.56 KG/M2 | SYSTOLIC BLOOD PRESSURE: 121 MMHG

## 2022-07-12 DIAGNOSIS — R06.02 SHORTNESS OF BREATH: Primary | ICD-10-CM

## 2022-07-12 DIAGNOSIS — R06.02 SHORTNESS OF BREATH: ICD-10-CM

## 2022-07-12 DIAGNOSIS — R05.9 COUGH: ICD-10-CM

## 2022-07-12 DIAGNOSIS — J38.3 PARADOXICAL VOCAL FOLD MOTION DISORDER: ICD-10-CM

## 2022-07-12 DIAGNOSIS — R05.9 COUGH: Primary | ICD-10-CM

## 2022-07-12 PROCEDURE — 31575 DIAGNOSTIC LARYNGOSCOPY: CPT | Performed by: OTOLARYNGOLOGY

## 2022-07-12 PROCEDURE — 99203 OFFICE O/P NEW LOW 30 MIN: CPT | Mod: 25 | Performed by: OTOLARYNGOLOGY

## 2022-07-12 PROCEDURE — 92524 BEHAVRAL QUALIT ANALYS VOICE: CPT | Mod: GN | Performed by: SPEECH-LANGUAGE PATHOLOGIST

## 2022-07-12 ASSESSMENT — PAIN SCALES - GENERAL: PAINLEVEL: NO PAIN (0)

## 2022-07-12 NOTE — LETTER
2022       RE: Thomas Sandhu  [address]  [place] [address]     Dear Colleague,    Thank you for referring your patient, Thomas Sandhu, to the [facility] [place] [facility]. Please see a copy of my visit note below.        [facility]   at the [facility]   Otolaryngology Clinic     Patient: Thomas Sandhu    MRN: [id number]    : 1985    Age/Gender: 37 year old male  Date of Service: 2022  Rendering Provider:   Mona Escalante MD     Referring Provider   PCP: Karen Agarwal  Referring Physician: Farooq Awad MD  [address]  [place] [address]  Reason for Consultation   Dyspnea  History   HISTORY OF PRESENT ILLNESS: I was asked to consult on Mr. Sandhu by Dr. Farooq Awad. Mr. Sandhu is a 37 year old male who presents to us today with dyspnea.        he presents today for evaluation. he reports:  - history of breathing difficulty and cough in the setting of COVID infection in 2020   - other symptoms resolved   - cough persisted   - evaluated by PCP in May 2021  - referred to pulmonology and ENT   - suspected was secondary to reflux  - EGD revealed H. pylori  - resolved H. pylori but cough persisted   - referred to pulmonology and ENT   - evaluated by pulmonology   - PFT significant for asthma and bronchodilated response   - inhaler helps cough   - does not take inhaler regularly   - he has never seen his vocal folds before  - no worsened pain in his neck at the end of the day   - only worsened pain in his neck when he is having difficulty breathing    Dysphonia  - denies    Dysphagia  - denies  - coughs after big meals    Dyspnea  - last episode with difficulty breathing yesterday   - while driving  - did not have to pull over   - difficulty breathing leads to cough  - wheezes with difficulty breathing     Throat clearing/cough  - no throat pain  - cough  wakes him up from sleep once a week   - denies waking up with sore throat when this happens   - cough occurs following large meal or while lying down  - cough occurs when in small spaces  - denies claustrophobia or fear of small spaces    GERD/LPRD   - history of GERD     PAST MEDICAL HISTORY: No past medical history on file.    PAST SURGICAL HISTORY:   Past Surgical History:   Procedure Laterality Date     ESOPHAGOSCOPY, GASTROSCOPY, DUODENOSCOPY (EGD), COMBINED N/A 11/1/2021    Procedure: ESOPHAGOGASTRODUODENOSCOPY, WITH BIOPSY;  Surgeon: Davy Mcguire MD;  [place] [facility]       CURRENT MEDICATIONS:   Current Outpatient Medications:      albuterol (PROAIR HFA/PROVENTIL HFA/VENTOLIN HFA) 108 (90 Base) MCG/ACT inhaler, Inhale 2 puffs into the lungs every 6 hours, Disp: 18 g, Rfl: 0     budesonide-formoterol (SYMBICORT) 80-4.5 MCG/ACT Inhaler, Inhale 2 puffs twice daily plus 1-2 puffs as needed. May use up to 12 puffs per day., Disp: 20.4 g, Rfl: 11     omeprazole (PRILOSEC) 40 MG DR capsule, Take 1 capsule (40 mg) by mouth 2 times daily (before meals), Disp: 60 capsule, Rfl: 3    ALLERGIES: Patient has no known allergies.    SOCIAL HISTORY:    Social History     Socioeconomic History     Marital status: Single     Spouse name: Not on file     Number of children: Not on file     Years of education: Not on file     Highest education level: Not on file   Occupational History     Occupation:    Tobacco Use     Smoking status: Never Smoker     Smokeless tobacco: Never Used   Substance and Sexual Activity     Alcohol use: No     Drug use: No     Sexual activity: Not on file   Other Topics Concern     Parent/sibling w/ CABG, MI or angioplasty before 65F 55M? Not Asked   Social History Narrative    12 bothers and sisters.         Moved to [place] 2019.        Lives with wife.      Social Determinants of Health     Financial Resource Strain: Not on file   Food Insecurity: Not on file   Transportation  Needs: Not on file   Physical Activity: Not on file   Stress: Not on file   Social Connections: Not on file   Intimate Partner Violence: Not on file   Housing Stability: Not on file         FAMILY HISTORY: No family history on file.  Non-contributory for problems with anesthesia    REVIEW OF SYSTEMS:   The patient was asked a 14 point review of systems regarding constitutional symptoms, eye symptoms, ears, nose, mouth, throat symptoms, cardiovascular symptoms, respiratory symptoms, gastrointestinal symptoms, genitourinary symptoms, musculoskeletal symptoms, integumentary symptoms, neurological symptoms, psychiatric symptoms, endocrine symptoms, hematologic/lymphatic symptoms, and allergic/ immunologic symptoms.   The pertinent factors have been included in the HPI and below.  Patient Supplied Answers to Review of Systems  No flowsheet data found.    Physical Examination   The patient underwent a physical examination as described below. The pertinent positive and negative findings are summarized after the description of the examination.  Constitutional: The patient's developmental and nutritional status was assessed. The patient's voice quality was assessed.  Head and Face: The head and face were inspected for deformities. The sinuses were palpated. The salivary glands were palpated. Facial muscle strength was assessed bilaterally.  Eyes: Extraocular movements and primary gaze alignment were assessed.  Ears, Nose, Mouth and Throat: The ears and nose were examined for deformities. The nasal septum, mucosa, and turbinates were inspected by anterior rhinoscopy. The lips, teeth, and gums were examined for abnormalities. The oral mucosa, tongue, palate, tonsils, lateral and posterior pharynx were inspected for the presence of asymmetry or mucosal lesions.    Neck: The tracheal position was noted, and the neck mass palpated to determine if there were any asymmetries, abnormal neck masses, thyromegally, or thyroid  nodules.  Respiratory: The nature of the breathing and chest expansion/symmetry was observed.  Cardiovascular: The patient was examined to determine the presence of any edema or jugular venous distension.  Abdomen: The contour of the abdomen was noted.  Lymphatic: The patient was examined for infraclavicular lymphadenopathy.  Musculoskeletal: The patient was inspected for the presence of skeletal deformities.  Extremities: The extremities were examined for any clubbing or cyanosis.  Skin: The skin was examined for inflammatory or neoplastic conditions.  Neurologic: The patient's orientation, mood, and affect were noted. The cranial nerve  functions were examined.  Other pertinent positive and negative findings on physical examination:      OC/OP: no lesions, uvula midline, soft palate elevates symmetrically   Neck: no lesions, no TH tenderness to palpation, bilateral SCM pain    All other physical examination findings were within normal limits and noncontributory.  Procedures   Flexible laryngoscopy (CPT 63537)      Pre-procedure diagnosis: dyspnea  Post-procedure diagnosis: same as above  Indication for procedure: Mr. Sandhu is a 37 year old male with see above  Procedure(s): Fiberoptic Laryngoscopy    Details of Procedure: After informed consent was obtained, the patient was seated in the examination chair.  The areas of the nasopharynx as well as the hypopharynx were anesthetized with topical 4% lidocaine with 0.25% phenylephrine atomizer.  Examination of the base of tongue was performed first.  Attention was directed to any evidence of masses in the area or evidence of leukoplakia or candidal infection.  Attention was directed to the epiglottis where its size and position was determined and its movement on phonation of the vowel  e .  The piriform sinuses were then inspected for any mass lesions or pooling of secretions.  Attention was then directed to the larynx. The vocal folds were inspected for infection or  any areas of leukoplakia, for masses, polypoid degeneration, or hemorrhage.  Having done this, the arytenoids and vocal processes were inspected for erythema or evidence of granuloma formation.  The posterior commissure was then inspected for evidence of inflammatory changes in the mucosa and heaping up of mucosal tissue. The patient was then instructed to say the vowel  e .  Adduction of vocal folds to the midline was observed for any evidence of paresis or paralysis of the larynx or asymmetry in rotation of the larynx to the left or right. The patient was asked to breathe and the degree of abduction was noted bilaterally.  Subglottic view of the larynx was obtained for any additional mass lesions or mucosal changes.  Finally the post cricoid was examined for evidence of pooling of secretions, as well as the pharyngeal wall mucosa.   Anesthesia type: 0.25% phenylephrine    Findings:  Anatomic/physiological deviations: LNC, mild pronounced vasculature of vocal folds, patent subglottis, can mimic inspiratory stridor   Right vocal process: No restriction of mobility   Left vocal process: No restriction of mobility  Glottal gap: Complete glottal closure  Supraglottic structures: Normal  Hypopharynx: Normal     Estimated Blood Loss: minimal  Complications: None  Disposition: Patient tolerated the procedure well            Review of Relevant Clinical Data   I personally reviewed:    Melissayenni Bev 5/23/22   >1 year complaints of cough and SOB. CXR May 2021 was without pathologic findings. Previously was treated as asthma and received albuterol which he states did not improve symptoms. Took Zyrtec for several months, to evaluate if symptoms could be due to allergies, without improvement. Worked up for GERD and had EGD proven H pylori. This was treated and while reflux improved pt reports continued cough and SOB. He has continued to take omeprazole 20 mg BID most days of the week. Also reports he has noticed a globus  sensation on the right anterior throat when swallowing. Pt states symptoms were absent for several months when he was back in [place], but returned after [place] to [place]. Exam today was notable for decreased/restricted breath sounds in bilateral lung bases, concerning for reactive airway disease. Overall history/symptoms and exam are most concerning for asthma. He has never undergone formal testing with spirometry so PFT ordered today. Referral for ENT also placed today given duration of symptoms and report of globus sensation with swallowing. Less concern for foreign body given duration of symptoms however pt may benefit from laryngoscopy for direct visualization of the area. Despite previous reports of ineffectiveness albuterol prescription provided today and pt instructed on use to attempt to improve bronchoconstriction if present.       Stephenie Hassan 6/24/22  Mild persistent asthma without complication  Plan to start symbicort.   Discussed SMART therapy and recommended use.   Patient wonders if this is due to recent episode of COVID - I explained I am uncertain of this.   Answered questions to the best of my ability.      - budesonide-formoterol (SYMBICORT) 80-4.5 MCG/ACT Inhaler  Dispense: 20.4 g; Refill: 11    PFTs 5/25/22  IMPRESSION:     Moderately severe airflow obstruction with a significant bronchodilator response.     There is air trapping without hyperinflation.     Normal diffusing capacity.       11/1/21   Impression:          - Normal esophagus.                        - Z-line regular, 40 cm from the incisors.                        - Normal stomach. Biopsied.                        - Normal examined duodenum. Biopsied    Radiology: XR Chest (5/26/21)  IMPRESSION: Negative   Pathology: Surgical pathology 11/1/21 (duodenal biopsy, gastric biopsy)  Final Diagnosis  A(1). Duodenal biopsy:  -Duodenal mucosa with no significant histopathologic abnormality; features of celiac sprue are absent  B(2). Gastric  biopsy:  -Gastric oxyntic mucosa with no significant histopathologic abnormality  -Negative for H. Pylori on immunohistochemical staining  -Negative for intestinal metaplasia and dysplasia  Procedures: EGD with biopsy (21)  Labs:  Lab Results   Component Value Date    TSH 3.95 2021     Lab Results   Component Value Date     2021    CO2 27 2021    BUN 6 (L) 2021     Lab Results   Component Value Date    WBC 4.1 2021    HGB 15.0 2021    HCT 47.6 2021    MCV 89 2021     2021     No results found for: PT, PTT, INR  No results found for: ANTOLIN  No components found for: RHEUMATOIDFACTOR,  RF  No results found for: CRP  No components found for: CKTOT, URICACID  No components found for: C3, C4, DSDNAAB, NDNAABIFA  No results found for: MPOAB    Patient reported Quality of Life (QOL) Measures   Patient Supplied Answers To VHI Questionnaire  No flowsheet data found.      Patient Supplied Answers To EAT Questionnaire  No flowsheet data found.      Patient Supplied Answers To CSI Questionnaire  No flowsheet data found.         Impression & Plan     IMPRESSION: Mr. Sandhu is a 37 year old male who is being seen for the followin. Dyspnea   - history of COVID-19 infection in   - he has difficulty breathing that causes cough  - history of PFT significant for asthma and bronchodilated response   - scope today shows patent subglottis, does show paradoxical vocal fold motion that he can mimic  - hard for him to differentiate between expiratory wheeze vs inspiratory stridor however after much discussion he does think it is inspiratory stridor he is feeling  Plan  - needs to take steroid inhaler daily - will reach out to pulm team  - breathing therapy     RETURN VISIT: as needed after therapy     Thank you for the kind referral and for allowing me to share in the care of Mr. Sandhu. If you have any questions, please do not hesitate to contact me.    Mona  Alisha Escalante MD    Laryngology    [facility]  Department of  Otolaryngology - Head and Neck Surgery  [facility] [place] [address]  Appointment line: [phone]  Fax: [phone]  [email or web address]

## 2022-07-12 NOTE — PROGRESS NOTES
[facility]   at the [facility]   Otolaryngology Clinic     Patient: Thomas Sandhu    MRN: [id number]    : 1985    Age/Gender: 37 year old male  Date of Service: 2022  Rendering Provider:   Mona Escalante MD     Referring Provider   PCP: Karen Agarwal  Referring Physician: Farooq Awad MD  [address] [address]  Reason for Consultation   Dyspnea  History   HISTORY OF PRESENT ILLNESS: I was asked to consult on Mr. Sandhu by Dr. Farooq Awad. Mr. Sandhu is a 37 year old male who presents to us today with dyspnea.        he presents today for evaluation. he reports:  - history of breathing difficulty and cough in the setting of COVID infection in 2020   - other symptoms resolved   - cough persisted   - evaluated by PCP in May 2021  - referred to pulmonology and ENT   - suspected was secondary to reflux  - EGD revealed H. pylori  - resolved H. pylori but cough persisted   - referred to pulmonology and ENT   - evaluated by pulmonology   - PFT significant for asthma and bronchodilated response   - inhaler helps cough   - does not take inhaler regularly   - he has never seen his vocal folds before  - no worsened pain in his neck at the end of the day   - only worsened pain in his neck when he is having difficulty breathing    Dysphonia  - denies    Dysphagia  - denies  - coughs after big meals    Dyspnea  - last episode with difficulty breathing yesterday   - while driving  - did not have to pull over   - difficulty breathing leads to cough  - wheezes with difficulty breathing     Throat clearing/cough  - no throat pain  - cough wakes him up from sleep once a week   - denies waking up with sore throat when this happens   - cough occurs following large meal or while lying down  - cough occurs when in small spaces  - denies claustrophobia or fear of small spaces    GERD/LPRD   - history of GERD     PAST MEDICAL HISTORY: No past medical history on  file.    PAST SURGICAL HISTORY:   Past Surgical History:   Procedure Laterality Date     ESOPHAGOSCOPY, GASTROSCOPY, DUODENOSCOPY (EGD), COMBINED N/A 11/1/2021    Procedure: ESOPHAGOGASTRODUODENOSCOPY, WITH BIOPSY;  Surgeon: Davy Mcguire MD;  [place] [facility] [place]       CURRENT MEDICATIONS:   Current Outpatient Medications:      albuterol (PROAIR HFA/PROVENTIL HFA/VENTOLIN HFA) 108 (90 Base) MCG/ACT inhaler, Inhale 2 puffs into the lungs every 6 hours, Disp: 18 g, Rfl: 0     budesonide-formoterol (SYMBICORT) 80-4.5 MCG/ACT Inhaler, Inhale 2 puffs twice daily plus 1-2 puffs as needed. May use up to 12 puffs per day., Disp: 20.4 g, Rfl: 11     omeprazole (PRILOSEC) 40 MG DR capsule, Take 1 capsule (40 mg) by mouth 2 times daily (before meals), Disp: 60 capsule, Rfl: 3    ALLERGIES: Patient has no known allergies.    SOCIAL HISTORY:    Social History     Socioeconomic History     Marital status: Single     Spouse name: Not on file     Number of children: Not on file     Years of education: Not on file     Highest education level: Not on file   Occupational History     Occupation:    Tobacco Use     Smoking status: Never Smoker     Smokeless tobacco: Never Used   Substance and Sexual Activity     Alcohol use: No     Drug use: No     Sexual activity: Not on file   Other Topics Concern     Parent/sibling w/ CABG, MI or angioplasty before 65F 55M? Not Asked   Social History Narrative    12 bothers and sisters.         Moved to [place] 2019.        Lives with wife.      Social Determinants of Health     Financial Resource Strain: Not on file   Food Insecurity: Not on file   Transportation Needs: Not on file   Physical Activity: Not on file   Stress: Not on file   Social Connections: Not on file   Intimate Partner Violence: Not on file   Housing Stability: Not on file         FAMILY HISTORY: No family history on file.  Non-contributory for problems with anesthesia    REVIEW OF SYSTEMS:   The patient was  asked a 14 point review of systems regarding constitutional symptoms, eye symptoms, ears, nose, mouth, throat symptoms, cardiovascular symptoms, respiratory symptoms, gastrointestinal symptoms, genitourinary symptoms, musculoskeletal symptoms, integumentary symptoms, neurological symptoms, psychiatric symptoms, endocrine symptoms, hematologic/lymphatic symptoms, and allergic/ immunologic symptoms.   The pertinent factors have been included in the HPI and below.  Patient Supplied Answers to Review of Systems  No flowsheet data found.    Physical Examination   The patient underwent a physical examination as described below. The pertinent positive and negative findings are summarized after the description of the examination.  Constitutional: The patient's developmental and nutritional status was assessed. The patient's voice quality was assessed.  Head and Face: The head and face were inspected for deformities. The sinuses were palpated. The salivary glands were palpated. Facial muscle strength was assessed bilaterally.  Eyes: Extraocular movements and primary gaze alignment were assessed.  Ears, Nose, Mouth and Throat: The ears and nose were examined for deformities. The nasal septum, mucosa, and turbinates were inspected by anterior rhinoscopy. The lips, teeth, and gums were examined for abnormalities. The oral mucosa, tongue, palate, tonsils, lateral and posterior pharynx were inspected for the presence of asymmetry or mucosal lesions.    Neck: The tracheal position was noted, and the neck mass palpated to determine if there were any asymmetries, abnormal neck masses, thyromegally, or thyroid nodules.  Respiratory: The nature of the breathing and chest expansion/symmetry was observed.  Cardiovascular: The patient was examined to determine the presence of any edema or jugular venous distension.  Abdomen: The contour of the abdomen was noted.  Lymphatic: The patient was examined for infraclavicular  lymphadenopathy.  Musculoskeletal: The patient was inspected for the presence of skeletal deformities.  Extremities: The extremities were examined for any clubbing or cyanosis.  Skin: The skin was examined for inflammatory or neoplastic conditions.  Neurologic: The patient's orientation, mood, and affect were noted. The cranial nerve  functions were examined.  Other pertinent positive and negative findings on physical examination:      OC/OP: no lesions, uvula midline, soft palate elevates symmetrically   Neck: no lesions, no TH tenderness to palpation, bilateral SCM pain    All other physical examination findings were within normal limits and noncontributory.  Procedures   Flexible laryngoscopy (CPT 17894)      Pre-procedure diagnosis: dyspnea  Post-procedure diagnosis: same as above  Indication for procedure: Mr. Sandhu is a 37 year old male with see above  Procedure(s): Fiberoptic Laryngoscopy    Details of Procedure: After informed consent was obtained, the patient was seated in the examination chair.  The areas of the nasopharynx as well as the hypopharynx were anesthetized with topical 4% lidocaine with 0.25% phenylephrine atomizer.  Examination of the base of tongue was performed first.  Attention was directed to any evidence of masses in the area or evidence of leukoplakia or candidal infection.  Attention was directed to the epiglottis where its size and position was determined and its movement on phonation of the vowel  e .  The piriform sinuses were then inspected for any mass lesions or pooling of secretions.  Attention was then directed to the larynx. The vocal folds were inspected for infection or any areas of leukoplakia, for masses, polypoid degeneration, or hemorrhage.  Having done this, the arytenoids and vocal processes were inspected for erythema or evidence of granuloma formation.  The posterior commissure was then inspected for evidence of inflammatory changes in the mucosa and heaping up of  mucosal tissue. The patient was then instructed to say the vowel  e .  Adduction of vocal folds to the midline was observed for any evidence of paresis or paralysis of the larynx or asymmetry in rotation of the larynx to the left or right. The patient was asked to breathe and the degree of abduction was noted bilaterally.  Subglottic view of the larynx was obtained for any additional mass lesions or mucosal changes.  Finally the post cricoid was examined for evidence of pooling of secretions, as well as the pharyngeal wall mucosa.   Anesthesia type: 0.25% phenylephrine    Findings:  Anatomic/physiological deviations: LNC, mild pronounced vasculature of vocal folds, patent subglottis, can mimic inspiratory stridor   Right vocal process: No restriction of mobility   Left vocal process: No restriction of mobility  Glottal gap: Complete glottal closure  Supraglottic structures: Normal  Hypopharynx: Normal     Estimated Blood Loss: minimal  Complications: None  Disposition: Patient tolerated the procedure well            Review of Relevant Clinical Data   I personally reviewed:    Bev Barrera 5/23/22   >1 year complaints of cough and SOB. CXR May 2021 was without pathologic findings. Previously was treated as asthma and received albuterol which he states did not improve symptoms. Took Zyrtec for several months, to evaluate if symptoms could be due to allergies, without improvement. Worked up for GERD and had EGD proven H pylori. This was treated and while reflux improved pt reports continued cough and SOB. He has continued to take omeprazole 20 mg BID most days of the week. Also reports he has noticed a globus sensation on the right anterior throat when swallowing. Pt states symptoms were absent for several months when he was back in [place], but returned after return to [place]. Exam today was notable for decreased/restricted breath sounds in bilateral lung bases, concerning for reactive airway disease. Overall  history/symptoms and exam are most concerning for asthma. He has never undergone formal testing with spirometry so PFT ordered today. Referral for ENT also placed today given duration of symptoms and report of globus sensation with swallowing. Less concern for foreign body given duration of symptoms however pt may benefit from laryngoscopy for direct visualization of the area. Despite previous reports of ineffectiveness albuterol prescription provided today and pt instructed on use to attempt to improve bronchoconstriction if present.       Stephenie Hassan 6/24/22  Mild persistent asthma without complication  Plan to start symbicort.   Discussed SMART therapy and recommended use.   Patient wonders if this is due to recent episode of COVID - I explained I am uncertain of this.   Answered questions to the best of my ability.      - budesonide-formoterol (SYMBICORT) 80-4.5 MCG/ACT Inhaler  Dispense: 20.4 g; Refill: 11    PFTs 5/25/22  IMPRESSION:     Moderately severe airflow obstruction with a significant bronchodilator response.     There is air trapping without hyperinflation.     Normal diffusing capacity.       11/1/21   Impression:          - Normal esophagus.                        - Z-line regular, 40 cm from the incisors.                        - Normal stomach. Biopsied.                        - Normal examined duodenum. Biopsied    Radiology: XR Chest (5/26/21)  IMPRESSION: Negative   Pathology: Surgical pathology 11/1/21 (duodenal biopsy, gastric biopsy)  Final Diagnosis  A(1). Duodenal biopsy:  -Duodenal mucosa with no significant histopathologic abnormality; features of celiac sprue are absent  B(2). Gastric biopsy:  -Gastric oxyntic mucosa with no significant histopathologic abnormality  -Negative for H. Pylori on immunohistochemical staining  -Negative for intestinal metaplasia and dysplasia  Procedures: EGD with biopsy (11/1/21)  Labs:  Lab Results   Component Value Date    TSH 3.95 04/29/2021     Lab  Results   Component Value Date     2021    CO2 27 2021    BUN 6 (L) 2021     Lab Results   Component Value Date    WBC 4.1 2021    HGB 15.0 2021    HCT 47.6 2021    MCV 89 2021     2021     No results found for: PT, PTT, INR  No results found for: ANTOLIN  No components found for: RHEUMATOIDFACTOR,  RF  No results found for: CRP  No components found for: CKTOT, URICACID  No components found for: C3, C4, DSDNAAB, NDNAABIFA  No results found for: MPOAB    Patient reported Quality of Life (QOL) Measures   Patient Supplied Answers To VHI Questionnaire  No flowsheet data found.      Patient Supplied Answers To EAT Questionnaire  No flowsheet data found.      Patient Supplied Answers To CSI Questionnaire  No flowsheet data found.         Impression & Plan     IMPRESSION: Mr. Sandhu is a 37 year old male who is being seen for the followin. Dyspnea   - history of COVID-19 infection in   - he has difficulty breathing that causes cough  - history of PFT significant for asthma and bronchodilated response   - scope today shows patent subglottis, does show paradoxical vocal fold motion that he can mimic  - hard for him to differentiate between expiratory wheeze vs inspiratory stridor however after much discussion he does think it is inspiratory stridor he is feeling  Plan  - needs to take steroid inhaler daily - will reach out to pulm team  - breathing therapy     RETURN VISIT: as needed after therapy     Thank you for the kind referral and for allowing me to share in the care of Mr. Sandhu. If you have any questions, please do not hesitate to contact me.    Mona Escalante MD    Laryngology    [facility]  Department of  Otolaryngology - Head and Neck Surgery  Clinics & Surgery Center  [address] [place] [id number]  Appointment line: [phone]  Fax:  [phone]  [email or web address]

## 2022-07-12 NOTE — PROGRESS NOTES
[facility] VOICE CLINIC  CLINICAL EVALUATION REPORT    Patient: Thomas Sandhu  Date of Service: 7/12/2022  Seen in conjunction with: Dr. Mona Escalante  Referring physician: Dr. Awad    HISTORY  PATIENT INFORMATION  Thomas Sandhu is a 37 year old male presenting today for initial evaluation of voice and resonance in the context of a chronic cough. Salient details of his symptom history are as follows:    The patient was seen with an in-person, [place]-approved [facility] .    The patient reports a history of breathing difficulties and associated coughing that began in the context of COVID-19 infection in January 2021.  Symptoms have been improving slightly over time.  The patient was evaluated by his primary care physician for ongoing cough despite resolution of other COVID symptoms in May 2021.  At that time, reflux symptoms were associated with coughing, and the patient was referred to gastroenterology.  Symptoms improved but did not fully resolve with PPIs.  Patient underwent an EGD, which showed H. pylori infection.  This was treated, and cough was improved but not fully resolved.  The patient was subsequently referred to pulmonary medicine, most recently in May 2022.  Pulmonary function testing in May 2022's showed moderately severe asthma, with strong response to bronchodilators.  The patient has since begun taking albuterol, and inconsistently takes Symbicort.  He feels that symptoms are slightly better, and his most recent breathing episode was approximately 1 week ago.    Breathing episodes are characterized by difficulties breathing in with an associated sensation of tightness in the patient's chest.  When I demonstrated inspiratory stridor and expiratory wheezing, the patient endorsed inspiratory stridor.  However, several minutes later, when I asked him to redemonstrates the noisy breathing that occurs, he demonstrated expiratory wheezing.  He stated that this then typically leads to coughing.   Albuterol inhaler sometimes helps with this, and when it does help it helps within 10 minutes.  Symptoms occur if he has a very large meal, if he is laying in a supine position, or if he is in a closed, confined space.  He denies claustrophobia, and is not sure why small spaces seem to exacerbate his symptoms.  He denies increased difficulties breathing with exposure to strong smells, humid air, talking, and exercise.    The patient denies voice and swallowing concerns.  He denies globus sensation.    OBJECTIVE FINDINGS  PATIENT REPORTED MEASURES  Patient Supplied Answers To [facility] Intake Voice Questionnaire  No flowsheet data found.     Patient Supplied Answers To VHI Questionnaire  No flowsheet data found.     Patient Supplied Answers To CSI Questionnaire  No flowsheet data found.     Patient Supplied Answers To EAT Questionnaire  No flowsheet data found.        Self-Ratings as discussed with patient:  No flowsheet data found.     PERCEPTUAL EVALUATION (65784)  VOICE/ SPEECH/ NON-COMMUNICATIVE LARYNGEAL BEHAVIORS EVALUATION    Palpation of the laryngeal area shows:    firm musculature    Mildly sore SCM's.  No significant discomfort or tightness in thyrohyoid space.  Denies urge to cough with thyrohyoid palpation.    Breathing pattern:     appears within normal limits and adequate    Cough/ Throat clear:    not observed today     [facility] states today is a typical voice day, with clinician observing voice quality characterized by:    Roughness: WNL    Breathiness: WNL    Strain: WNL    Habitual pitch is perceptually within normal limits and appropriate for age and gender    Loudness is WNL and is appropriate for the setting    GRADE, ROUGHNESS, BREATHINESS, ASTHENIA, STRAIN  (GRBAS) scale:  G(0)R(0)B(0)A(0)S(1)    LARYNGEAL EXAMINATION  Procedure: Flexible endoscopy with chip-tip technology without stroboscopy, left nostril; topical anesthesia with 3% Lidocaine and 0.25% phenylephrine was applied.   Performed by:   Mona Escalante  Verbal consent was obtained and witnessed prior to this procedure.   A time-out was performed, verifying patient, procedure, and site.   This exam shows:    Velar Function: Not assessed    Secretions: Within normal limits    Laryngeal Mucosa: essentially healthy laryngeal mucosa    Vocal fold mucosa:    o RTVF - within normal limits, no visible lesions  o LTVF - within normal limits, no visible lesions    Vocal fold function:   o Vocal folds are mobile and meet at midline  o Movement is brisk and symmetric    Airway is widely patent with no obstructive defect observed    Elongation of the vocal folds for pitch increase is normal    Moderate anterior-posterior constriction of the supraglottic larynx during connected speech     ASSESSMENT / PLAN  IMPRESSIONS: Thomas Sandhu is a 37 year old male with dyspnea and chronic cough  Laryngeal exam demonstrates grossly normal structure and function.  While some of the patient's symptoms are consistent with paradoxical vocal fold motion disorder, also known as vocal cord dysfunction (greater difficulty with inhalation and exhalation), the majority of patient's symptoms are consistent with asthma.  In light of positive pulmonary function testing and positive bronchodilator response, as well as reduced compliance with medication recommendations, it is possible that this patient's asthma treatment may not currently be optimized.  It is recommended that he continue with recommended medications from pulmonary medicine, and if symptoms remain despite compliance, he will return for a brief course of respiratory retraining therapy for possible paradoxical vocal fold motion disorder.  This is currently scheduled for fall 2022.  She will cancel if symptoms resolve prior to that point..    A course of speech therapy is recommended to optimize laryngeal and respiratory mechanics.    He demonstrates a Guarded prognosis for improvement given adherence to therapeutic  recommendations.     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  -- demonstrate relaxed throat breathing and laryngeal release in order to reduce upper airway constriction with 90% accy and min cues.    Long-term goal(s): In 3 months, Faratt will:  -- report a week of typical activities, in which shortness of breath does not exceed a level  of  3 out of 10, 80% of the time.    This treatment plan was developed with the patient who agreed with the recommendations.    TOTAL SERVICE TIME: 60 minutes  EVALUATION OF VOICE AND RESONANCE (43529)  NO CHARGE FACILITY FEE (94276)    Speech recognition software may have been used in this documentation; input is reviewed before signature to the best of my ability.     Rommel Vinson, Ph.D., [facility]SLP  Speech-Language Pathologist-[facility]  [phone]  he/him/his

## 2022-07-12 NOTE — LETTER
7/12/2022       RE: Thomas Sandhu  [address]  [place] [address]     Dear Colleague,    Thank you for referring your patient, Thomas Sandhu, to the [facility]. Please see a copy of my visit note below.    [facility] VOICE [facility]  CLINICAL EVALUATION REPORT    Patient: Thomas Sandhu  Date of Service: 7/12/2022  Seen in conjunction with: Dr. Mona Escalante  Referring physician: Dr. Awad    HISTORY  PATIENT INFORMATION  Thomas Sandhu is a 37 year old male presenting today for initial evaluation of voice and resonance in the context of a chronic cough. Salient details of his symptom history are as follows:    The patient was seen with an in-person, [place]-approved [facility] .    The patient reports a history of breathing difficulties and associated coughing that began in the context of COVID-19 infection in January 2021.  Symptoms have been improving slightly over time.  The patient was evaluated by his primary care physician for ongoing cough despite resolution of other COVID symptoms in May 2021.  At that time, reflux symptoms were associated with coughing, and the patient was referred to gastroenterology.  Symptoms improved but did not fully resolve with PPIs.  Patient underwent an EGD, which showed H. pylori infection.  This was treated, and cough was improved but not fully resolved.  The patient was subsequently referred to pulmonary medicine, most recently in May 2022.  Pulmonary function testing in May 2022's showed moderately severe asthma, with strong response to bronchodilators.  The patient has since begun taking albuterol, and inconsistently takes Symbicort.  He feels that symptoms are slightly better, and his most recent breathing episode was approximately 1 week ago.    Breathing episodes are characterized by difficulties breathing in with an associated sensation of tightness in the patient's  chest.  When I demonstrated inspiratory stridor and expiratory wheezing, the patient endorsed inspiratory stridor.  However, several minutes later, when I asked him to redemonstrates the noisy breathing that occurs, he demonstrated expiratory wheezing.  He stated that this then typically leads to coughing.  Albuterol inhaler sometimes helps with this, and when it does help it helps within 10 minutes.  Symptoms occur if he has a very large meal, if he is laying in a supine position, or if he is in a closed, confined space.  He denies claustrophobia, and is not sure why small spaces seem to exacerbate his symptoms.  He denies increased difficulties breathing with exposure to strong smells, humid air, talking, and exercise.    The patient denies voice and swallowing concerns.  He denies globus sensation.    OBJECTIVE FINDINGS  PATIENT REPORTED MEASURES  Patient Supplied Answers To [facility] Intake Voice Questionnaire  No flowsheet data found.     Patient Supplied Answers To VHI Questionnaire  No flowsheet data found.     Patient Supplied Answers To CSI Questionnaire  No flowsheet data found.     Patient Supplied Answers To EAT Questionnaire  No flowsheet data found.        Self-Ratings as discussed with patient:  No flowsheet data found.     PERCEPTUAL EVALUATION (65053)  VOICE/ SPEECH/ NON-COMMUNICATIVE LARYNGEAL BEHAVIORS EVALUATION    Palpation of the laryngeal area shows:    firm musculature    Mildly sore SCM's.  No significant discomfort or tightness in thyrohyoid space.  Denies urge to cough with thyrohyoid palpation.    Breathing pattern:     appears within normal limits and adequate    Cough/ Throat clear:    not observed today     [facility] states today is a typical voice day, with clinician observing voice quality characterized by:    Roughness: WNL    Breathiness: WNL    Strain: WNL    Habitual pitch is perceptually within normal limits and appropriate for age and gender    Loudness is WNL and is appropriate for  the setting    GRADE, ROUGHNESS, BREATHINESS, ASTHENIA, STRAIN  (GRBAS) scale:  G(0)R(0)B(0)A(0)S(1)    LARYNGEAL EXAMINATION  Procedure: Flexible endoscopy with chip-tip technology without stroboscopy, left nostril; topical anesthesia with 3% Lidocaine and 0.25% phenylephrine was applied.   Performed by: Dr. Mona Escalante  Verbal consent was obtained and witnessed prior to this procedure.   A time-out was performed, verifying patient, procedure, and site.   This exam shows:    Velar Function: Not assessed    Secretions: Within normal limits    Laryngeal Mucosa: essentially healthy laryngeal mucosa    Vocal fold mucosa:    o RTVF - within normal limits, no visible lesions  o LTVF - within normal limits, no visible lesions    Vocal fold function:   o Vocal folds are mobile and meet at midline  o Movement is brisk and symmetric    Airway is widely patent with no obstructive defect observed    Elongation of the vocal folds for pitch increase is normal    Moderate anterior-posterior constriction of the supraglottic larynx during connected speech     ASSESSMENT / PLAN  IMPRESSIONS: Thomas Sandhu is a 37 year old male with dyspnea and chronic cough  Laryngeal exam demonstrates grossly normal structure and function.  While some of the patient's symptoms are consistent with paradoxical vocal fold motion disorder, also known as vocal cord dysfunction (greater difficulty with inhalation and exhalation), the majority of patient's symptoms are consistent with asthma.  In light of positive pulmonary function testing and positive bronchodilator response, as well as reduced compliance with medication recommendations, it is possible that this patient's asthma treatment may not currently be optimized.  It is recommended that he continue with recommended medications from pulmonary medicine, and if symptoms remain despite compliance, he will return for a brief course of respiratory retraining therapy for possible paradoxical vocal fold  motion disorder.  This is currently scheduled for fall 2022.  She will cancel if symptoms resolve prior to that point..    A course of speech therapy is recommended to optimize laryngeal and respiratory mechanics.    He demonstrates a Guarded prognosis for improvement given adherence to therapeutic recommendations.     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  -- demonstrate relaxed throat breathing and laryngeal release in order to reduce upper airway constriction with 90% accy and min cues.    Long-term goal(s): In 3 months, Thomas will:  -- report a week of typical activities, in which shortness of breath does not exceed a level  of  3 out of 10, 80% of the time.    This treatment plan was developed with the patient who agreed with the recommendations.    TOTAL SERVICE TIME: 60 minutes  EVALUATION OF VOICE AND RESONANCE (09303)  NO CHARGE FACILITY FEE (37642)    Speech recognition software may have been used in this documentation; input is reviewed before signature to the best of my ability.     Rommel Vinson, Ph.D., [facility]-SLP  Speech-Language Pathologist-[facility]  [phone]  he/him/his          Again, thank you for allowing me to participate in the care of your patient.      Sincerely,    Rommel Vinson, SLP

## 2022-07-12 NOTE — NURSING NOTE
"Chief Complaint   Patient presents with     Consult     Cough and shortness of breath    Blood pressure 121/69, pulse 66, temperature 98.2  F (36.8  C), temperature source Temporal, height 1.88 m (6' 2\"), weight 93.9 kg (207 lb), SpO2 97 %. Heather Mancera, EMT  "

## 2022-07-12 NOTE — LETTER
7/12/2022      RE: Thomas Sandhu  [address]  [place] [address]       [facility] VOICE [facility]  CLINICAL EVALUATION REPORT    Patient: Thomas Sandhu  Date of Service: 7/12/2022  Seen in conjunction with: Dr. Mona Escalante  Referring physician: Dr. Awad    HISTORY  PATIENT INFORMATION  Thomas Sandhu is a 37 year old male presenting today for initial evaluation of voice and resonance in the context of a chronic cough. Salient details of his symptom history are as follows:    The patient was seen with an in-person, [place]-approved [facility] .    The patient reports a history of breathing difficulties and associated coughing that began in the context of COVID-19 infection in January 2021.  Symptoms have been improving slightly over time.  The patient was evaluated by his primary care physician for ongoing cough despite resolution of other COVID symptoms in May 2021.  At that time, reflux symptoms were associated with coughing, and the patient was referred to gastroenterology.  Symptoms improved but did not fully resolve with PPIs.  Patient underwent an EGD, which showed H. pylori infection.  This was treated, and cough was improved but not fully resolved.  The patient was subsequently referred to pulmonary medicine, most recently in May 2022.  Pulmonary function testing in May 2022's showed moderately severe asthma, with strong response to bronchodilators.  The patient has since begun taking albuterol, and inconsistently takes Symbicort.  He feels that symptoms are slightly better, and his most recent breathing episode was approximately 1 week ago.    Breathing episodes are characterized by difficulties breathing in with an associated sensation of tightness in the patient's chest.  When I demonstrated inspiratory stridor and expiratory wheezing, the patient endorsed inspiratory stridor.  However, several minutes later, when I asked him to redemonstrates the noisy breathing that occurs, he  demonstrated expiratory wheezing.  He stated that this then typically leads to coughing.  Albuterol inhaler sometimes helps with this, and when it does help it helps within 10 minutes.  Symptoms occur if he has a very large meal, if he is laying in a supine position, or if he is in a closed, confined space.  He denies claustrophobia, and is not sure why small spaces seem to exacerbate his symptoms.  He denies increased difficulties breathing with exposure to strong smells, humid air, talking, and exercise.    The patient denies voice and swallowing concerns.  He denies globus sensation.    OBJECTIVE FINDINGS  PATIENT REPORTED MEASURES  Patient Supplied Answers To [facility] Intake Voice Questionnaire  No flowsheet data found.     Patient Supplied Answers To VHI Questionnaire  No flowsheet data found.     Patient Supplied Answers To CSI Questionnaire  No flowsheet data found.     Patient Supplied Answers To EAT Questionnaire  No flowsheet data found.        Self-Ratings as discussed with patient:  No flowsheet data found.     PERCEPTUAL EVALUATION (74168)  VOICE/ SPEECH/ NON-COMMUNICATIVE LARYNGEAL BEHAVIORS EVALUATION    Palpation of the laryngeal area shows:    firm musculature    Mildly sore SCM's.  No significant discomfort or tightness in thyrohyoid space.  Denies urge to cough with thyrohyoid palpation.    Breathing pattern:     appears within normal limits and adequate    Cough/ Throat clear:    not observed today     [facility] states today is a typical voice day, with clinician observing voice quality characterized by:    Roughness: WNL    Breathiness: WNL    Strain: WNL    Habitual pitch is perceptually within normal limits and appropriate for age and gender    Loudness is WNL and is appropriate for the setting    GRADE, ROUGHNESS, BREATHINESS, ASTHENIA, STRAIN  (GRBAS) scale:  G(0)R(0)B(0)A(0)S(1)    LARYNGEAL EXAMINATION  Procedure: Flexible endoscopy with chip-tip technology without stroboscopy, left nostril;  topical anesthesia with 3% Lidocaine and 0.25% phenylephrine was applied.   Performed by: Dr. Mona Escalante  Verbal consent was obtained and witnessed prior to this procedure.   A time-out was performed, verifying patient, procedure, and site.   This exam shows:    Velar Function: Not assessed    Secretions: Within normal limits    Laryngeal Mucosa: essentially healthy laryngeal mucosa    Vocal fold mucosa:    o RTVF - within normal limits, no visible lesions  o LTVF - within normal limits, no visible lesions    Vocal fold function:   o Vocal folds are mobile and meet at midline  o Movement is brisk and symmetric    Airway is widely patent with no obstructive defect observed    Elongation of the vocal folds for pitch increase is normal    Moderate anterior-posterior constriction of the supraglottic larynx during connected speech     ASSESSMENT / PLAN  IMPRESSIONS: Thomas Sandhu is a 37 year old male with dyspnea and chronic cough  Laryngeal exam demonstrates grossly normal structure and function.  While some of the patient's symptoms are consistent with paradoxical vocal fold motion disorder, also known as vocal cord dysfunction (greater difficulty with inhalation and exhalation), the majority of patient's symptoms are consistent with asthma.  In light of positive pulmonary function testing and positive bronchodilator response, as well as reduced compliance with medication recommendations, it is possible that this patient's asthma treatment may not currently be optimized.  It is recommended that he continue with recommended medications from pulmonary medicine, and if symptoms remain despite compliance, he will return for a brief course of respiratory retraining therapy for possible paradoxical vocal fold motion disorder.  This is currently scheduled for fall 2022.  She will cancel if symptoms resolve prior to that point..    A course of speech therapy is recommended to optimize laryngeal and respiratory  mechanics.    He demonstrates a Guarded prognosis for improvement given adherence to therapeutic recommendations.     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  -- demonstrate relaxed throat breathing and laryngeal release in order to reduce upper airway constriction with 90% accy and min cues.    Long-term goal(s): In 3 months, Thomas will:  -- report a week of typical activities, in which shortness of breath does not exceed a level  of  3 out of 10, 80% of the time.    This treatment plan was developed with the patient who agreed with the recommendations.    TOTAL SERVICE TIME: 60 minutes  EVALUATION OF VOICE AND RESONANCE (10014)  NO CHARGE FACILITY FEE (52946)    Speech recognition software may have been used in this documentation; input is reviewed before signature to the best of my ability.     Rommel Vinson, Ph.D., [facility]-SLP  Speech-Language Pathologist-[facility]  [phone]  he/him/his

## 2022-07-12 NOTE — PATIENT INSTRUCTIONS
1.  You were seen in the ENT Clinic today by . If you have any questions or concerns after your appointment, please call [phone]. Press option #1 for scheduling related needs. Press option #3 for Nurse advice.    2.   has recommended  the following:   - breathing therapy   - use steroid inhaler daily    3.  Plan is to return to clinic as needed after therapy      Stephania Carpenter LPN  [phone]  [facility] - Otolaryngology

## 2023-01-07 ENCOUNTER — APPOINTMENT (OUTPATIENT)
Dept: MRI IMAGING | Facility: CLINIC | Age: 38
End: 2023-01-07
Attending: EMERGENCY MEDICINE
Payer: COMMERCIAL

## 2023-01-07 ENCOUNTER — HOSPITAL ENCOUNTER (OUTPATIENT)
Facility: CLINIC | Age: 38
Setting detail: OBSERVATION
Discharge: HOME | End: 2023-01-08
Attending: EMERGENCY MEDICINE | Admitting: INTERNAL MEDICINE
Payer: COMMERCIAL

## 2023-01-07 DIAGNOSIS — G89.29 CHRONIC BILATERAL LOW BACK PAIN WITHOUT SCIATICA: Primary | ICD-10-CM

## 2023-01-07 DIAGNOSIS — M54.50 BILATERAL LOW BACK PAIN WITHOUT SCIATICA, UNSPECIFIED CHRONICITY: ICD-10-CM

## 2023-01-07 DIAGNOSIS — R26.2 UNABLE TO AMBULATE: ICD-10-CM

## 2023-01-07 DIAGNOSIS — M54.50 CHRONIC BILATERAL LOW BACK PAIN WITHOUT SCIATICA: Primary | ICD-10-CM

## 2023-01-07 PROCEDURE — 250N000013 HC RX MED GY IP 250 OP 250 PS 637: Performed by: EMERGENCY MEDICINE

## 2023-01-07 PROCEDURE — 250N000011 HC RX IP 250 OP 636: Performed by: EMERGENCY MEDICINE

## 2023-01-07 PROCEDURE — 96376 TX/PRO/DX INJ SAME DRUG ADON: CPT

## 2023-01-07 PROCEDURE — 99285 EMERGENCY DEPT VISIT HI MDM: CPT | Mod: 25

## 2023-01-07 PROCEDURE — 96374 THER/PROPH/DIAG INJ IV PUSH: CPT

## 2023-01-07 PROCEDURE — 72148 MRI LUMBAR SPINE W/O DYE: CPT

## 2023-01-07 PROCEDURE — 99285 EMERGENCY DEPT VISIT HI MDM: CPT | Performed by: EMERGENCY MEDICINE

## 2023-01-07 RX ORDER — OXYCODONE HYDROCHLORIDE 5 MG/1
10 TABLET ORAL ONCE
Status: COMPLETED | OUTPATIENT
Start: 2023-01-07 | End: 2023-01-07

## 2023-01-07 RX ADMIN — OXYCODONE HYDROCHLORIDE 10 MG: 5 TABLET ORAL at 21:02

## 2023-01-07 RX ADMIN — HYDROMORPHONE HYDROCHLORIDE 1 MG: 1 INJECTION, SOLUTION INTRAMUSCULAR; INTRAVENOUS; SUBCUTANEOUS at 22:55

## 2023-01-07 RX ADMIN — HYDROMORPHONE HYDROCHLORIDE 1 MG: 1 INJECTION, SOLUTION INTRAMUSCULAR; INTRAVENOUS; SUBCUTANEOUS at 17:44

## 2023-01-07 ASSESSMENT — ACTIVITIES OF DAILY LIVING (ADL)
ADLS_ACUITY_SCORE: 35
ADLS_ACUITY_SCORE: 33

## 2023-01-07 NOTE — ED PROVIDER NOTES
[facility] [place] EMERGENCY DEPARTMENT ([facility])    1/07/23      ED PROVIDER NOTE   ED 2     History     Chief Complaint   Patient presents with     Back Pain     Sharp Lower back pain, reports pain going down both legs; reports numbness into both thighs; uncomfortable in wheelchair     The history is provided by the patient and medical records.     Thomas Sandhu is a 37 year old male with history of multilevel lumbar spondylosis, chronic low back pain, acquired flat back syndrome who presents with sharp low back pain radiating bilateral legs.  He also has some paresthesias in both of his thighs. He denies any weakness in his bilateral lower extremities but admits that he can't ambulate due to the pain currently. Previous MRI showed mod neural foraminal stenosis bilaterally at L4-L5. He denies any new falls or trauma to his spine. He also denies any F/C/incontinence/saddle anesthesia. He is having normal bowel movement and is urinating normally. He has tried ibuprofen and acetaminophen with no relief. He rates the pain an 8/10.    MR LUMBAR SPINE W/O CONTRAST 2/18/2020   Impression: Multilevel lumbar spondylosis, most pronounced at L4-5  with mild to moderate neural foraminal stenosis bilaterally, mild  spinal canal stenosis and narrowing of the lateral recesses with  abutment of the traversing L5 nerve roots bilaterally. Further  degenerative disease as described above.    XR THORACIC LUMBAR SPINE 2 VW, 10/10/2019   Impression:   1. Flat back, loss of the normal lordosis.  2. Moderate right colonic stool burden.  3. No acute osseous injury identified.        Past Medical History  Past Medical History:   Diagnosis Date     Back pain      Past Surgical History:   Procedure Laterality Date     ESOPHAGOSCOPY, GASTROSCOPY, DUODENOSCOPY (EGD), COMBINED N/A 11/1/2021    Procedure: ESOPHAGOGASTRODUODENOSCOPY, WITH BIOPSY;  Surgeon: Davy Mcguire MD;  Location: [facility] OR     albuterol (PROAIR HFA/PROVENTIL  HFA/VENTOLIN HFA) 108 (90 Base) MCG/ACT inhaler  budesonide-formoterol (SYMBICORT) 80-4.5 MCG/ACT Inhaler  omeprazole (PRILOSEC) 40 MG DR capsule      No Known Allergies  Family History  No family history on file.  Social History   Social History     Tobacco Use     Smoking status: Never     Smokeless tobacco: Never   Substance Use Topics     Alcohol use: No     Drug use: No      Past medical history, past surgical history, medications, allergies, family history, and social history were reviewed with the patient. No additional pertinent items.      A complete review of systems was performed with pertinent positives and negatives noted in the HPI, and all other systems negative.    Physical Exam   BP: 108/68  Pulse: 71  Temp: 98.2  F (36.8  C)  Resp: 18  Weight:  (too much pain to stand)  SpO2: 100 %  Physical Exam  Vitals and nursing note reviewed.   Constitutional:       General: He is not in acute distress.     Appearance: He is not toxic-appearing.   HENT:      Head: Normocephalic and atraumatic.   Eyes:      Conjunctiva/sclera: Conjunctivae normal.      Pupils: Pupils are equal, round, and reactive to light.   Cardiovascular:      Rate and Rhythm: Normal rate and regular rhythm.      Heart sounds: No murmur heard.    No friction rub. No gallop.   Pulmonary:      Effort: Pulmonary effort is normal. No respiratory distress.      Breath sounds: Normal breath sounds. No wheezing, rhonchi or rales.   Abdominal:      Palpations: Abdomen is soft.      Tenderness: There is no abdominal tenderness. There is no right CVA tenderness, left CVA tenderness, guarding or rebound.   Musculoskeletal:      Cervical back: Normal, normal range of motion and neck supple.      Thoracic back: Normal.      Lumbar back: Tenderness and bony tenderness present. Positive left straight leg raise test. Negative right straight leg raise test.      Comments: Tenderness to palpation over the midline lumbar spine and paraspinal muscles over  L3-L5.   Skin:     General: Skin is warm and dry.      Findings: No erythema or rash.   Neurological:      General: No focal deficit present.      Mental Status: He is alert and oriented to person, place, and time.      Cranial Nerves: No cranial nerve deficit.      Sensory: No sensory deficit.      Motor: No weakness.      Deep Tendon Reflexes: Reflexes normal.   Psychiatric:         Mood and Affect: Mood normal.         Behavior: Behavior normal.         Thought Content: Thought content normal.         Judgment: Judgment normal.           ED Course, Procedures, & Data      Procedures       MRI lumbar spine shows:  1. No significant change since 02/18/2020. Stable multilevel lumbar spondylosis.  2. Small right subarticular disc protrusion at L5-S1 displaces and possibly impinges the descending right S1 nerve roots.  3. Small right central disc protrusion at L2-L3 contacts the descending right L3 nerve roots.  4. Small central disc protrusion at L4-L5 contacts both descending L5 nerve roots.  5. Mild to moderate spinal canal stenosis L2-L5.  6. Mild to moderate bilateral neural foraminal stenosis at L4-L5. Mild neural foraminal stenosis on the right at L2-L3 and bilaterally at L3-L4, L5-S1.       Pt received 1mg dilaudid IV with short-term improvement in pain. He was then given 10mg oral oxycodone and was able to hobble to the bathroom and could move his legs better but still in significant pain. He was then given another dose of dilaudid with the goal of ambulation.       Results for orders placed or performed during the hospital encounter of 01/07/23   Lumbar spine MRI w/o contrast     Status: None    Narrative    EXAM: MR LUMBAR SPINE W/O CONTRAST  LOCATION: [facility]  DATE/TIME: 1/7/2023 7:56 PM    INDICATION: Low back pain; no applicable indication.  COMPARISON: Lumbar spine MRI 02/18/2020.  TECHNIQUE: Routine Lumbar Spine MRI without IV contrast.    FINDINGS:   5  lumbar type vertebrae counting down from the presumed 12th ribs. The tip of the conus medullaris is at T12-L1. The cauda equina nerve roots are within normal limits.    Normal alignment of the lumbar vertebrae. Straightening of the normal lumbar lordosis. Minimal mixed Modic type I and type II apposing endplate degenerative signal changes about the L5-S1 disc space. Normal vertebral body heights. No fracture,   destructive bone lesion or infection. The paraspinous soft tissues and visualized bony pelvis are unremarkable.    Findings on a level by level basis are as follows:    T12-L1: Normal disc height and signal. No disc herniation. Normal facets. No significant spinal canal or neural foraminal stenosis.    L1-L2: Normal disc height and signal. No disc herniation. Normal facets. No significant spinal canal or neural foraminal stenosis.    L2-L3: Minimal disc height loss. Loss of disc signal. Shallow disc bulge with superimposed 4 mm AP dimension right central disc protrusion, which partially effaces the right lateral recess and contacts the descending right L3 nerve roots. Normal facets.   Mild right neural foraminal stenosis. Left neural foramen is widely patent. Mild to moderate spinal canal stenosis.    L3-L4: Mild disc height loss. Loss of disc signal. Disc bulge with superimposed 4 mm AP dimension central disc protrusion with associated annular fissuring. No significant nerve root contact or displacement. Normal facets. Mild bilateral neural foraminal   stenosis. Mild to moderate spinal canal stenosis.    L4-L5: Mild disc height loss. Loss of disc signal. Disc bulge with superimposed 5 mm AP dimension central disc protrusion and associated annular fissuring. Partial effacement of the lateral recesses with disc material contacting the descending L5 nerve   roots. Normal facets. Mild to moderate bilateral neural foraminal stenosis. Mild to moderate spinal canal stenosis.    L5-S1: Mild to moderate disc height  loss. Loss of disc signal. Disc bulge with superimposed 5 mm AP dimension right subarticular disc protrusion, which displaces and possibly impinges the descending right S1 nerve roots. Normal facets. Mild bilateral   neural foraminal stenosis. No significant spinal canal stenosis.      Impression    IMPRESSION:  1. No significant change since 02/18/2020. Stable multilevel lumbar spondylosis.  2. Small right subarticular disc protrusion at L5-S1 displaces and possibly impinges the descending right S1 nerve roots.  3. Small right central disc protrusion at L2-L3 contacts the descending right L3 nerve roots.  4. Small central disc protrusion at L4-L5 contacts both descending L5 nerve roots.  5. Mild to moderate spinal canal stenosis L2-L5.  6. Mild to moderate bilateral neural foraminal stenosis at L4-L5. Mild neural foraminal stenosis on the right at L2-L3 and bilaterally at L3-L4, L5-S1.     Medications   HYDROmorphone (DILAUDID) injection 1 mg (1 mg Intravenous Given 1/7/23 6712)   oxyCODONE (ROXICODONE) tablet 10 mg (10 mg Oral Given 1/7/23 3422)   HYDROmorphone (DILAUDID) injection 1 mg (1 mg Intravenous Given 1/7/23 3935)     Labs Ordered and Resulted from Time of ED Arrival to Time of ED Departure - No data to display  Lumbar spine MRI w/o contrast   Final Result   IMPRESSION:   1. No significant change since 02/18/2020. Stable multilevel lumbar spondylosis.   2. Small right subarticular disc protrusion at L5-S1 displaces and possibly impinges the descending right S1 nerve roots.   3. Small right central disc protrusion at L2-L3 contacts the descending right L3 nerve roots.   4. Small central disc protrusion at L4-L5 contacts both descending L5 nerve roots.   5. Mild to moderate spinal canal stenosis L2-L5.   6. Mild to moderate bilateral neural foraminal stenosis at L4-L5. Mild neural foraminal stenosis on the right at L2-L3 and bilaterally at L3-L4, L5-S1.             Medical Decision Making  The patient  presented with a problem that is a chronic illness mild to moderate exacerbation, progression, or side effect of treatment.    The patient's evaluation involved:  review of 3+ prior external note(s) (see separate area of note for details)  ordering and review of 3+ test(s) (see separate area of note for details)  review of 3+ test result(s) ordered prior to this encounter (see separate area of note for details)    The patient's management involved a parenteral controlled substance and a decision regarding hospitalization.      Assessment & Plan    38 yo male here with acute on chronic low back pain with difficulty ambulating due to pain. He has a known history of multilevel lumbar spondylosis based on an MRI in 2020 with mild to mod neural foraminal stenosis bilaterally at L4-L5. He has not weakness or change in sensation in his bilateral lower extremities today, but based on his inability to ambulate a lumbar MRI is indicated to r/o worsening disease.    MRI lumbar spine did not show any significant change from his MRI from 2020.    He was given pain medication x3 and ambulation attempted multiple times without success. He will be admitted to medicine for observation and PT and possibly an epidural steroid injection.    I have reviewed the nursing notes. I have reviewed the findings, diagnosis, plan and need for follow up with the patient.    New Prescriptions    No medications on file       Final diagnoses:   Bilateral low back pain without sciatica, unspecified chronicity   Unable to ambulate       Gonzalo Mina MD  [facility] EMERGENCY DEPARTMENT  1/7/2023     Gonzalo Mina MD  01/08/23 0022

## 2023-01-08 VITALS
DIASTOLIC BLOOD PRESSURE: 54 MMHG | HEART RATE: 58 BPM | TEMPERATURE: 97 F | SYSTOLIC BLOOD PRESSURE: 97 MMHG | OXYGEN SATURATION: 96 % | RESPIRATION RATE: 16 BRPM

## 2023-01-08 LAB
ANION GAP SERPL CALCULATED.3IONS-SCNC: 2 MMOL/L (ref 3–14)
BUN SERPL-MCNC: 11 MG/DL (ref 7–30)
CALCIUM SERPL-MCNC: 9.2 MG/DL (ref 8.5–10.1)
CHLORIDE BLD-SCNC: 104 MMOL/L (ref 94–109)
CO2 SERPL-SCNC: 32 MMOL/L (ref 20–32)
CREAT SERPL-MCNC: 0.8 MG/DL (ref 0.66–1.25)
ERYTHROCYTE [DISTWIDTH] IN BLOOD BY AUTOMATED COUNT: 13.6 % (ref 10–15)
GFR SERPL CREATININE-BSD FRML MDRD: >90 ML/MIN/1.73M2
GLUCOSE BLD-MCNC: 95 MG/DL (ref 70–99)
HCT VFR BLD AUTO: 45.8 % (ref 40–53)
HGB BLD-MCNC: 14.5 G/DL (ref 13.3–17.7)
MCH RBC QN AUTO: 28.2 PG (ref 26.5–33)
MCHC RBC AUTO-ENTMCNC: 31.7 G/DL (ref 31.5–36.5)
MCV RBC AUTO: 89 FL (ref 78–100)
PLATELET # BLD AUTO: 269 10E3/UL (ref 150–450)
POTASSIUM BLD-SCNC: 3.4 MMOL/L (ref 3.4–5.3)
RBC # BLD AUTO: 5.15 10E6/UL (ref 4.4–5.9)
SODIUM SERPL-SCNC: 138 MMOL/L (ref 133–144)
WBC # BLD AUTO: 5.1 10E3/UL (ref 4–11)

## 2023-01-08 PROCEDURE — G0378 HOSPITAL OBSERVATION PER HR: HCPCS

## 2023-01-08 PROCEDURE — 250N000013 HC RX MED GY IP 250 OP 250 PS 637: Performed by: INTERNAL MEDICINE

## 2023-01-08 PROCEDURE — 99207 PR APP CREDIT; MD BILLING SHARED VISIT: CPT | Performed by: INTERNAL MEDICINE

## 2023-01-08 PROCEDURE — 99236 HOSP IP/OBS SAME DATE HI 85: CPT | Performed by: INTERNAL MEDICINE

## 2023-01-08 PROCEDURE — 999N000147 HC STATISTIC PT IP EVAL DEFER

## 2023-01-08 PROCEDURE — 80048 BASIC METABOLIC PNL TOTAL CA: CPT | Performed by: INTERNAL MEDICINE

## 2023-01-08 PROCEDURE — 250N000012 HC RX MED GY IP 250 OP 636 PS 637: Performed by: INTERNAL MEDICINE

## 2023-01-08 PROCEDURE — 36415 COLL VENOUS BLD VENIPUNCTURE: CPT | Performed by: INTERNAL MEDICINE

## 2023-01-08 PROCEDURE — 85027 COMPLETE CBC AUTOMATED: CPT | Performed by: INTERNAL MEDICINE

## 2023-01-08 RX ORDER — ACETAMINOPHEN 325 MG/1
975 TABLET ORAL EVERY 8 HOURS
Status: DISCONTINUED | OUTPATIENT
Start: 2023-01-08 | End: 2023-01-08 | Stop reason: HOSPADM

## 2023-01-08 RX ORDER — IBUPROFEN 600 MG/1
600 TABLET, FILM COATED ORAL EVERY 6 HOURS PRN
Status: DISCONTINUED | OUTPATIENT
Start: 2023-01-08 | End: 2023-01-08

## 2023-01-08 RX ORDER — ONDANSETRON 4 MG/1
4 TABLET, ORALLY DISINTEGRATING ORAL EVERY 6 HOURS PRN
Status: DISCONTINUED | OUTPATIENT
Start: 2023-01-08 | End: 2023-01-08 | Stop reason: HOSPADM

## 2023-01-08 RX ORDER — FLUTICASONE FUROATE AND VILANTEROL 100; 25 UG/1; UG/1
1 POWDER RESPIRATORY (INHALATION) DAILY
Status: CANCELLED | OUTPATIENT
Start: 2023-01-08

## 2023-01-08 RX ORDER — METHOCARBAMOL 500 MG/1
500 TABLET, FILM COATED ORAL 4 TIMES DAILY
Status: DISCONTINUED | OUTPATIENT
Start: 2023-01-08 | End: 2023-01-08

## 2023-01-08 RX ORDER — OXYCODONE HYDROCHLORIDE 5 MG/1
5-10 TABLET ORAL EVERY 4 HOURS PRN
Status: DISCONTINUED | OUTPATIENT
Start: 2023-01-08 | End: 2023-01-08

## 2023-01-08 RX ORDER — METHOCARBAMOL 500 MG/1
500 TABLET, FILM COATED ORAL 4 TIMES DAILY
Qty: 30 TABLET | Refills: 0 | Status: SHIPPED | OUTPATIENT
Start: 2023-01-08

## 2023-01-08 RX ORDER — ALBUTEROL SULFATE 90 UG/1
2 AEROSOL, METERED RESPIRATORY (INHALATION) EVERY 6 HOURS
Status: CANCELLED | OUTPATIENT
Start: 2023-01-08

## 2023-01-08 RX ORDER — GABAPENTIN 300 MG/1
300 CAPSULE ORAL 3 TIMES DAILY
Status: DISCONTINUED | OUTPATIENT
Start: 2023-01-08 | End: 2023-01-08

## 2023-01-08 RX ORDER — LIDOCAINE 4 G/G
2 PATCH TOPICAL EVERY 24 HOURS
Qty: 20 PATCH | Refills: 0 | Status: SHIPPED | OUTPATIENT
Start: 2023-01-08

## 2023-01-08 RX ORDER — LIDOCAINE 4 G/G
2 PATCH TOPICAL
Status: DISCONTINUED | OUTPATIENT
Start: 2023-01-08 | End: 2023-01-08 | Stop reason: HOSPADM

## 2023-01-08 RX ORDER — PREDNISONE 20 MG/1
40 TABLET ORAL ONCE
Status: DISCONTINUED | OUTPATIENT
Start: 2023-01-08 | End: 2023-01-08

## 2023-01-08 RX ORDER — KETOROLAC TROMETHAMINE 30 MG/ML
30 INJECTION, SOLUTION INTRAMUSCULAR; INTRAVENOUS EVERY 6 HOURS PRN
Status: DISCONTINUED | OUTPATIENT
Start: 2023-01-08 | End: 2023-01-08

## 2023-01-08 RX ORDER — PREDNISONE 20 MG/1
40 TABLET ORAL ONCE
Status: COMPLETED | OUTPATIENT
Start: 2023-01-08 | End: 2023-01-08

## 2023-01-08 RX ORDER — ONDANSETRON 2 MG/ML
4 INJECTION INTRAMUSCULAR; INTRAVENOUS EVERY 6 HOURS PRN
Status: DISCONTINUED | OUTPATIENT
Start: 2023-01-08 | End: 2023-01-08 | Stop reason: HOSPADM

## 2023-01-08 RX ORDER — AMOXICILLIN 250 MG
2 CAPSULE ORAL 2 TIMES DAILY PRN
Status: DISCONTINUED | OUTPATIENT
Start: 2023-01-08 | End: 2023-01-08 | Stop reason: HOSPADM

## 2023-01-08 RX ORDER — ACETAMINOPHEN 325 MG/1
975 TABLET ORAL EVERY 8 HOURS
Qty: 30 TABLET | Refills: 0 | Status: SHIPPED | OUTPATIENT
Start: 2023-01-08

## 2023-01-08 RX ORDER — METHOCARBAMOL 500 MG/1
500 TABLET, FILM COATED ORAL 4 TIMES DAILY
Status: DISCONTINUED | OUTPATIENT
Start: 2023-01-08 | End: 2023-01-08 | Stop reason: HOSPADM

## 2023-01-08 RX ORDER — AMOXICILLIN 250 MG
1 CAPSULE ORAL 2 TIMES DAILY PRN
Status: DISCONTINUED | OUTPATIENT
Start: 2023-01-08 | End: 2023-01-08 | Stop reason: HOSPADM

## 2023-01-08 RX ADMIN — OXYCODONE HYDROCHLORIDE 10 MG: 5 TABLET ORAL at 06:12

## 2023-01-08 RX ADMIN — IBUPROFEN 600 MG: 600 TABLET ORAL at 11:22

## 2023-01-08 RX ADMIN — ACETAMINOPHEN 975 MG: 325 TABLET, FILM COATED ORAL at 13:48

## 2023-01-08 RX ADMIN — METHOCARBAMOL 500 MG: 500 TABLET ORAL at 08:08

## 2023-01-08 RX ADMIN — ACETAMINOPHEN 975 MG: 325 TABLET, FILM COATED ORAL at 06:12

## 2023-01-08 RX ADMIN — PREDNISONE 40 MG: 20 TABLET ORAL at 13:48

## 2023-01-08 RX ADMIN — GABAPENTIN 300 MG: 300 CAPSULE ORAL at 11:22

## 2023-01-08 RX ADMIN — LIDOCAINE PATCH 4% 2 PATCH: 40 PATCH TOPICAL at 08:08

## 2023-01-08 ASSESSMENT — ACTIVITIES OF DAILY LIVING (ADL)
ADLS_ACUITY_SCORE: 33
ADLS_ACUITY_SCORE: 37
ADLS_ACUITY_SCORE: 35
ADLS_ACUITY_SCORE: 37
ADLS_ACUITY_SCORE: 37
ADLS_ACUITY_SCORE: 33

## 2023-01-08 NOTE — PLAN OF CARE
Goal Outcome Evaluation:           Overall Patient Progress: no changeOverall Patient Progress: no change    Outcome Evaluation: Pain remains to back, working to manage with pain medication. PT ordered and will see today.      VS: VSS  Temp: 97  F (36.1  C) Temp src: Oral BP: 97/54 Pulse: 58   Resp: 16 SpO2: 96 % O2 Device: None (Room air)     O2: >90% on RA, denies SOB or CP. LSCTA   Output: Voiding without difficulty in BR, continent.    Last BM: +BS sounds, continent.   Activity: Up with SBA/ind to BR, slow moving due to pain. PT ordered cleared pt for discharge home with cane. Referral placed for outpatient PT.    Skin: Skin intact, warm and dry   Pain: Pain localized to lower back bilaterally L>R. Applied lidocaine patches, given scheduled robaxin, and PRN oxycodone. Also given a dose of gabapentin, ibuprofen, and 40mg 1x dose of prednisone. Pain improved prior to discharging.    CMS: Reporting numbness sensation to bilateral thighs, DP +2 bilaterally and able to wiggle toes.    Dressing: No dressings in place. Lidocaine patches on lower back.   Diet: Regular diet, denies N/V. Adequate intake of PO fluids. Family bringing in food for pt.    LDA: PIV removed for discharge.   Equipment: Personal belongings at bedside   Plan: Discharged home after clearing from physical therapy. Given number to call for epidural injection if needed. Referral for outpatient PT.   Additional Info: Pt wanted to discharge home since he cannot receive epidural injection inpatient.     DISCHARGE SUMMARY    Pt discharging to: Home  Transportation: Friend  AVS given and discussed: Yes   Stoplight Tool given and discussed: N/A   Medications given: E prescribed to local pharmacy  Belongings returned: Yes  Comments: Pt left unit at 1545 with friend. Refused wheelchair ride, wanted to ambulate downstairs to car. Cane dispensed to patient.

## 2023-01-08 NOTE — PROVIDER NOTIFICATION
Notified Dr. Dobson:  Pt in 552 () is now wanting to discharge home today, states he can't lay in the hospital if not getting the injection. I told him to see PT first to ensure he is safe. His pain is down to 7/10.   Toshia  [phone]

## 2023-01-08 NOTE — PLAN OF CARE
Goal Outcome Evaluation: Adequate for discharge.    Outcome Evaluation: Discharged to home with improved pain. Planning for outpatient epidural injection if still needed.

## 2023-01-08 NOTE — PLAN OF CARE
PT: Patient is safe to discharge home at this time. Demonstrated safety and independence with all mobility including ambulation in hallway with single end cane (SEC). Distributed SEC for use with community mobility. Pt declined outpatient physical therapy at this time. Pt has no further inpatient PT needs. Will complete orders.    PAIGE NguyenT

## 2023-01-08 NOTE — PROGRESS NOTES
"Patient has pain on lower back readitiing to right buttock   No urinary or bowel complains   No fever or chills  Was shoveling snow 2 days ago and his chronic pain got exacerbated   OTC pain medications have been ineffective    On Exam ;   Alert and oriented . No acute distress  Vital signs:  Temp: 97  F (36.1  C) Temp src: Oral BP: 97/54 Pulse: 58   Resp: 16 SpO2: 96 % O2 Device: None (Room air)     Weight:  (too much pain to stand)  Estimated body mass index is 26.58 kg/m  as calculated from the following:    Height as of 7/12/22: 1.88 m (6' 2\").    Weight as of 7/12/22: 93.9 kg (207 lb).  Neck ; supple , no JVD  Chest ; equal BS bilaterally , no rales or rhonchi   CVS; RRR, no murmur /rubs or gallops  GI ; soft abdomen, non tender, BS positive  Ext ; no edema , no cynosis  Neuro ; CN 2 to 12 grossly intact , No Facial asymmetry noticed  .  Power and tone and bulk is equal BL  sensation is intact of touch and pain   Psych ; appropriate mood and effect  Skin ; no rash or purpura on exposed skin     Labs ; none today   Imaging MRI reviewed     Notes of ER doctor and Ho and P reviewed     Spoke with Pain team , they are not able to offer inpatient epidural   [phone] can be called to schedule    Consulted IR and spoke . Not standard procedure as inpatient. Recommend anesthesia conuslt  Spoke with RN and pharmacy as well       Acute on chronic back pain   Mild to moderate spinal canal stenosis L2-L5  Multilevel lumbar spondylosis  Multiple disc protrusions (L2-L3, L4-L5, L5-S1)    Tylenol schedule  Gabapentin schedule ( pain radiating to thigh)   Lidoderm   Motrin   Prednisone .. medrol dose pack   Toradol IV   Check routine labs     55 minutes spent                   "

## 2023-01-08 NOTE — PROGRESS NOTES
Patient arrived on unit via wheelchair. Currently praying. Able to stand and barely walk around in the room.  Grimacing from back pain.

## 2023-01-08 NOTE — CONSULTS
Thomas Sandhu is a 37 year old male with history of multilevel lumbar spondylosis, chronic low back pain, acquired flat back syndrome who presents with sharp low back pain radiating bilateral legs.  He also has some paresthesias in both of his thighs. He denies any weakness in his bilateral lower extremities but admits that he can't ambulate due to the pain currently. Previous MRI showed mod neural foraminal stenosis bilaterally at L4-L5. He denies any new falls or trauma to his spine. He also denies any F/C/incontinence/saddle anesthesia. IR consulted for epidural steroid injection.     Per consult order and chart review, no callback number given for ordering provider. General interventional radiology does not typically perform this procedure. Per chart review, other appropriate consulting services have been requested for this procedure.     IR will sign off at this time.     Beto Grewal MD  Interventional Radiology PGY-3  IR pass pager: [phone]

## 2023-01-08 NOTE — DISCHARGE SUMMARY
Admit Date: 01/07/2023  Discharge Date:     DISCHARGE DIAGNOSES:     1.  Acute on chronic bilateral low back pain.  2.  Mild to moderate spinal canal stenosis at the level of L2 to L5.  3.  Multilevel lumbar spondylosis.  4.  Mild disk protrusions at L2, L3, L4, L5 and S1.  5.  Moderate persistent asthma without acute exacerbation.  6.  Gastroesophageal reflux disease (GERD).  7.  History of H. pylori, treated.    HOSPITAL COURSE:  Mr. Sandhu is a 37-year-old [place] gentleman with chronic bilateral low back pain and moderate persistent asthma, prior H. pylori infection, who was admitted to the [facility] on 01/07/2023 for uncontrolled back pain and inability to ambulate secondary to the pain.  He had an MRI done in the Emergency Department prior to admission, which largely was unchanged from a prior one done in 2020.  He was unable to ambulate by taking oral medications at home, which included Tylenol and ibuprofen.  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  He was admitted under observation.  He was given 40 mg of p.o. prednisone in addition continued on Robaxin and Tylenol and Lidoderm patches.  His pain improved.  He was interested in getting an epidural injection.  Interventional radiology pain team and anesthesia were consulted.  At this time, the patient will connect with the pain team as an outpatient and schedule for an injection if required as this not offered during inpatient stay per report     PHYSICAL EXAMINATION ON DISCHARGE:  GENERAL:  Alert and oriented, no acute distress.    VITAL SIGNS:  Blood pressure was 98/54, heart rate of 58, temperature 97.5.  HEAD:  Atraumatic, normocephalic.  Pupils equal, round, and reactive to light.  Extraocular muscles intact.  Mouth mucosa is moist.  NECK:  Supple.  CHEST:   Clear.  CARDIOVASCULAR:  Regular rate and rhythm.  GASTROINTESTINAL:  Abdomen is soft, nontender.  Bowel sounds are positive.  EXTREMITIES:  No edema.  NEUROLOGIC:  Cranial nerves II-XII is grossly intact.  Power tone and bulk is equal bilaterally.  No pronator drift.  No facial asymmetry.  Deep tendon reflexes were intact.  Pain and sensation were intact in both lower extremities.  He had no urinary or bowel complaints.      LABS ON DISCHARGE:  BMP and CBC was reviewed, it was normal.    MRI spine   No significant change since 2020. Stable multilevel lumbar spondylosis.  2. Small right subarticular disc protrusion at L5-S1 displaces and possibly impinges the descending right S1 nerve roots.  3. Small right central disc protrusion at L2-L3 contacts the descending right L3 nerve roots.  4. Small central disc protrusion at L4-L5 contacts both descending L5 nerve roots.  5. Mild to moderate spinal canal stenosis L2-L5.  6. Mild to moderate bilateral neural foraminal stenosis at L4-L5. Mild neural foraminal stenosis on the right at L2-L3 and bilaterally at L3-L4, L5-S1    Please note, over 50 minutes were arranging the discharge of this patient.  The patient was seen 2 times.  His course of events were discussed with pain team, anesthesia, interventional radiology, patient's nurse.  In addition, [facility] .    Senia Dobson MD        D: 2023   T: 2023   MT: TONIO    Name:     SONYA MALIK  MRN:      [id number]        Account:      [id number]   :      1985           Service Date: 2023       Document: [id number]
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
## 2023-01-08 NOTE — DISCHARGE SUMMARY
Patient requesting discharge   Discussed care with interpretor  Awaits to work with PT   Out patient PT referral given

## 2023-01-09 ENCOUNTER — HOSPITAL ENCOUNTER (OUTPATIENT)
Dept: PHYSICAL THERAPY | Facility: REHABILITATION | Age: 38
Discharge: HOME | End: 2023-01-09
Attending: INTERNAL MEDICINE
Payer: COMMERCIAL

## 2023-01-09 DIAGNOSIS — M54.50 BILATERAL LOW BACK PAIN WITHOUT SCIATICA, UNSPECIFIED CHRONICITY: ICD-10-CM

## 2023-01-09 PROCEDURE — 97110 THERAPEUTIC EXERCISES: CPT | Mod: GP | Performed by: PHYSICAL THERAPIST

## 2023-01-09 PROCEDURE — 97161 PT EVAL LOW COMPLEX 20 MIN: CPT | Mod: GP | Performed by: PHYSICAL THERAPIST

## 2023-01-09 NOTE — PROGRESS NOTES
[facility]    OUTPATIENT PHYSICAL THERAPY ORTHOPEDIC EVALUATION  PLAN OF TREATMENT FOR OUTPATIENT REHABILITATION  (COMPLETE FOR INITIAL CLAIMS ONLY)  Patient's Last Name, First Name, M.I.  YOB: 1985  Thomas Sandhu       Provider s Name:  [facility]   Medical Record No.  [id number]   Start of Care Date:  01/09/23   Onset Date:  (P) 01/08/23   Type:     _X__PT   ___OT   ___SLP Medical Diagnosis:  (P) M54.50 (ICD-10-CM) - Bilateral low back pain without sciatica, unspecified chronicity     PT Diagnosis:  (P) SI joint dysfunction, low back pain, limited mobility   Visits from SOC:  1      _________________________________________________________________________________  Plan of Treatment/Functional Goals:  (P) gait training, joint mobilization, manual therapy, neuromuscular re-education, ROM, strengthening, stretching           Goals  Goal Identifier: (P) sitting  Goal Description: (P) Patient will be able to complete 4 hour route without sx increased 3/10 or greater.  Target Date: (P) 03/06/23    Goal Identifier: (P) standing  Goal Description: (P) Patient will be able to stand 60 minutes for work with 3/10 or less pain to improve work preformance.  Target Date: (P) 03/06/23    Goal Identifier: (P) walking  Goal Description: (P) Patient will be able to ambulate 20 minutes at functional pace without pain greater than 3/10.  Target Date: (P) 03/06/23    Goal Identifier: (P) squat/lift/carry  Goal Description: (P) Patient will be able to squat/lift/carry work items/household items without pain greater than 3/10.  Target Date: (P) 03/06/23    Goal Identifier: (P) sleep  Goal Description: (P) Patient will be able to sleep for 6 hours without waking due to pain.  Target Date: (P) 03/06/23    Goal Identifier: (P) HEP  Goal Description: (P) Patient will be able to demonstrate 6  exercises without assistance for progression to independent management.  Target Date: (P) 03/06/23                          Therapy Frequency:  (P) 1 time/week  Predicted Duration of Therapy Intervention:  (P) 8 weeks    Jameson Anderson, PT                 I CERTIFY THE NEED FOR THESE SERVICES FURNISHED UNDER        THIS PLAN OF TREATMENT AND WHILE UNDER MY CARE     (Physician co-signature of this document indicates review and certification of the therapy plan).                     Certification Date From:  (P) 01/09/23   Certification Date To:  (P) 01/29/23    Referring Provider:  Senia Dobson MD (ED/hospital) (Karen Agarwal MD)    Initial Assessment        See Epic Evaluation Start of Care Date: 01/09/23 01/09/23 1420   General Information   Type of Visit Initial OP Ortho PT Evaluation   Start of Care Date 01/09/23   Referring Physician Senia Dobson MD (ED/hospital)  (Karen Agarwal MD)   Patient/Family Goals Statement reduce pain   Orders Evaluate and Treat   Date of Order 01/08/22   Certification Required? Yes   Medical Diagnosis M54.50 (ICD-10-CM) - Bilateral low back pain without sciatica, unspecified chronicity   Surgical/Medical history reviewed Yes   Precautions/Limitations no known precautions/limitations       Present Yes;No  ( arrived late and went over paperwork with patient, patient refused and signed waiver for )   Body Part(s)   Body Part(s) Lumbar Spine/SI   Presentation and Etiology   Pertinent history of current problem (include personal factors and/or comorbidities that impact the POC) Patient is a 37 year old who comes in with low back pain that radiates L <> R with sx into L LE pain and some numbness, long history of low back pain since 2016, increased in intesity in last 2 weeks, causing difficulty with sitting about 3 hours for work (prior could sit 4 hours), walking 200-300 feet, shower/ADLs, squatting/lifting, standing 2-3 min  (needs to be able to stand 60 min for work), and sleeping is difficult waking 2 x due to pain.   Impairments A. Pain;D. Decreased ROM;E. Decreased flexibility;F. Decreased strength and endurance;H. Impaired gait;K. Numbness   Functional Limitations perform required work activities;perform activities of daily living   Symptom Location low back L <>R of center and L lower leg to knee   How/Where did it occur From insidious onset  (possibly with shoveling snow)   Onset date of current episode/exacerbation 01/08/23   Chronicity Recurrent   Pain rating (0-10 point scale) Best (/10);Worst (/10);Other   Best (/10) 2/10   Worst (/10) 10/10   Pain rating comment current 6/10   Pain quality A. Sharp;D. Burning;E. Shooting;F. Stabbing   Frequency of pain/symptoms A. Constant   Pain/symptoms are: Other  (dependent on what you are doing)   Pain/symptoms exacerbated by A. Sitting;B. Walking;C. Lifting;D. Carrying;G. Certain positions;I. Bending;J. ADL;K. Home tasks;L. Work tasks   Pain/symptoms eased by C. Rest;E. Changing positions;F. Certain positions;I. OTC medication(s)  (ibuprophen, hasn't taken prescription med yet)   Progression of symptoms since onset: Worsened   Current / Previous Interventions   MRI Results Results   MRI results IMPRESSION:  1. No significant change since 02/18/2020. Stable multilevel lumbar spondylosis.  2. Small right subarticular disc protrusion at L5-S1 displaces and possibly impinges the descending right S1 nerve roots.  3. Small right central disc protrusion at L2-L3 contacts the descending right L3 nerve roots.  4. Small central disc protrusion at L4-L5 contacts both descending L5 nerve roots.  5. Mild to moderate spinal canal stenosis L2-L5.  6. Mild to moderate bilateral neural foraminal stenosis at L4-L5. Mild neural foraminal stenosis on the right at L2-L3 and bilaterally at L3-L4, L5-S1.   Prior Level of Function   Prior Level of Function-Mobility patient did have pain at low back/mid back  however did not have limitation in functional mobility due to pain   Current Level of Function   Patient role/employment history A. Employed   Employment Comments drives truck mostly sitting, some squatting/lifting and prolonged standing   Fall Risk Screen   Fall screen completed by PT   Have you fallen 2 or more times in the past year? No   Have you fallen and had an injury in the past year? No   Is patient a fall risk? No   Abuse Screen (yes response referral indicated)   Feels Unsafe at Home or Work/School no   Feels Threatened by Someone no   Does Anyone Try to Keep You From Having Contact with Others or Doing Things Outside Your Home? no   Physical Signs of Abuse Present no   System Outcome Measures   Outcome Measures Low Back Pain (see Oswestry and Ly)  (40%)   Lumbar Spine/SI Objective Findings   Gait/Locomotion no marked abnormality with ambulation back to treatment room   Flexion ROM severe limitation hands to knee severe increased sx   Extension ROM severe limitation increased sx   Right Side Bending ROM tight on L, to fibular head   Left Side Bending ROM WFL to knee joint   Lumbar ROM Comment Rotation to L pain and tight, R pain on L   Pelvic Screen L posterior rotation   Posture slouched   Planned Therapy Interventions   Planned Therapy Interventions gait training;joint mobilization;manual therapy;neuromuscular re-education;ROM;strengthening;stretching   Clinical Impression   Criteria for Skilled Therapeutic Interventions Met yes, treatment indicated   PT Diagnosis SI joint dysfunction, low back pain, limited mobility   Influenced by the following impairments decreased ROM, impairment in strength, decreased tissue extensibility   Functional limitations due to impairments sleep, standing, sitting, walking, squatting, lifting, carrying   Clinical Presentation Stable/Uncomplicated   Clinical Presentation Rationale presents as expected based on chart review   Clinical Decision Making (Complexity) Low  complexity   Therapy Frequency 1 time/week   Predicted Duration of Therapy Intervention (days/wks) 8 weeks   Risk & Benefits of therapy have been explained Yes   Patient, Family & other staff in agreement with plan of care Yes   Clinical Impression Comments Patient presents with symptoms consistent with SI joint dysfunction will require further skilled PT for additional assessment to reduce pain and restore muscle balance at lumbar spine and pelvis.   Ortho Goal 1   Goal Identifier sitting   Goal Description Patient will be able to complete 4 hour route without sx increased 3/10 or greater.   Goal Progress initiated   Target Date 03/06/23   Ortho Goal 2   Goal Identifier standing   Goal Description Patient will be able to stand 60 minutes for work with 3/10 or less pain to improve work preformance.   Goal Progress initiated   Target Date 03/06/23   Ortho Goal 3   Goal Identifier walking   Goal Description Patient will be able to ambulate 20 minutes at functional pace without pain greater than 3/10.   Goal Progress initiated   Target Date 03/06/23   Ortho Goal 4   Goal Identifier squat/lift/carry   Goal Description Patient will be able to squat/lift/carry work items/household items without pain greater than 3/10.   Goal Progress initiated   Target Date 03/06/23   Ortho Goal 5   Goal Identifier sleep   Goal Description Patient will be able to sleep for 6 hours without waking due to pain.   Goal Progress initiated   Target Date 03/06/23   Ortho Goal 6   Goal Identifier HEP   Goal Description Patient will be able to demonstrate 6 exercises without assistance for progression to independent management.   Goal Progress initiated   Target Date 03/06/23   Total Evaluation Time   PT Eval, Low Complexity Minutes (59770) 30   Therapy Certification   Certification date from 01/09/23   Certification date to 01/29/23   Medical Diagnosis M54.50 (ICD-10-CM) - Bilateral low back pain without sciatica, unspecified chronicity    Jameson Anderson, PT

## 2023-04-26 NOTE — ADDENDUM NOTE
Encounter addended by: Mariel Anderson, PT on: 4/26/2023 8:21 AM   Actions taken: Episode resolved, Pend clinical note, Flowsheet accepted, Clinical Note Signed

## 2023-04-26 NOTE — PROGRESS NOTES
[facility]    Outpatient Physical Therapy Discharge Note  Patient: Thomas Sandhu  : 1985    Beginning/End Dates of Reporting Period:  2023 to 2023    Referring Provider: Karen Agarwal MD    Therapy Diagnosis: low back pain     Client Self Report: see eval    Objective Measurements:  Objective Measure: SI joint  Details: L posterior rotation  Objective Measure: hip strength/LE strength  Details: next visit  Objective Measure: muscle length  Details: next visit         Goals:  Goal Identifier sitting   Goal Description Patient will be able to complete 4 hour route without sx increased 3/10 or greater.   Target Date 23   Date Met      Progress (detail required for progress note): initiated     Goal Identifier standing   Goal Description Patient will be able to stand 60 minutes for work with 3/10 or less pain to improve work preformance.   Target Date 23   Date Met      Progress (detail required for progress note): initiated     Goal Identifier walking   Goal Description Patient will be able to ambulate 20 minutes at functional pace without pain greater than 3/10.   Target Date 23   Date Met      Progress (detail required for progress note): initiated     Goal Identifier squat/lift/carry   Goal Description Patient will be able to squat/lift/carry work items/household items without pain greater than 3/10.   Target Date 23   Date Met      Progress (detail required for progress note): initiated     Goal Identifier sleep   Goal Description Patient will be able to sleep for 6 hours without waking due to pain.   Target Date 23   Date Met      Progress (detail required for progress note): initiated     Goal Identifier HEP   Goal Description Patient will be able to demonstrate 6 exercises without assistance for progression to independent management.   Target Date 23    Date Met      Progress (detail required for progress note): initiated     Goal Identifier     Goal Description     Target Date     Date Met      Progress (detail required for progress note):       Goal Identifier     Goal Description     Target Date     Date Met      Progress (detail required for progress note):             Plan:  Discharge from therapy.    Discharge:    Reason for Discharge: Patient has failed to schedule further appointments.    Discharge Plan: Patient to continue home program.

## 2023-07-11 ENCOUNTER — OFFICE VISIT (OUTPATIENT)
Dept: FAMILY MEDICINE | Facility: CLINIC | Age: 38
End: 2023-07-11
Payer: COMMERCIAL

## 2023-07-11 VITALS
HEIGHT: 74 IN | SYSTOLIC BLOOD PRESSURE: 120 MMHG | OXYGEN SATURATION: 98 % | RESPIRATION RATE: 18 BRPM | WEIGHT: 221.6 LBS | BODY MASS INDEX: 28.44 KG/M2 | DIASTOLIC BLOOD PRESSURE: 63 MMHG | HEART RATE: 89 BPM

## 2023-07-11 DIAGNOSIS — M54.50 CHRONIC BILATERAL LOW BACK PAIN WITHOUT SCIATICA: Primary | ICD-10-CM

## 2023-07-11 DIAGNOSIS — G89.29 CHRONIC BILATERAL LOW BACK PAIN WITHOUT SCIATICA: Primary | ICD-10-CM

## 2023-07-11 PROCEDURE — 99214 OFFICE O/P EST MOD 30 MIN: CPT | Mod: GC

## 2023-07-11 RX ORDER — CAPSAICIN 0.025 %
CREAM (GRAM) TOPICAL 3 TIMES DAILY
Status: CANCELLED | OUTPATIENT
Start: 2023-07-11

## 2023-07-11 RX ORDER — IBUPROFEN 800 MG/1
800 TABLET, FILM COATED ORAL EVERY 8 HOURS PRN
Qty: 90 TABLET | Refills: 1 | Status: SHIPPED | OUTPATIENT
Start: 2023-07-11

## 2023-07-11 RX ORDER — PANTOPRAZOLE SODIUM 40 MG/1
40 TABLET, DELAYED RELEASE ORAL
COMMUNITY
Start: 2023-06-06 | End: 2024-04-04

## 2023-07-11 RX ORDER — DULOXETIN HYDROCHLORIDE 30 MG/1
30 CAPSULE, DELAYED RELEASE ORAL DAILY
Qty: 90 CAPSULE | Refills: 0 | Status: SHIPPED | OUTPATIENT
Start: 2023-07-11 | End: 2023-10-09

## 2023-07-11 NOTE — PROGRESS NOTES
Assessment & Plan     Chronic bilateral low back pain without sciatica  Patient presenting with severe chronic lower back pain that has been present for over 10 years.  Recently hospitalized in January 2023 with back pain limiting his ability to walk.  MRI at that time showed multi level lumbar spine the low cysts with impingement of the descending right S1 nerve, disc protrusion contacting the descending right L3 and L5 nerve roots, and mild to moderate spinal canal stenosis L2-L5 with bilateral neural foraminal stenosis L4-L5.  Compared to prior MRI in 2020, no changes which is reassuring.  Since his hospitalization, he has continued to have lower back pain significantly impairing his quality of life. Has tried PT for extended periods of time in the past at multiple different places as well acupuncture. Although he has had no recent changes to the pain and no red flag symptoms, I am concerned by the level of impairment this patient is facing in his daily life including impairment while working (he is a  and sitting for long periods of time worsens pain) and also that he has failed conservative management. He likely requires a multi-faceted approach to help with pain management. I discussed expectations with him including that he likely will not be pain free but our goal will be to reduce pain. I do think he would benefit from a steroid injection given how significant his pain in and provided a referral today to be evaluated for this. Would also recommend evaluation by [facility]NR and Pain for improved pain management. Discussed starting a neuropathic pain medication like cymbalta, patient agreeable to starting this today. Will start with 30mg with plan to titrate up to 60mg at next visit. Also discussed role of PT and recommendation to continue these exercises to strengthen paravertebral muscles. Referral placed to re-establish with PT.  - Adult Physical Medicine and Rehab  Referral; Future  - Pain  "Management  Referral; Future  - Spine  Referral; Future  - DULoxetine (CYMBALTA) 30 MG capsule; Take 1 capsule (30 mg) by mouth daily for 90 days  - Physical Therapy Referral; Future  - ibuprofen (ADVIL/MOTRIN) 800 MG tablet; Take 1 tablet (800 mg) by mouth every 8 hours as needed for moderate pain        Return in about 2 months (around 9/11/2023).    Macy Correia MD  [facility] MARKO Guzman is a 38 year old, presenting for the following health issues:  Back Pain (Pt reports he has had lower back for a \"long time,\" but denies trauma and cannot pinpoint when it started. Pt reports his pain has increased starting today.)      MARIAMA Guzman is a 38 year old male who complains of low back pain for 10 years, positional with worsening with sitting or standing for long periods of time.  No radiation of pain down the legs.  No history of trauma or accidents.  Has tried physical therapy and acupuncture.  Hospitalization back in January for inability to walk due to back pain.  Ibuprofen helps.  Walking is okay.      None of the following:  Recent trauma (especially major, such as a serious motor vehicle crash)  Older age   Urinary/bowel incontinence  History of malignancy or unexplained weight loss  Chronic corticosteroid use  Bilateral  sciatica   Recent infections   Injection drug use (Will 2018)        Objective    /63 (BP Location: Left arm, Patient Position: Sitting, Cuff Size: Adult Regular)   Pulse 89   Resp 18   Ht 1.88 m (6' 2\")   Wt 100.5 kg (221 lb 9.6 oz)   SpO2 98%   BMI 28.45 kg/m    Body mass index is 28.45 kg/m .  Physical Exam     Constitutional: awake, alert, cooperative, no apparent distress, and appears stated age  Eyes: Lids and lashes normal, pupils equal  ENT: normocepalic, without obvious abnormality  Respiratory: No increased work of breathing  Musculoskeletal: no lower extremity pitting edema present  full range of motion noted  tone " is normal  Back: Patient appears to be in moderate to severe pain, antalgic gait noted.  Tenderness to palpation over L1-L5.  Painful and reduced LS ROM noted. Straight leg raise is negative on bilateral. DTR's, motor strength and sensation normal.  Neurologic: Awake, alert, oriented to name, place and time.       ----- Service Performed and Documented by Resident or Fellow ------

## 2023-07-11 NOTE — PATIENT INSTRUCTIONS
Patient Education   Here is the plan from today's visit    1. Chronic bilateral low back pain without sciatica  - Adult Physical Medicine and Rehab  Referral; Future  - Spine  Referral; Future  - DULoxetine (CYMBALTA) 30 MG capsule; Take 1 capsule (30 mg) by mouth daily for 90 days  Dispense: 90 capsule; Refill: 0  - Physical Therapy Referral; Future  - ibuprofen (ADVIL/MOTRIN) 800 MG tablet; Take 1 tablet (800 mg) by mouth every 8 hours as needed for moderate pain  Dispense: 90 tablet; Refill: 1    For physical therapy:  If you have not heard from the scheduling office within 2 business days, please call [phone] for [place], [phone] for [place] and [phone] for [place].    For the spine doctors:  If you have not heard from the scheduling office within 2 business days, please call [phone] for [place], [phone] for [place] and [phone] for [place].    For the pain doctors:  If you have not heard from the scheduling office within 2 business days, please call [phone] for [place], [phone] for [place] and [phone] for [place].          Please call or return to clinic if your symptoms don't go away.    Follow up plan  Return in about 2 months (around 9/11/2023).    Thank you for coming to [facility] today.  Lab Testing:  **If you had lab testing today and your results are reassuring or normal they will be mailed to you or sent through [employer] within 7 days.   **If the lab tests need quick action we will call you with the results.  **If you are having labs done on a different day, please call [phone] to schedule at [facility] or [phone] for other [facility] locations. Labs do not offer walk-in appointments.  The phone number we will call with results is # [phone] (home) . If this is not the best number please call our clinic and change the number.  Medication Refills:  If you need  any refills please call your pharmacy and they will contact us.   If you need to  your refill at a new pharmacy, please contact the new pharmacy directly. The new pharmacy will help you get your medications transferred faster.   Scheduling:  If you have any concerns about today's visit or wish to schedule another appointment please call our office during normal business hours [phone] (8-5:00 M-F). If you can no longer make a scheduled visit, please cancel via [employer] or call us to cancel.   If a referral was made to an [facility] specialty provider and you do not get a call from central scheduling, please refer to directions on your visit summary or call our office during normal business hours for assistance.   If a Mammogram was ordered for you at the [facility] call [phone] to schedule or change your appointment.  If you had an XRay/CT/Ultrasound/MRI ordered the number is [phone] to schedule or change your radiology appointment.   [facility] has limited ultrasound appointments available on Wednesdays, if you would like your ultrasound at [facility], please call [phone] to schedule.   Medical Concerns:  If you have urgent medical concerns please call [phone] at any time of the day.    Macy Correia MD

## 2023-07-12 ENCOUNTER — APPOINTMENT (OUTPATIENT)
Dept: INTERPRETER SERVICES | Facility: CLINIC | Age: 38
End: 2023-07-12
Payer: COMMERCIAL

## 2023-07-13 NOTE — PROGRESS NOTES
Preceptor Attestation:   Patient seen, evaluated and discussed with the resident. I have verified the content of the note, which accurately reflects my assessment of the patient and the plan of care.   Supervising Physician:  Radha Calixto, DO

## 2023-10-10 NOTE — PROGRESS NOTES
Preceptor Attestation:   Patient seen, evaluated and discussed with the resident. I have verified the content of the note, which accurately reflects my assessment of the patient and the plan of care.   Supervising Physician:  Lauri Hay MD      Wound Care: Petrol[place]

## 2023-10-26 ENCOUNTER — TELEPHONE (OUTPATIENT)
Dept: PALLIATIVE MEDICINE | Facility: OTHER | Age: 38
End: 2023-10-26
Payer: COMMERCIAL

## 2023-10-27 ENCOUNTER — OFFICE VISIT (OUTPATIENT)
Dept: ANESTHESIOLOGY | Facility: CLINIC | Age: 38
End: 2023-10-27
Attending: FAMILY MEDICINE
Payer: COMMERCIAL

## 2023-10-27 VITALS — HEART RATE: 79 BPM | SYSTOLIC BLOOD PRESSURE: 129 MMHG | OXYGEN SATURATION: 98 % | DIASTOLIC BLOOD PRESSURE: 78 MMHG

## 2023-10-27 DIAGNOSIS — M54.50 CHRONIC BILATERAL LOW BACK PAIN WITHOUT SCIATICA: ICD-10-CM

## 2023-10-27 DIAGNOSIS — G89.29 CHRONIC BILATERAL LOW BACK PAIN WITHOUT SCIATICA: ICD-10-CM

## 2023-10-27 DIAGNOSIS — M79.18 MYOFASCIAL PAIN: Primary | ICD-10-CM

## 2023-10-27 DIAGNOSIS — M47.816 LUMBAR SPONDYLOSIS: ICD-10-CM

## 2023-10-27 PROCEDURE — 99204 OFFICE O/P NEW MOD 45 MIN: CPT | Mod: GC | Performed by: STUDENT IN AN ORGANIZED HEALTH CARE EDUCATION/TRAINING PROGRAM

## 2023-10-27 ASSESSMENT — PAIN SCALES - GENERAL: PAINLEVEL: MODERATE PAIN (4)

## 2023-10-27 NOTE — PROGRESS NOTES
[facility] Interventional Evaluation    Date of visit: 10/27/2023    Reason for consultation:    Thomas Sandhu is a 38 year old male who is seen in consultation today for evaluation of an interventional strategy for pain management.    PCP is Karen Agarwal.    Please see the [facility] health questionnaire which the patient completed and reviewed with me in detail.    Chief Complaint:    Chief Complaint   Patient presents with    Consult     INTERVENTIONAL EVAL, 4/10 pain in low back       Pain history:  Thomas Sandhu is a 38 year old male w/ a h/o chronic low back pain, presenting with a chief pain complaint of chronic low back pain.     Onset: 10+ years ago without inciting event or injury. Has been progressively worsening over time.   Location and Radiation: Midline lumbosacral junction. Radiates up the R lumbar region and into the R buttocks. Denies radicular pains  Provoking: Lifting, prolonged sitting, bending  Palliating: Walking  Quality: Constant, sharp, makes moving difficult  Severity: 4-5/10  Timing: Tends to be worse in the evenings, especially after days of heavy lifting or sitting all day  Numbness: None  Weakness: None    Patient denies red flag symptoms of corresponding bowel/bladder symptoms, fever/chills, saddle anesthesia, profound motor loss, weight loss, or sudden unremitting increase in pain.    He is a  and finds that by the end of the day his back is stiff and more painful.     Current pain medications include:  - Duloxetine, 30mg daily- helpful, it seems that he may have been starting to take this PRN.    Previous medication treatments included:  Methocarbamol  Lidocaine patches  PRN ibuprofen- was told to stop d/t stomach and renal SE    Other treatments have included:  Thomas Sandhu has not been seen at a pain clinic in the past.    PT: yes- not much help  Injections: None  Surgery: No  Alternative:  Acupuncture- 5-6 years ago; not helpful    Past Medical History:  Past Medical History:   Diagnosis Date    Back pain      Patient Active Problem List    Diagnosis Date Noted 2019 novel coronavirus disease (COVID-19) 07/28/2021     Priority: Medium     Diagnosed 2/2021. Sustained symptoms concerning for long-haul COVID-19 from 5/2021-7/2021. Adult Post-COVID referral placed, still pending.       Chronic bilateral low back pain without sciatica 10/17/2019     Priority: Medium    Acquired flat back syndrome 10/17/2019     Priority: Medium     due to work as       Cyst on ear 10/17/2019     Priority: Medium    Other constipation 10/17/2019     Priority: Medium    Subclinical hypothyroidism 10/17/2019     Priority: Medium       Past Surgical History:  Past Surgical History:   Procedure Laterality Date    ESOPHAGOSCOPY, GASTROSCOPY, DUODENOSCOPY (EGD), COMBINED N/A 11/1/2021    Procedure: ESOPHAGOGASTRODUODENOSCOPY, WITH BIOPSY;  Surgeon: Davy Mcguire MD;  [place] [facility] [place]     Medications:  Current Outpatient Medications   Medication Sig Dispense Refill    acetaminophen (TYLENOL) 325 MG tablet Take 3 tablets (975 mg) by mouth every 8 hours 30 tablet 0    albuterol (PROAIR HFA/PROVENTIL HFA/VENTOLIN HFA) 108 (90 Base) MCG/ACT inhaler Inhale 2 puffs into the lungs every 6 hours 18 g 0    budesonide-formoterol (SYMBICORT) 80-4.5 MCG/ACT Inhaler Inhale 2 puffs twice daily plus 1-2 puffs as needed. May use up to 12 puffs per day. 20.4 g 11    DULoxetine (CYMBALTA) 30 MG capsule Take 1 capsule (30 mg) by mouth daily for 90 days 90 capsule 0    ibuprofen (ADVIL/MOTRIN) 800 MG tablet Take 1 tablet (800 mg) by mouth every 8 hours as needed for moderate pain 90 tablet 1    Lidocaine (LIDOCARE) 4 % Patch Place 2 patches onto the skin every 24 hours To prevent lidocaine toxicity, patient should be patch free for 12 hrs daily. 20 patch 0    methocarbamol (ROBAXIN) 500 MG tablet Take 1 tablet (500 mg) by  mouth 4 times daily 30 tablet 0    omeprazole (PRILOSEC) 40 MG DR capsule Take 1 capsule (40 mg) by mouth 2 times daily (before meals) 60 capsule 3    pantoprazole (PROTONIX) 40 MG EC tablet Take 40 mg by mouth daily before breakfast       Allergies:   No Known Allergies    Family history:  No family history on file.    Physical Exam:  There were no vitals filed for this visit.  Exam:  Constitutional: Well developed, NAD  Head: normocephalic. Atraumatic.   Eyes: no redness or jaundice noted   CV: warm, well perfused extremities   Skin: no suspicious lesions or rashes   Psychiatric: mentation appears normal and affect     Lumbar Spine , SIJ & Hip Exam:    Gait: Non antalgic     Inspection:   Scar: None  Posture: slumped    Palpation:   There is TTP over the     Lumbar Spine ROM  Flexion (70-90 degree): WNL  Pain on return from flexion: yes   Extension (20-30 degree): WNL, pain w/ extension  Left lateral bend (25-35 degree): WNL  Right side bend (25-35 degree): WNL  Rotation left (30-40 degrees): WNL  Rotation right (30-40 degrees): WNL    Hip ROM:   IR in neutral WNL      Hip Specific Exam:                     Right          Left  Supine limb roll:                        No pain       No pain  GRADY:                                     negative     negative  FADIR:                                      negative     negative    SIJ provocation tests:                                Right            Left  Ramana finger test / SIJ tenderness:              positive     negative  GRADY:                                                       negative     negative    Nerve tension sign                   Right           Left  SLR/slump:                             negative     negative    Strength (in sitting)                 Right        Left  Hip Flexion (L1-2)                      5/5           5/5  Knee Extension (L3)                  5/5           5/5  Ankle Dorsiflexion (L4)               5/5           5/5  Ankle  Plantarflexion                   5/5           5/5  1st MTP Extension (L5)             5/5           5/5  Ankle Eversion (S1)                   5/5           5/5      Sensation:    Right lower limb: Sensation intact to light touch over all dermatomes  Left lower limb : Sensation intact to light touch over all dermatomes      Reflexes:                          Right        Left  Patellar                               1/4          1/4  Achilles                               1/4          1/4  Clonus                             Absent    Absent              Diagnostic tests:  MRI 1/7/23:  IMPRESSION:  1. No significant change since 02/18/2020. Stable multilevel lumbar spondylosis.  2. Small right subarticular disc protrusion at L5-S1 displaces and possibly impinges the descending right S1 nerve roots.  3. Small right central disc protrusion at L2-L3 contacts the descending right L3 nerve roots.  4. Small central disc protrusion at L4-L5 contacts both descending L5 nerve roots.  5. Mild to moderate spinal canal stenosis L2-L5.  6. Mild to moderate bilateral neural foraminal stenosis at L4-L5. Mild neural foraminal stenosis on the right at L2-L3 and bilaterally at L3-L4, L5-S1.    Personally reviewed imaging: yes    Assessment:  Chronic low back pain  Lumbar spondylosis  Myofascial pain    Thomas Sandhu is a 38 year old male who presents with chronic low back pain of 10+ years w/ insidious onset w/o inciting event or injury. 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 He has some TTP over the R lumbar paraspinal and paralumbar region as well as TTP over the b/l L4/5 and L5/S1 facet joints. He does have some pain w/ extension. MRI does show multilevel DDD and facet arthropathy along with mild disc bulges at multiple levels. He does not have any radicular components to his pain and there is no neurologic deficit or neurotension signs on exam;  there is no concern for radiculopathy or myelopathy today. His primary pain  seems to be related to lumbar spondylosis but largely myofascial pain. He has done PT intermittently over the years but does not seem to have maintained diligent with PT exercises, has largely engaged in passive treatments, and at times gets into a boom/bust cycle of overdoing his exercises to the point of pain exacerbation followed by periods of inactivity.     Plan:  Diagnosis reviewed, treatment option addressed, and risk/benefits discussed.     Procedures: Do not recommend any injection approaches today. In the future, may consider TPI to the R lumbar paraspinal region. If his pain continues to worsen, could also consider facet targeting treatments such as MBB/RFA.  Recommendations/follow-up for PCP:    Recommend PT referral to work on stretching, core strengthening, and development of a HEP he can continue to follow. Discussed with patient that he may be sore at times after PT sessions though if he is consistent with HEP that over time his pain should slowly improve.   Recommend increasing duloxetine dosing as tolerated; he already reports some response to 30mg dosing. Explained that duloxetine is not a PRN medication and he should take this continuously.   Other medication options could include cyclobenzaprine (5-10mg TID PRN) or methocarbamol (500-750mg TID PRN) which may be of benefit.   Follow up: as needed    Driss Helms MD  Pain Fellow Physician  [facility]        Physician Attestation   I, Ezequiel Pollock MD, saw and evaluated this patient and agree with the findings and plan of care as documented in the note.  Any changes have been made above.    Ezequiel Pollock MD  Interventional Pain Medicine  [facility]

## 2023-10-27 NOTE — LETTER
10/27/2023       RE: Thomas Sandhu  [address]  [place] [address]       Dear Colleague,    Thank you for referring your patient, Thomas Sandhu, to the [facility] [place] at [facility]. Please see a copy of my visit note below.                          [facility] Interventional Evaluation    Date of visit: 10/27/2023    Reason for consultation:    Thomas Sandhu is a 38 year old male who is seen in consultation today for evaluation of an interventional strategy for pain management.    PCP is Karen Agarwal.    Please see the [facility] health questionnaire which the patient completed and reviewed with me in detail.    Chief Complaint:    Chief Complaint   Patient presents with    Consult     INTERVENTIONAL EVAL, 4/10 pain in low back       Pain history:  Thomas Sandhu is a 38 year old male w/ a h/o chronic low back pain, presenting with a chief pain complaint of chronic low back pain.     Onset: 10+ years ago without inciting event or injury. Has been progressively worsening over time.   Location and Radiation: Midline lumbosacral junction. Radiates up the R lumbar region and into the R buttocks. Denies radicular pains  Provoking: Lifting, prolonged sitting, bending  Palliating: Walking  Quality: Constant, sharp, makes moving difficult  Severity: 4-5/10  Timing: Tends to be worse in the evenings, especially after days of heavy lifting or sitting all day  Numbness: None  Weakness: None    Patient denies red flag symptoms of corresponding bowel/bladder symptoms, fever/chills, saddle anesthesia, profound motor loss, weight loss, or sudden unremitting increase in pain.    He is a  and finds that by the end of the day his back is stiff and more painful.     Current pain medications include:  - Duloxetine, 30mg daily- helpful, it seems that he may  have been starting to take this PRN.    Previous medication treatments included:  Methocarbamol  Lidocaine patches  PRN ibuprofen- was told to stop d/t stomach and renal SE    Other treatments have included:  Thomas Sandhu has not been seen at a pain clinic in the past.    PT: yes- not much help  Injections: None  Surgery: No  Alternative: Acupuncture- 5-6 years ago; not helpful    Past Medical History:  Past Medical History:   Diagnosis Date    Back pain      Patient Active Problem List    Diagnosis Date Noted 2019 novel coronavirus disease (COVID-19) 07/28/2021     Priority: Medium     Diagnosed 2/2021. Sustained symptoms concerning for long-haul COVID-19 from 5/2021-7/2021. Adult Post-COVID referral placed, still pending.       Chronic bilateral low back pain without sciatica 10/17/2019     Priority: Medium    Acquired flat back syndrome 10/17/2019     Priority: Medium     due to work as       Cyst on ear 10/17/2019     Priority: Medium    Other constipation 10/17/2019     Priority: Medium    Subclinical hypothyroidism 10/17/2019     Priority: Medium       Past Surgical History:  Past Surgical History:   Procedure Laterality Date    ESOPHAGOSCOPY, GASTROSCOPY, DUODENOSCOPY (EGD), COMBINED N/A 11/1/2021    Procedure: ESOPHAGOGASTRODUODENOSCOPY, WITH BIOPSY;  Surgeon: Davy Mcguire MD;  [place] [facility] [place]     Medications:  Current Outpatient Medications   Medication Sig Dispense Refill    acetaminophen (TYLENOL) 325 MG tablet Take 3 tablets (975 mg) by mouth every 8 hours 30 tablet 0    albuterol (PROAIR HFA/PROVENTIL HFA/VENTOLIN HFA) 108 (90 Base) MCG/ACT inhaler Inhale 2 puffs into the lungs every 6 hours 18 g 0    budesonide-formoterol (SYMBICORT) 80-4.5 MCG/ACT Inhaler Inhale 2 puffs twice daily plus 1-2 puffs as needed. May use up to 12 puffs per day. 20.4 g 11    DULoxetine (CYMBALTA) 30 MG capsule Take 1 capsule (30 mg) by mouth daily for 90 days 90 capsule 0    ibuprofen  (ADVIL/MOTRIN) 800 MG tablet Take 1 tablet (800 mg) by mouth every 8 hours as needed for moderate pain 90 tablet 1    Lidocaine (LIDOCARE) 4 % Patch Place 2 patches onto the skin every 24 hours To prevent lidocaine toxicity, patient should be patch free for 12 hrs daily. 20 patch 0    methocarbamol (ROBAXIN) 500 MG tablet Take 1 tablet (500 mg) by mouth 4 times daily 30 tablet 0    omeprazole (PRILOSEC) 40 MG DR capsule Take 1 capsule (40 mg) by mouth 2 times daily (before meals) 60 capsule 3    pantoprazole (PROTONIX) 40 MG EC tablet Take 40 mg by mouth daily before breakfast       Allergies:   No Known Allergies    Family history:  No family history on file.    Physical Exam:  There were no vitals filed for this visit.  Exam:  Constitutional: Well developed, NAD  Head: normocephalic. Atraumatic.   Eyes: no redness or jaundice noted   CV: warm, well perfused extremities   Skin: no suspicious lesions or rashes   Psychiatric: mentation appears normal and affect     Lumbar Spine , SIJ & Hip Exam:    Gait: Non antalgic     Inspection:   Scar: None  Posture: slumped    Palpation:   There is TTP over the     Lumbar Spine ROM  Flexion (70-90 degree): WNL  Pain on return from flexion: yes   Extension (20-30 degree): WNL, pain w/ extension  Left lateral bend (25-35 degree): WNL  Right side bend (25-35 degree): WNL  Rotation left (30-40 degrees): WNL  Rotation right (30-40 degrees): WNL    Hip ROM:   IR in neutral WNL      Hip Specific Exam:                     Right          Left  Supine limb roll:                        No pain       No pain  GRADY:                                     negative     negative  FADIR:                                      negative     negative    SIJ provocation tests:                                Right            Left  Ramana finger test / SIJ tenderness:              positive     negative  GRADY:                                                       negative     negative    Nerve tension  sign                   Right           Left  SLR/slump:                             negative     negative    Strength (in sitting)                 Right        Left  Hip Flexion (L1-2)                      5/5           5/5  Knee Extension (L3)                  5/5           5/5  Ankle Dorsiflexion (L4)               5/5           5/5  Ankle Plantarflexion                   5/5           5/5  1st MTP Extension (L5)             5/5           5/5  Ankle Eversion (S1)                   5/5           5/5      Sensation:    Right lower limb: Sensation intact to light touch over all dermatomes  Left lower limb : Sensation intact to light touch over all dermatomes      Reflexes:                          Right        Left  Patellar                               1/4          1/4  Achilles                               1/4          1/4  Clonus                             Absent    Absent              Diagnostic tests:  MRI 1/7/23:  IMPRESSION:  1. No significant change since 02/18/2020. Stable multilevel lumbar spondylosis.  2. Small right subarticular disc protrusion at L5-S1 displaces and possibly impinges the descending right S1 nerve roots.  3. Small right central disc protrusion at L2-L3 contacts the descending right L3 nerve roots.  4. Small central disc protrusion at L4-L5 contacts both descending L5 nerve roots.  5. Mild to moderate spinal canal stenosis L2-L5.  6. Mild to moderate bilateral neural foraminal stenosis at L4-L5. Mild neural foraminal stenosis on the right at L2-L3 and bilaterally at L3-L4, L5-S1.    Personally reviewed imaging: yes    Assessment:  Chronic low back pain  Lumbar spondylosis  Myofascial pain    Thomas Sandhu is a 38 year old male who presents with chronic low back pain of 10+ years w/ insidious onset w/o inciting event or injury. 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 He has some TTP over the R lumbar  paraspinal and paralumbar region as well as TTP over the b/l L4/5 and L5/S1 facet joints. He does have some pain w/ extension. MRI does show multilevel DDD and facet arthropathy along with mild disc bulges at multiple levels. He does not have any radicular components to his pain and there is no neurologic deficit or neurotension signs on exam; there is no concern for radiculopathy or myelopathy today. His primary pain  seems to be related to lumbar spondylosis but largely myofascial pain. He has done PT intermittently over the years but does not seem to have maintained diligent with PT exercises, has largely engaged in passive treatments, and at times gets into a boom/bust cycle of overdoing his exercises to the point of pain exacerbation followed by periods of inactivity.     Plan:  Diagnosis reviewed, treatment option addressed, and risk/benefits discussed.     Procedures: Do not recommend any injection approaches today. In the future, may consider TPI to the R lumbar paraspinal region. If his pain continues to worsen, could also consider facet targeting treatments such as MBB/RFA.  Recommendations/follow-up for PCP:    Recommend PT referral to work on stretching, core strengthening, and development of a HEP he can continue to follow. Discussed with patient that he may be sore at times after PT sessions though if he is consistent with HEP that over time his pain should slowly improve.   Recommend increasing duloxetine dosing as tolerated; he already reports some response to 30mg dosing. Explained that duloxetine is not a PRN medication and he should take this continuously.   Other medication options could include cyclobenzaprine (5-10mg TID PRN) or methocarbamol (500-750mg TID PRN) which may be of benefit.   Follow up: as needed    Driss Helms MD  Pain Fellow Physician  [facility]        Physician Attestation   I, Ezequiel Pollock MD, saw and evaluated this patient and agree with the  findings and plan of care as documented in the note.  Any changes have been made above.        Again, thank you for allowing me to participate in the care of your patient.      Sincerely,    Ezequiel Pollock MD

## 2023-10-27 NOTE — NURSING NOTE
Patient presents with:  Consult: CHARLIE ZARATE, 4/10 pain in low back      Moderate Pain (4)     Pain Medications       Analgesics Other Refills Start End     acetaminophen (TYLENOL) 325 MG tablet    0 1/8/2023     Sig - Route: Take 3 tablets (975 mg) by mouth every 8 hours - Oral    Patient not taking: Reported on 10/27/2023       Class: E-Prescribe            What medications are you using for pain? Not sure on name but is now all out    (New patients only) Have you been seen by another pain clinic/ provider? Yasmin Joy, EMT

## 2023-10-27 NOTE — NURSING NOTE
RN reviewed AVS with patient. Patient to contact clinic if any questions/concerns. Patient verbalized understanding.    Odalys Johnson RNCC

## 2023-10-27 NOTE — PATIENT INSTRUCTIONS
Treatment planning:    Recommendations will be written in the providers note for your Primary Care provider (OR other providers in your care team) to review and make changes to your therapies based on their discretion.       Recommended Follow up:      Follow up as needed.       To speak with a nurse, schedule/reschedule/cancel a clinic appointment, or request a medication refill call: [phone].    You can also reach us by [employer]: [email or web address]

## 2024-03-12 ENCOUNTER — OFFICE VISIT (OUTPATIENT)
Dept: FAMILY MEDICINE | Facility: CLINIC | Age: 39
End: 2024-03-12
Payer: COMMERCIAL

## 2024-03-12 VITALS
RESPIRATION RATE: 19 BRPM | TEMPERATURE: 99.4 F | BODY MASS INDEX: 27.72 KG/M2 | WEIGHT: 216 LBS | SYSTOLIC BLOOD PRESSURE: 118 MMHG | DIASTOLIC BLOOD PRESSURE: 69 MMHG | HEIGHT: 74 IN | OXYGEN SATURATION: 97 % | HEART RATE: 80 BPM

## 2024-03-12 DIAGNOSIS — L02.411 ABSCESS OF AXILLA, RIGHT: Primary | ICD-10-CM

## 2024-03-12 DIAGNOSIS — L08.9 PUSTULE: ICD-10-CM

## 2024-03-12 PROCEDURE — 99213 OFFICE O/P EST LOW 20 MIN: CPT | Mod: GC

## 2024-03-12 RX ORDER — CEPHALEXIN 500 MG/1
500 CAPSULE ORAL 2 TIMES DAILY
Qty: 20 CAPSULE | Refills: 0 | Status: SHIPPED | OUTPATIENT
Start: 2024-03-12 | End: 2024-03-22

## 2024-03-12 RX ORDER — IBUPROFEN 600 MG/1
600 TABLET, FILM COATED ORAL EVERY 6 HOURS PRN
Qty: 30 TABLET | Refills: 0 | Status: SHIPPED | OUTPATIENT
Start: 2024-03-12 | End: 2024-04-09

## 2024-03-12 NOTE — PROGRESS NOTES
Preceptor Attestation:   Patient seen, evaluated and discussed with the resident. I have verified the content of the note, which accurately reflects my assessment of the patient and the plan of care.   Supervising Physician:  Olya Brothers MD

## 2024-03-12 NOTE — PROGRESS NOTES
"  Assessment & Plan     Abscess of axilla, right  Pustule  2 week history of progressing abscesses and pustules. Patient states that he return from  on 2/28 and while there he develop a whole body rash. He reports to have been given an unknown medication which improved his rashes. Physical examination remarkable for two ~3x2 cm abscess of RT axilla as well as 3x pustule along left lateral calf and 1x pustule RT calf. Patient decline photo to be taken. Physical examination suspicious for folliculitis. Will treat with keflex. Encourage warm compresses to armpit.   - cephALEXin (KEFLEX) 500 MG capsule  Dispense: 20 capsule; Refill: 0  - ibuprofen (ADVIL/MOTRIN) 600 MG tablet  Dispense: 30 tablet; Refill: 0      BMI  Estimated body mass index is 27.73 kg/m  as calculated from the following:    Height as of this encounter: 1.88 m (6' 2\").    Weight as of this encounter: 98 kg (216 lb).       Return if symptoms worsen or fail to improve.    Katiana Guzman is a 38 year old, presenting for the following health issues:  Derm Problem (Blister like all over the body. 2 weeks ago it started. Not itchy)        3/12/2024     3:47 PM   Additional Questions   Roomed by nellie   Accompanied by self         3/12/2024    Information    services provided? Yes   Language Citizen of [place]   Type of interpretation provided Telephone    Agency [facility]  Services     HPI    Blisters:  - Onset: 2 weeks   - Location: underneath RT armpit, LT leg and RT leg, LT hip.  - First notice on the left calf   - No fever, chills, no N/V/D   - Painful to touch   - Some has popped but no drainage   - Red and Swelling  - No change in shampoo, laundry detergent or soap bar  - No medication for pain.   - Recent travel: [place] and return on 2/28, had a rash with itchiness initially all over body. Took \"a pill\" in [place] and improved rashes       Review of Systems  Constitutional, HEENT, cardiovascular, " "pulmonary, gi and gu systems are negative, except as otherwise noted.      Objective    /69   Pulse 80   Temp 99.4  F (37.4  C) (Oral)   Resp 19   Ht 1.88 m (6' 2\")   Wt 98 kg (216 lb)   SpO2 97%   BMI 27.73 kg/m    Body mass index is 27.73 kg/m .  Physical Exam   GENERAL: alert and no distress  RESP: lungs clear to auscultation - no rales, rhonchi or wheezes  CV: regular rate and rhythm, normal S1 S2, no S3 or S4, no murmur, click or rub, no peripheral edema  ABDOMEN: soft, nontender, no hepatosplenomegaly, no masses and bowel sounds normal  SKIN: ~3x2 cm abscess of RT axilla (2x) as well as 3x 2mm pustule along the left lateral calf and 1x 2mm pustule RT calf surround by erythema and tender to touch.   NEURO: mentation intact and speech normal  PSYCH: mentation appears normal, affect normal/bright          Signed Electronically by: Ehsan French MD    "

## 2024-03-12 NOTE — PATIENT INSTRUCTIONS
Patient Education   Here is the plan from today's visit    1. Abscess of axilla, right, Pustule  Apply warm compressive to right armpit. Start antibiotic and take ibuprofen for pain.   - cephALEXin (KEFLEX) 500 MG capsule; Take 1 capsule (500 mg) by mouth 2 times daily for 10 days  Dispense: 20 capsule; Refill: 0  - ibuprofen (ADVIL/MOTRIN) 600 MG tablet; Take 1 tablet (600 mg) by mouth every 6 hours as needed for moderate pain  Dispense: 30 tablet; Refill: 0        Please call or return to clinic if your symptoms don't go away.    Follow up plan  Return if symptoms worsen or fail to improve.    Thank you for coming to [facility] today.  Lab Testing:  **If you had lab testing today and your results are reassuring or normal they will be mailed to you or sent through [employer] within 7 days.   **If the lab tests need quick action we will call you with the results.  **If you are having labs done on a different day, please call [phone] to schedule at [facility] or [phone] for other [facility] Outpatient Lab locations. Labs do not offer walk-in appointments.  The phone number we will call with results is # [phone] (home) . If this is not the best number please call our clinic and change the number.  Medication Refills:  If you need any refills please call your pharmacy and they will contact us.   If you need to  your refill at a new pharmacy, please contact the new pharmacy directly. The new pharmacy will help you get your medications transferred faster.   Scheduling:  If you have any concerns about today's visit or wish to schedule another appointment please call our office during normal business hours [phone] (8-5:00 M-F). If you can no longer make a scheduled visit, please cancel via [employer] or call us to cancel.   If a referral was made to an [facility] specialty provider and you do not get a call from central scheduling, please refer to directions on your visit summary or  call our office during normal business hours for assistance.   If a Mammogram was ordered for you at the Breast Center call [phone] to schedule or change your appointment.  If you had an XRay/CT/Ultrasound/MRI ordered the number is [phone] to schedule or change your radiology appointment.   [facility] has limited ultrasound appointments available on Wednesdays, if you would like your ultrasound at [facility], please call [phone] to schedule.   Medical Concerns:  If you have urgent medical concerns please call [phone] at any time of the day.    Ehsan French MD

## 2024-04-04 ENCOUNTER — OFFICE VISIT (OUTPATIENT)
Dept: FAMILY MEDICINE | Facility: CLINIC | Age: 39
End: 2024-04-04
Payer: COMMERCIAL

## 2024-04-04 VITALS
DIASTOLIC BLOOD PRESSURE: 70 MMHG | WEIGHT: 219.6 LBS | OXYGEN SATURATION: 97 % | HEIGHT: 74 IN | BODY MASS INDEX: 28.18 KG/M2 | SYSTOLIC BLOOD PRESSURE: 104 MMHG | TEMPERATURE: 98 F | HEART RATE: 76 BPM | RESPIRATION RATE: 17 BRPM

## 2024-04-04 DIAGNOSIS — Z13.1 SCREENING FOR DIABETES MELLITUS: ICD-10-CM

## 2024-04-04 DIAGNOSIS — Z00.00 ROUTINE GENERAL MEDICAL EXAMINATION AT A HEALTH CARE FACILITY: Primary | ICD-10-CM

## 2024-04-04 DIAGNOSIS — R23.8 ERYTHEMATOUS PAPULES OF SKIN: ICD-10-CM

## 2024-04-04 DIAGNOSIS — K21.9 GASTROESOPHAGEAL REFLUX DISEASE WITHOUT ESOPHAGITIS: ICD-10-CM

## 2024-04-04 DIAGNOSIS — Z23 ENCOUNTER FOR IMMUNIZATION: ICD-10-CM

## 2024-04-04 DIAGNOSIS — Z13.220 SCREENING FOR CHOLESTEROL LEVEL: ICD-10-CM

## 2024-04-04 LAB
ALBUMIN SERPL BCG-MCNC: 4 G/DL (ref 3.5–5.2)
ALP SERPL-CCNC: 74 U/L (ref 40–150)
ALT SERPL W P-5'-P-CCNC: 24 U/L (ref 0–70)
ANION GAP SERPL CALCULATED.3IONS-SCNC: 10 MMOL/L (ref 7–15)
AST SERPL W P-5'-P-CCNC: 22 U/L (ref 0–45)
BILIRUB SERPL-MCNC: 0.3 MG/DL
BUN SERPL-MCNC: 12.1 MG/DL (ref 6–20)
CALCIUM SERPL-MCNC: 9.1 MG/DL (ref 8.6–10)
CHLORIDE SERPL-SCNC: 104 MMOL/L (ref 98–107)
CHOLEST SERPL-MCNC: 188 MG/DL
CREAT SERPL-MCNC: 0.88 MG/DL (ref 0.67–1.17)
DEPRECATED HCO3 PLAS-SCNC: 26 MMOL/L (ref 22–29)
EGFRCR SERPLBLD CKD-EPI 2021: >90 ML/MIN/1.73M2
FASTING STATUS PATIENT QL REPORTED: YES
GLUCOSE SERPL-MCNC: 101 MG/DL (ref 70–99)
HBA1C MFR BLD: 5.8 % (ref 0–5.6)
HDLC SERPL-MCNC: 41 MG/DL
LDLC SERPL CALC-MCNC: 133 MG/DL
NONHDLC SERPL-MCNC: 147 MG/DL
POTASSIUM SERPL-SCNC: 4.1 MMOL/L (ref 3.4–5.3)
PROT SERPL-MCNC: 7.3 G/DL (ref 6.4–8.3)
SODIUM SERPL-SCNC: 140 MMOL/L (ref 135–145)
TRIGL SERPL-MCNC: 69 MG/DL

## 2024-04-04 PROCEDURE — 99395 PREV VISIT EST AGE 18-39: CPT | Mod: GC

## 2024-04-04 PROCEDURE — 80061 LIPID PANEL: CPT

## 2024-04-04 PROCEDURE — 80053 COMPREHEN METABOLIC PANEL: CPT

## 2024-04-04 PROCEDURE — 83036 HEMOGLOBIN GLYCOSYLATED A1C: CPT

## 2024-04-04 PROCEDURE — 99213 OFFICE O/P EST LOW 20 MIN: CPT | Mod: 25

## 2024-04-04 PROCEDURE — 36415 COLL VENOUS BLD VENIPUNCTURE: CPT

## 2024-04-04 RX ORDER — PANTOPRAZOLE SODIUM 40 MG/1
40 TABLET, DELAYED RELEASE ORAL
Qty: 30 TABLET | Refills: 2 | Status: SHIPPED | OUTPATIENT
Start: 2024-04-04

## 2024-04-04 RX ORDER — SULFAMETHOXAZOLE/TRIMETHOPRIM 800-160 MG
1 TABLET ORAL 2 TIMES DAILY
Qty: 10 TABLET | Refills: 0 | Status: SHIPPED | OUTPATIENT
Start: 2024-04-04 | End: 2024-04-09

## 2024-04-04 SDOH — HEALTH STABILITY: PHYSICAL HEALTH: ON AVERAGE, HOW MANY DAYS PER WEEK DO YOU ENGAGE IN MODERATE TO STRENUOUS EXERCISE (LIKE A BRISK WALK)?: 5 DAYS

## 2024-04-04 SDOH — HEALTH STABILITY: PHYSICAL HEALTH: ON AVERAGE, HOW MANY MINUTES DO YOU ENGAGE IN EXERCISE AT THIS LEVEL?: 20 MIN

## 2024-04-04 ASSESSMENT — SOCIAL DETERMINANTS OF HEALTH (SDOH): HOW OFTEN DO YOU GET TOGETHER WITH FRIENDS OR RELATIVES?: TWICE A WEEK

## 2024-04-04 NOTE — PROGRESS NOTES
"Preventive Care Visit  [facility]  Apr 4, 2024      Assessment & Plan     Routine general medical examination at a health care facility  Patient prevents to the clinic today for a routine physical examination. Preventative screen and immunizations discussed below.     Screening for diabetes mellitus  Most recent A1c 10/03/22 of 5.5. Will repeat A1c and CMP today.   - Hemoglobin A1c  - Comprehensive metabolic panel  - Hemoglobin A1c  - Comprehensive metabolic panel    Screening for cholesterol level  Most recent Lipid screen on 2/14/23 notable for elevated LDL cholesterol at 120. Will repeat labs today. Encourage regular exercise 30-45 minutes of moderate activity 3-4 times per week. Recommend eating a healthy, balanced, low-sugar, low-carbohydrate diet.   - Lipid Profile  - Lipid Profile    Gastroesophageal reflux disease without esophagitis  Refills provided.  - pantoprazole (PROTONIX) 40 MG EC tablet  Dispense: 30 tablet; Refill: 2    Erythematous papules of skin  Previously treated with keflex with symptoms resolution. Patient reports a new papule resurface a couple days ago (see photos below). No reports of fever, chills, N/V/D, joint pain, or drainage. Will treat with Bactrim for 5 days covering for MRSA. Will refer to dermatology for further evaluation.   - sulfamethoxazole-trimethoprim (BACTRIM DS) 800-160 MG tablet  Dispense: 10 tablet; Refill: 0  - Adult Dermatology  Referral    Encounter for immunization  Discussed immunization with the patient. Patient declines immunization today.     Patient has been advised of split billing requirements and indicates understanding: Yes        BMI  Estimated body mass index is 28.49 kg/m  as calculated from the following:    Height as of this encounter: 1.87 m (6' 1.62\").    Weight as of this encounter: 99.6 kg (219 lb 9.6 oz).       Counseling  Appropriate preventive services were discussed with this " "patient, including applicable screening as appropriate for fall prevention, nutrition, physical activity, Tobacco-use cessation, weight loss and cognition.  Checklist reviewing preventive services available has been given to the patient.  Reviewed patient's diet, addressing concerns and/or questions.   The patient was instructed to see the dentist every 6 months.     Return in about 53 weeks (around 4/10/2025) for Annual Wellness Visit.    Katiana Guzman is a 38 year old, presenting for the following:  Physical (Concern about bumps on leg.)        4/4/2024    10:22 AM   Additional Questions   Roomed by Doua   Accompanied by Self         4/4/2024    10:22 AM   Patient Reported Additional Medications   Patient reports taking the following new medications n/a         4/4/2024    Information    services provided? Yes   Language Martiniquais   Type of interpretation provided Face-to-face    name Sabrina    Agency [employer]        Health Care Directive  Patient does not have a Health Care Directive or Living Will: Discussed advance care planning with patient; information given to patient to review.    HPI      Preventative screening:   - Would like to discuss needed screening    History of acid reflux:   - request for refills today.   - medication was helpful for heart burn in the past    Immunization:   - Due for Pneum, hep b, TDAP, influ, and covid; declines today.     STI screening  - No Concerns or symptoms  - One partner    Meds rec:  - No history of asthma, no longer taking albuterol or Symbicort    Skin:   - prior \"bumps\" had resolved from last visit with antibiotic   - Reports a new \"bump\" reappeared on left leg a couple of day ago  - tender to touch and red  - No fever, chills, GI symptoms, or joint pain  - No drainage   - No other \"bump\" anywhere else on the body.           4/4/2024   General Health   How would you rate your overall physical health? Good   Feel stress " (tense, anxious, or unable to sleep) Not at all         4/4/2024   Nutrition   Three or more servings of calcium each day? Yes   Diet: Regular (no restrictions)   How many servings of fruit and vegetables per day? (!) 2-3   How many sweetened beverages each day? 0-1         4/4/2024   Exercise   Days per week of moderate/strenous exercise 5 days   Average minutes spent exercising at this level 20 min         4/4/2024   Social Factors   Frequency of gathering with friends or relatives Twice a week   Worry food won't last until get money to buy more No   Food not last or not have enough money for food? No   Do you have housing?  Yes   Are you worried about losing your housing? No   Lack of transportation? No   Unable to get utilities (heat,electricity)? No         4/4/2024   Dental   Dentist two times every year? (!) NO         4/4/2024   TB Screening   Were you born outside of the [place]? Yes           Today's PHQ-2 Score:       7/11/2023    11:14 AM   PHQ-2 ( 1999 [employer])   Q1: Little interest or pleasure in doing things 0   Q2: Feeling down, depressed or hopeless 0   PHQ-2 Score 0         4/4/2024   Substance Use   Alcohol more than 3/day or more than 7/wk Not Applicable   Do you use any other substances recreationally? No     Social History     Tobacco Use    Smoking status: Never    Smokeless tobacco: Never   Substance Use Topics    Alcohol use: No    Drug use: No           4/4/2024   STI Screening   New sexual partner(s) since last STI/HIV test? No         4/4/2024   Contraception/Family Planning   Questions about contraception or family planning No        Reviewed and updated as needed this visit by Provider                    Past Medical History:   Diagnosis Date    Back pain      Past Surgical History:   Procedure Laterality Date    ESOPHAGOSCOPY, GASTROSCOPY, DUODENOSCOPY (EGD), COMBINED N/A 11/1/2021    Procedure: ESOPHAGOGASTRODUODENOSCOPY, WITH BIOPSY;  Surgeon: Davy Mcguire MD;  Location: [facility]  "OR         Review of Systems  Constitutional, HEENT, cardiovascular, pulmonary, gi and gu systems are negative, except as otherwise noted.     Objective    Exam  /70 (BP Location: Left arm, Patient Position: Sitting, Cuff Size: Adult Large)   Pulse 76   Temp 98  F (36.7  C) (Oral)   Resp 17   Ht 1.87 m (6' 1.62\")   Wt 99.6 kg (219 lb 9.6 oz)   SpO2 97%   BMI 28.49 kg/m     Estimated body mass index is 28.49 kg/m  as calculated from the following:    Height as of this encounter: 1.87 m (6' 1.62\").    Weight as of this encounter: 99.6 kg (219 lb 9.6 oz).    Physical Exam  GENERAL: alert and no distress  EYES: Eyes grossly normal to inspection, PERRL and conjunctivae and sclerae normal  HENT: ear canals and TM's normal, nose and mouth without ulcers or lesions  NECK: no adenopathy, no asymmetry, masses, or scars  RESP: lungs clear to auscultation - no rales, rhonchi or wheezes  CV: regular rate and rhythm, normal S1 S2, no S3 or S4, no murmur, click or rub, no peripheral edema  ABDOMEN: soft, nontender, no hepatosplenomegaly, no masses and bowel sounds normal  MS: no gross musculoskeletal defects noted, no edema  SKIN: Papule noted along left lower leg (see photo below). Erythematous and tender to palpation.   NEURO: Normal strength and tone, mentation intact and speech normal  PSYCH: mentation appears normal, affect normal/bright            Signed Electronically by: Ehsan French MD    "

## 2024-04-04 NOTE — LETTER
April 5, 2024      Thomas Sandhu  [address]   [place] [address]        Dear ,    We are writing to inform you of your test results.    Your A1c and cholesterol test results are slightly elevated from prior labs. Your A1c is currently in the prediabetes ranges. I recommend with work on lifestyle modification with 30-45 minutes of moderate activity 3-4 times per week. In addition, continue working on eating a healthy, balanced, low-sugar, and low-carbohydrate diet. We'll continue to monitor with repeat labs in a 6-12 months.     Resulted Orders   Hemoglobin A1c   Result Value Ref Range    Hemoglobin A1C 5.8 (H) 0.0 - 5.6 %      Comment:      Normal <5.7%   Prediabetes 5.7-6.4%    Diabetes 6.5% or higher     Note: Adopted from ADA consensus guidelines.   Comprehensive metabolic panel   Result Value Ref Range    Sodium 140 135 - 145 mmol/L      Comment:      Reference intervals for this test were updated on 09/26/2023 to more accurately reflect our healthy population. There may be differences in the flagging of prior results with similar values performed with this method. Interpretation of those prior results can be made in the context of the updated reference intervals.     Potassium 4.1 3.4 - 5.3 mmol/L    Carbon Dioxide (CO2) 26 22 - 29 mmol/L    Anion Gap 10 7 - 15 mmol/L    Urea Nitrogen 12.1 6.0 - 20.0 mg/dL    Creatinine 0.88 0.67 - 1.17 mg/dL    GFR Estimate >90 >60 mL/min/1.73m2    Calcium 9.1 8.6 - 10.0 mg/dL    Chloride 104 98 - 107 mmol/L    Glucose 101 (H) 70 - 99 mg/dL    Alkaline Phosphatase 74 40 - 150 U/L      Comment:      Reference intervals for this test were updated on 11/14/2023 to more accurately reflect our healthy population. There may be differences in the flagging of prior results with similar values performed with this method. Interpretation of those prior results can be made in the context of the updated reference intervals.    AST 22 0 - 45 U/L      Comment:       Reference intervals for this test were updated on 6/12/2023 to more accurately reflect our healthy population. There may be differences in the flagging of prior results with similar values performed with this method. Interpretation of those prior results can be made in the context of the updated reference intervals.    ALT 24 0 - 70 U/L      Comment:      Reference intervals for this test were updated on 6/12/2023 to more accurately reflect our healthy population. There may be differences in the flagging of prior results with similar values performed with this method. Interpretation of those prior results can be made in the context of the updated reference intervals.      Protein Total 7.3 6.4 - 8.3 g/dL    Albumin 4.0 3.5 - 5.2 g/dL    Bilirubin Total 0.3 <=1.2 mg/dL   Lipid Profile   Result Value Ref Range    Cholesterol 188 <200 mg/dL    Triglycerides 69 <150 mg/dL    Direct Measure HDL 41 >=40 mg/dL    LDL Cholesterol Calculated 133 (H) <=100 mg/dL    Non HDL Cholesterol 147 (H) <130 mg/dL    Patient Fasting > 8hrs? Yes     Narrative    Cholesterol  Desirable:  <200 mg/dL    Triglycerides  Normal:  Less than 150 mg/dL  Borderline High:  150-199 mg/dL  High:  200-499 mg/dL  Very High:  Greater than or equal to 500 mg/dL    Direct Measure HDL  Female:  Greater than or equal to 50 mg/dL   Male:  Greater than or equal to 40 mg/dL    LDL Cholesterol  Desirable:  <100mg/dL  Above Desirable:  100-129 mg/dL   Borderline High:  130-159 mg/dL   High:  160-189 mg/dL   Very High:  >= 190 mg/dL    Non HDL Cholesterol  Desirable:  130 mg/dL  Above Desirable:  130-159 mg/dL  Borderline High:  160-189 mg/dL  High:  190-219 mg/dL  Very High:  Greater than or equal to 220 mg/dL       If you have any questions or concerns, please call the clinic at the number listed above.       Sincerely,          Ehsan French MD

## 2024-04-04 NOTE — PATIENT INSTRUCTIONS
Patient Education   Here is the plan from today's visit    1. Routine general medical examination at a health care facility    2. Screening for diabetes mellitus  - Hemoglobin A1c; Future  - Comprehensive metabolic panel; Future    3. Screening for cholesterol level  - Lipid Profile; Future    4. Gastroesophageal reflux disease without esophagitis  - pantoprazole (PROTONIX) 40 MG EC tablet; Take 1 tablet (40 mg) by mouth daily before breakfast  Dispense: 30 tablet; Refill: 2    5. Erythematous papules of skin  - sulfamethoxazole-trimethoprim (BACTRIM DS) 800-160 MG tablet; Take 1 tablet by mouth 2 times daily for 5 days  Dispense: 10 tablet; Refill: 0  - Adult Dermatology  Referral; Future          Please call or return to clinic if your symptoms don't go away.    Follow up plan  Return in about 53 weeks (around 4/10/2025) for Annual Wellness Visit.    Thank you for coming to [facility] today.  Lab Testing:  **If you had lab testing today and your results are reassuring or normal they will be mailed to you or sent through [employer] within 7 days.   **If the lab tests need quick action we will call you with the results.  **If you are having labs done on a different day, please call [phone] to schedule at [facility] or [phone] for other [facility] Outpatient Lab locations. Labs do not offer walk-in appointments.  The phone number we will call with results is # [phone] (home) . If this is not the best number please call our clinic and change the number.  Medication Refills:  If you need any refills please call your pharmacy and they will contact us.   If you need to  your refill at a new pharmacy, please contact the new pharmacy directly. The new pharmacy will help you get your medications transferred faster.   Scheduling:  If you have any concerns about today's visit or wish to schedule another appointment please call our office during normal business hours [phone]  (8-5:00 M-F). If you can no longer make a scheduled visit, please cancel via [employer] or call us to cancel.   If a referral was made to an [facility] specialty provider and you do not get a call from central scheduling, please refer to directions on your visit summary or call our office during normal business hours for assistance.   If a Mammogram was ordered for you at the Breast Center call [phone] to schedule or change your appointment.  If you had an XRay/CT/Ultrasound/MRI ordered the number is [phone] to schedule or change your radiology appointment.   [facility] has limited ultrasound appointments available on Wednesdays, if you would like your ultrasound at [facility], please call [phone] to schedule.   Medical Concerns:  If you have urgent medical concerns please call [phone] at any time of the day.    Ehsan French MD        Preventive Care Advice   This is general advice given by our system to help you stay healthy. However, your care team may have specific advice just for you. Please talk to your care team about your preventive care needs.  Nutrition  Eat 5 or more servings of fruits and vegetables each day.  Try wheat bread, brown rice and whole grain pasta (instead of white bread, rice, and pasta).  Get enough calcium and vitamin D. Check the label on foods and aim for 100% of the RDA (recommended daily allowance).  Lifestyle  Exercise at least 150 minutes each week   (30 minutes a day, 5 days a week).  Do muscle strengthening activities 2 days a week. These help control your weight and prevent disease.  No smoking.  Wear sunscreen to prevent skin cancer.  Have a dental exam and cleaning every 6 months.  Yearly exams  See your health care team every year to talk about:  Any changes in your health.  Any medicines your care team has prescribed.  Preventive care, family planning, and ways to prevent chronic diseases.  Shots (vaccines)   HPV shots (up to age 26), if  you've never had them before.  Hepatitis B shots (up to age 59), if you've never had them before.  COVID-19 shot: Get this shot when it's due.  Flu shot: Get a flu shot every year.  Tetanus shot: Get a tetanus shot every 10 years.  Pneumococcal, hepatitis A, and RSV shots: Ask your care team if you need these based on your risk.  Shingles shot (for age 50 and up).  General health tests  Diabetes screening:  Starting at age 35, Get screened for diabetes at least every 3 years.  If you are younger than age 35, ask your care team if you should be screened for diabetes.  Cholesterol test: At age 39, start having a cholesterol test every 5 years, or more often if advised.  Bone density scan (DEXA): At age 50, ask your care team if you should have this scan for osteoporosis (brittle bones).  Hepatitis C: Get tested at least once in your life.  STIs (sexually transmitted infections)  Before age 24: Ask your care team if you should be screened for STIs.  After age 24: Get screened for STIs if you're at risk. You are at risk for STIs (including HIV) if:  You are sexually active with more than one person.  You don't use condoms every time.  You or a partner was diagnosed with a sexually transmitted infection.  If you are at risk for HIV, ask about PrEP medicine to prevent HIV.  Get tested for HIV at least once in your life, whether you are at risk for HIV or not.  Cancer screening tests  Cervical cancer screening: If you have a cervix, begin getting regular cervical cancer screening tests at age 21. Most people who have regular screenings with normal results can stop after age 65. Talk about this with your provider.  Breast cancer scan (mammogram): If you've ever had breasts, begin having regular mammograms starting at age 40. This is a scan to check for breast cancer.  Colon cancer screening: It is important to start screening for colon cancer at age 45.  Have a colonoscopy test every 10 years (or more often if you're at  risk) Or, ask your provider about stool tests like a FIT test every year or Cologuard test every 3 years.  To learn more about your testing options, visit: https://www.barter.li/854791.pdf.  For help making a decision, visit: https://bit.[employer]/ie29033.  Prostate cancer screening test: If you have a prostate and are age 55 to 69, ask your provider if you would benefit from a yearly prostate cancer screening test.  Lung cancer screening: If you are a current or former smoker age 50 to 80, ask your care team if ongoing lung cancer screenings are right for you.  For informational purposes only. Not to replace the advice of your health care provider. Copyright   2023 [facility]. All rights reserved. Clinically reviewed by the [facility] Transitions Program. [employer] 190999 - REV 01/24.

## 2024-04-04 NOTE — PROGRESS NOTES
Preceptor Attestation:    I discussed the patient with the resident and evaluated the patient in person. I have verified the content of the note, which accurately reflects my assessment of the patient and the plan of care.   Supervising Physician:  Romario Baeza MD, MD.

## 2024-04-08 DIAGNOSIS — L08.9 PUSTULE: ICD-10-CM

## 2024-04-08 DIAGNOSIS — L02.411 ABSCESS OF AXILLA, RIGHT: ICD-10-CM

## 2024-04-09 RX ORDER — IBUPROFEN 600 MG/1
600 TABLET, FILM COATED ORAL EVERY 6 HOURS PRN
Qty: 90 TABLET | Refills: 3 | Status: SHIPPED | OUTPATIENT
Start: 2024-04-09

## 2024-04-09 NOTE — TELEPHONE ENCOUNTER
"Request for medication refill:  ibuprofen (ADVIL/MOTRIN) 600 MG tablet     Providers if patient needs an appointment and you are willing to give a one month supply please refill for one month and  send a letter/MyChart using \".SMILLIMITEDREFILL\" .smillimited and route chart to \"P [facility] \" (Giving one month refill in non controlled medications is strongly recommended before denial)    If refill has been denied, meaning absolutely no refills without visit, please complete the smart phrase \".smirxrefuse\" and route it to the \"P [facility] MED REFILLS\"  pool to inform the patient and the pharmacy.    Yeni Hopper, CMA      "

## 2024-04-25 ENCOUNTER — OFFICE VISIT (OUTPATIENT)
Dept: FAMILY MEDICINE | Facility: CLINIC | Age: 39
End: 2024-04-25
Payer: COMMERCIAL

## 2024-04-25 VITALS
RESPIRATION RATE: 16 BRPM | HEART RATE: 80 BPM | DIASTOLIC BLOOD PRESSURE: 69 MMHG | TEMPERATURE: 97.7 F | SYSTOLIC BLOOD PRESSURE: 108 MMHG | OXYGEN SATURATION: 99 % | WEIGHT: 221.7 LBS | BODY MASS INDEX: 28.45 KG/M2 | HEIGHT: 74 IN

## 2024-04-25 DIAGNOSIS — L02.411 ABSCESS OF AXILLA, RIGHT: Primary | ICD-10-CM

## 2024-04-25 PROCEDURE — 99213 OFFICE O/P EST LOW 20 MIN: CPT | Performed by: FAMILY MEDICINE

## 2024-04-25 RX ORDER — CHLORHEXIDINE GLUCONATE 40 MG/ML
SOLUTION TOPICAL DAILY
Qty: 118 ML | Refills: 0 | Status: SHIPPED | OUTPATIENT
Start: 2024-04-25 | End: 2024-04-30

## 2024-04-25 RX ORDER — CEPHALEXIN 500 MG/1
500 CAPSULE ORAL 4 TIMES DAILY
Qty: 28 CAPSULE | Refills: 0 | Status: SHIPPED | OUTPATIENT
Start: 2024-04-25 | End: 2024-05-02

## 2024-04-25 ASSESSMENT — ASTHMA QUESTIONNAIRES
QUESTION_1 LAST FOUR WEEKS HOW MUCH OF THE TIME DID YOUR ASTHMA KEEP YOU FROM GETTING AS MUCH DONE AT WORK, SCHOOL OR AT HOME: NONE OF THE TIME
ACT_TOTALSCORE: 25
QUESTION_5 LAST FOUR WEEKS HOW WOULD YOU RATE YOUR ASTHMA CONTROL: COMPLETELY CONTROLLED
QUESTION_3 LAST FOUR WEEKS HOW OFTEN DID YOUR ASTHMA SYMPTOMS (WHEEZING, COUGHING, SHORTNESS OF BREATH, CHEST TIGHTNESS OR PAIN) WAKE YOU UP AT NIGHT OR EARLIER THAN USUAL IN THE MORNING: NOT AT ALL
ACT_TOTALSCORE: 25
QUESTION_4 LAST FOUR WEEKS HOW OFTEN HAVE YOU USED YOUR RESCUE INHALER OR NEBULIZER MEDICATION (SUCH AS ALBUTEROL): NOT AT ALL
QUESTION_2 LAST FOUR WEEKS HOW OFTEN HAVE YOU HAD SHORTNESS OF BREATH: NOT AT ALL

## 2024-04-25 NOTE — PROGRESS NOTES
"  Assessment & Plan     Abscess of axilla, right  Patient present with 2 months of intermittent and recurrent small abscesses in axilla and on shin.   Considered HS, but seems less likely with a shin lesion.   Tells me he improved with Keflex but not with Bactrim.   Has derm appointment for August.   No drainable lesions today. No lesions to culture today.   Will start Keflex again for current developing abscess in axilla.   Discussed skin hygiene and keeps adequate skin barrier as he notes having dry skin and isn't using moisturizer.   Given recurrence in multiple body areas, will try using hibiclens daily for 5 days to see if this helps with decolonization.   Discussed next step would be I&D and/or wound culture which we can do if any abscesses become larger or start draining.     - chlorhexidine (HIBICLENS) 4 % solution  Dispense: 118 mL; Refill: 0  - cephALEXin (KEFLEX) 500 MG capsule  Dispense: 28 capsule; Refill: 0      Diagnosis or treatment significantly limited by social determinants of health - English is not first language which affects how patient interacts with healthcare system.       BMI  Estimated body mass index is 28.46 kg/m  as calculated from the following:    Height as of this encounter: 1.88 m (6' 2\").    Weight as of this encounter: 100.6 kg (221 lb 11.2 oz).             Return in about 1 week (around 5/2/2024), or if symptoms worsen or fail to improve.    Katiana Guzman is a 38 year old, presenting for the following health issues:  RECHECK (Abscess on under  right arm for almost two months)      4/25/2024     8:08 AM   Additional Questions   Roomed by Chayito         4/25/2024    Information    services provided? Yes   Language [place]   Type of interpretation provided Telephone    name [facility]    Agency [facility]  Services     HPI                 2 months of intermittent skin rash   Notes multiple intermittent abscesses of skin " "- axilla, shin.   Was referred to dermatology, but appt isn't scheduled until August.     Also has nodule on L ear lobe; present for ears. Wondering about excision.           Objective    /69 (BP Location: Left arm, Patient Position: Sitting, Cuff Size: Adult Large)   Pulse 80   Temp 97.7  F (36.5  C) (Oral)   Resp 16   Ht 1.88 m (6' 2\")   Wt 100.6 kg (221 lb 11.2 oz)   SpO2 99%   BMI 28.46 kg/m    Body mass index is 28.46 kg/m .  Physical Exam  Constitutional:       Appearance: He is well-developed.   HENT:      Head: Normocephalic and atraumatic.   Eyes:      Conjunctiva/sclera: Conjunctivae normal.   Pulmonary:      Effort: Pulmonary effort is normal.   Musculoskeletal:         General: Normal range of motion.      Cervical back: Normal range of motion and neck supple.      Comments: 1 cm scabbed lesion with hyperpigmentation on L shin.   1 cm abscess that is tender to palpation in the R axilla without overlying erythema.     Palpable nontender subcentimeter nodule in the L ear lobe.    Skin:     General: Skin is warm and dry.   Neurological:      Mental Status: He is alert and oriented to person, place, and time.   Psychiatric:         Behavior: Behavior normal.         Thought Content: Thought content normal.         Judgment: Judgment normal.                    Signed Electronically by: Stephenie Hassan DO    "

## 2024-04-25 NOTE — PATIENT INSTRUCTIONS
1) Use moisturizing lotion daily to help protect skin.   2) Wash with hibiclens once a day for 5 days. Do not use on open skin, head, face or genitals. Do not use longer than 5 days.   3) Take Keflex 4 times a day for 7 days for current infection.   4) May use warm compresses 2-4 times a day as needed to the current abscess in your armpit.     Return if not improving in 1 week.     Stephenie Hassan, DO

## 2025-01-27 ENCOUNTER — OFFICE VISIT (OUTPATIENT)
Dept: FAMILY MEDICINE | Facility: CLINIC | Age: 40
End: 2025-01-27
Payer: COMMERCIAL

## 2025-01-27 VITALS
DIASTOLIC BLOOD PRESSURE: 78 MMHG | WEIGHT: 216 LBS | OXYGEN SATURATION: 99 % | BODY MASS INDEX: 27.72 KG/M2 | RESPIRATION RATE: 16 BRPM | SYSTOLIC BLOOD PRESSURE: 112 MMHG | HEART RATE: 63 BPM | HEIGHT: 74 IN | TEMPERATURE: 98 F

## 2025-01-27 DIAGNOSIS — M54.50 CHRONIC BILATERAL LOW BACK PAIN WITHOUT SCIATICA: ICD-10-CM

## 2025-01-27 DIAGNOSIS — G89.29 CHRONIC BILATERAL LOW BACK PAIN WITHOUT SCIATICA: ICD-10-CM

## 2025-01-27 DIAGNOSIS — E03.8 SUBCLINICAL HYPOTHYROIDISM: ICD-10-CM

## 2025-01-27 DIAGNOSIS — E55.9 VITAMIN D DEFICIENCY: ICD-10-CM

## 2025-01-27 DIAGNOSIS — K21.9 GASTROESOPHAGEAL REFLUX DISEASE WITHOUT ESOPHAGITIS: ICD-10-CM

## 2025-01-27 DIAGNOSIS — L20.9 ATOPIC DERMATITIS, UNSPECIFIED TYPE: Primary | ICD-10-CM

## 2025-01-27 DIAGNOSIS — E78.2 MIXED HYPERLIPIDEMIA: ICD-10-CM

## 2025-01-27 DIAGNOSIS — L29.9 PRURITIC DISORDER: ICD-10-CM

## 2025-01-27 DIAGNOSIS — J30.2 SEASONAL ALLERGIC RHINITIS, UNSPECIFIED TRIGGER: ICD-10-CM

## 2025-01-27 DIAGNOSIS — R73.03 PREDIABETES: ICD-10-CM

## 2025-01-27 LAB
ALBUMIN SERPL BCG-MCNC: 4.1 G/DL (ref 3.5–5.2)
ALP SERPL-CCNC: 70 U/L (ref 40–150)
ALT SERPL W P-5'-P-CCNC: 25 U/L (ref 0–70)
ANION GAP SERPL CALCULATED.3IONS-SCNC: 8 MMOL/L (ref 7–15)
AST SERPL W P-5'-P-CCNC: 23 U/L (ref 0–45)
BASOPHILS # BLD AUTO: 0 10E3/UL (ref 0–0.2)
BASOPHILS NFR BLD AUTO: 1 %
BILIRUB SERPL-MCNC: 0.5 MG/DL
BUN SERPL-MCNC: 9.3 MG/DL (ref 6–20)
CALCIUM SERPL-MCNC: 9.4 MG/DL (ref 8.8–10.4)
CHLORIDE SERPL-SCNC: 105 MMOL/L (ref 98–107)
CHOLEST SERPL-MCNC: 208 MG/DL
CREAT SERPL-MCNC: 0.87 MG/DL (ref 0.67–1.17)
EGFRCR SERPLBLD CKD-EPI 2021: >90 ML/MIN/1.73M2
EOSINOPHIL # BLD AUTO: 0.1 10E3/UL (ref 0–0.7)
EOSINOPHIL NFR BLD AUTO: 4 %
ERYTHROCYTE [DISTWIDTH] IN BLOOD BY AUTOMATED COUNT: 14.6 % (ref 10–15)
EST. AVERAGE GLUCOSE BLD GHB EST-MCNC: 128 MG/DL
FASTING STATUS PATIENT QL REPORTED: YES
FASTING STATUS PATIENT QL REPORTED: YES
GLUCOSE SERPL-MCNC: 89 MG/DL (ref 70–99)
HBA1C MFR BLD: 6.1 % (ref 0–5.6)
HCO3 SERPL-SCNC: 28 MMOL/L (ref 22–29)
HCT VFR BLD AUTO: 47.9 % (ref 40–53)
HDLC SERPL-MCNC: 46 MG/DL
HGB BLD-MCNC: 15.6 G/DL (ref 13.3–17.7)
IMM GRANULOCYTES # BLD: 0 10E3/UL
IMM GRANULOCYTES NFR BLD: 0 %
LDLC SERPL CALC-MCNC: 146 MG/DL
LYMPHOCYTES # BLD AUTO: 1.8 10E3/UL (ref 0.8–5.3)
LYMPHOCYTES NFR BLD AUTO: 53 %
MCH RBC QN AUTO: 28.6 PG (ref 26.5–33)
MCHC RBC AUTO-ENTMCNC: 32.6 G/DL (ref 31.5–36.5)
MCV RBC AUTO: 88 FL (ref 78–100)
MONOCYTES # BLD AUTO: 0.5 10E3/UL (ref 0–1.3)
MONOCYTES NFR BLD AUTO: 14 %
NEUTROPHILS # BLD AUTO: 1 10E3/UL (ref 1.6–8.3)
NEUTROPHILS NFR BLD AUTO: 28 %
NONHDLC SERPL-MCNC: 162 MG/DL
NRBC # BLD AUTO: 0 10E3/UL
NRBC BLD AUTO-RTO: 0 /100
PLATELET # BLD AUTO: 239 10E3/UL (ref 150–450)
POTASSIUM SERPL-SCNC: 3.9 MMOL/L (ref 3.4–5.3)
PROT SERPL-MCNC: 7.3 G/DL (ref 6.4–8.3)
RBC # BLD AUTO: 5.45 10E6/UL (ref 4.4–5.9)
SODIUM SERPL-SCNC: 141 MMOL/L (ref 135–145)
TRIGL SERPL-MCNC: 79 MG/DL
TSH SERPL DL<=0.005 MIU/L-ACNC: 3.94 UIU/ML (ref 0.3–4.2)
VIT D+METAB SERPL-MCNC: 17 NG/ML (ref 20–50)
WBC # BLD AUTO: 3.4 10E3/UL (ref 4–11)

## 2025-01-27 RX ORDER — FEXOFENADINE HCL 180 MG/1
180 TABLET ORAL DAILY
Qty: 90 TABLET | Refills: 0 | Status: SHIPPED | OUTPATIENT
Start: 2025-01-27

## 2025-01-27 RX ORDER — PANTOPRAZOLE SODIUM 40 MG/1
40 TABLET, DELAYED RELEASE ORAL DAILY
Qty: 30 TABLET | Refills: 2 | Status: SHIPPED | OUTPATIENT
Start: 2025-01-27

## 2025-01-27 RX ORDER — MINERAL OIL/HYDROPHIL PETROLAT
OINTMENT (GRAM) TOPICAL PRN
Qty: 396 G | Refills: 2 | Status: SHIPPED | OUTPATIENT
Start: 2025-01-27

## 2025-01-27 RX ORDER — IBUPROFEN 800 MG/1
800 TABLET, FILM COATED ORAL 2 TIMES DAILY PRN
Qty: 90 TABLET | Refills: 1 | Status: SHIPPED | OUTPATIENT
Start: 2025-01-27

## 2025-01-27 RX ORDER — TRIAMCINOLONE ACETONIDE 5 MG/G
CREAM TOPICAL 2 TIMES DAILY
Qty: 454 G | Refills: 0 | Status: SHIPPED | OUTPATIENT
Start: 2025-01-27

## 2025-01-27 ASSESSMENT — ASTHMA QUESTIONNAIRES
QUESTION_1 LAST FOUR WEEKS HOW MUCH OF THE TIME DID YOUR ASTHMA KEEP YOU FROM GETTING AS MUCH DONE AT WORK, SCHOOL OR AT HOME: NONE OF THE TIME
QUESTION_3 LAST FOUR WEEKS HOW OFTEN DID YOUR ASTHMA SYMPTOMS (WHEEZING, COUGHING, SHORTNESS OF BREATH, CHEST TIGHTNESS OR PAIN) WAKE YOU UP AT NIGHT OR EARLIER THAN USUAL IN THE MORNING: NOT AT ALL
QUESTION_1 LAST FOUR WEEKS HOW MUCH OF THE TIME DID YOUR ASTHMA KEEP YOU FROM GETTING AS MUCH DONE AT WORK, SCHOOL OR AT HOME: NONE OF THE TIME
QUESTION_5 LAST FOUR WEEKS HOW WOULD YOU RATE YOUR ASTHMA CONTROL: COMPLETELY CONTROLLED
QUESTION_4 LAST FOUR WEEKS HOW OFTEN HAVE YOU USED YOUR RESCUE INHALER OR NEBULIZER MEDICATION (SUCH AS ALBUTEROL): NOT AT ALL
QUESTION_2 LAST FOUR WEEKS HOW OFTEN HAVE YOU HAD SHORTNESS OF BREATH: NOT AT ALL
QUESTION_3 LAST FOUR WEEKS HOW OFTEN DID YOUR ASTHMA SYMPTOMS (WHEEZING, COUGHING, SHORTNESS OF BREATH, CHEST TIGHTNESS OR PAIN) WAKE YOU UP AT NIGHT OR EARLIER THAN USUAL IN THE MORNING: NOT AT ALL
QUESTION_2 LAST FOUR WEEKS HOW OFTEN HAVE YOU HAD SHORTNESS OF BREATH: NOT AT ALL
QUESTION_4 LAST FOUR WEEKS HOW OFTEN HAVE YOU USED YOUR RESCUE INHALER OR NEBULIZER MEDICATION (SUCH AS ALBUTEROL): NOT AT ALL
ACT_TOTALSCORE: 25
QUESTION_5 LAST FOUR WEEKS HOW WOULD YOU RATE YOUR ASTHMA CONTROL: COMPLETELY CONTROLLED

## 2025-01-27 NOTE — PROGRESS NOTES
"  Assessment & Plan     Atopic dermatitis, unspecified type  Seasonal allergic rhinitis, unspecified trigger  Pruritic disorder  Pathophysiology of atopic dermatitis, seasonal allergies reviewed with patient.  Recommend avoiding allergic triggers.  Recommend treatment with antihistamine.  Educated patient on emollient therapy, Aquaphor prescribed.  Recommend using topical steroid for flareups.  Follow-up if symptoms do not improve.  Annual blood work obtained  - triamcinolone (ARISTOCORT HP) 0.5 % external cream; Apply topically 2 times daily.  - fexofenadine (ALLEGRA) 180 MG tablet; Take 1 tablet (180 mg) by mouth daily.  - Vitamin D Level; Future  - CBC with platelets differential; Future  - Comprehensive metabolic panel; Future  - Hemoglobin A1c; Future      Prediabetes  A1c ordered to look for any interval changes  - Hemoglobin A1c    Mixed hyperlipidemia  Annual lipid panel obtained.  Advised patient to follow-up in clinic for annual physical after return from [facility]  - Lipid panel; Future    Chronic bilateral low back pain without sciatica  Symptoms well-controlled on current medication.  Medication refilled  - ibuprofen (ADVIL/MOTRIN) 800 MG tablet; Take 1 tablet (800 mg) by mouth 2 times daily as needed for moderate pain.    Gastroesophageal reflux disease without esophagitis  Symptoms well-controlled on current medication.  Medication refilled  - pantoprazole (PROTONIX) 40 MG EC tablet; Take 1 tablet (40 mg) by mouth daily.    Subclinical hypothyroidism  Asymptomatic from a thyroid perspective.  TSH obtained to look for interval change  - TSH WITH FREE T4 REFLEX; Future    BMI  Estimated body mass index is 27.73 kg/m  as calculated from the following:    Height as of this encounter: 1.88 m (6' 2\").    Weight as of this encounter: 98 kg (216 lb).       Return in about 3 months (around 4/27/2025) for Follow up.    Katiana Guzman is a 39 year old, presenting for the following health issues:  Blood Draw " "(Cholesterol, vitamins, blood sugar) and Allergies (Allergy concerns. )      1/27/2025     8:14 AM   Additional Questions   Roomed by Miladys   Accompanied by self         1/27/2025    Information    services provided? Yes   Language Mexican   Type of interpretation provided Telephone    name [facility]    Agency [facility] [facility]     Wants to get blood work today. Wants to get cholesterol, vitamins and blood sugar checked. Usually when travels to [place], has allergies would like some medication for it. Leaves on Sunday. Symptoms of allergy include dry itchy rashes. Has not been diagnosed with vitamin deficiency in the past.    No fever, headaches, chest pain, trouble breathing, cough, vomiting, diarrhea, belly pain, urine symptoms.     Refused vaccination today.  Requesting refill on ibuprofen, pantoprazole.        Objective    /78   Pulse 63   Temp 98  F (36.7  C) (Oral)   Resp 16   Ht 1.88 m (6' 2\")   Wt 98 kg (216 lb)   SpO2 99%   BMI 27.73 kg/m    Body mass index is 27.73 kg/m .  Physical Exam  Constitutional:       Appearance: Normal appearance.   HENT:      Head: Normocephalic and atraumatic.      Right Ear: External ear normal.      Left Ear: External ear normal.   Eyes:      Extraocular Movements: Extraocular movements intact.      Conjunctiva/sclera: Conjunctivae normal.   Cardiovascular:      Rate and Rhythm: Normal rate.   Pulmonary:      Effort: Pulmonary effort is normal.      Breath sounds: Normal breath sounds.   Musculoskeletal:      Cervical back: Normal range of motion.   Skin:     Findings: Rash (dry flaky skin noted on the shins) present. Rash is macular.   Neurological:      General: No focal deficit present.      Mental Status: He is alert.   Psychiatric:         Mood and Affect: Mood normal.         Thought Content: Thought content normal.                Signed Electronically by: Marietta Guillen MD    "

## 2025-02-03 ENCOUNTER — TELEPHONE (OUTPATIENT)
Dept: FAMILY MEDICINE | Facility: CLINIC | Age: 40
End: 2025-02-03
Payer: COMMERCIAL

## 2025-02-03 NOTE — TELEPHONE ENCOUNTER
Attempted to call pt to relay provider message below. No answer, lmtcb.      Eli Keller RN      ----- Message from Marietta Guillen sent at 1/29/2025 11:10 AM CST -----  Team - please call patient with results.    Please notify patient that blood work shows prediabetes, high cholesterol. I recommend working on diet and exercise for this. We can discuss more when patient returns from [place]. He should schedule a clinic visit to discuss this more.     Vitamin D levels are low. I have sent supplement to the pharmacy (once a week for 12 weeks). We should recheck vitamin d again in 3 months.     Otherwise results are within normal limits.     Marietta Guillen MD

## 2025-02-06 NOTE — TELEPHONE ENCOUNTER
Attempted to reach patient. Received the busy tone x3. Confirmed that correct number was called. Lat letter mailed to patient.   Lucina Vernon RN

## 2025-03-05 ENCOUNTER — PATIENT OUTREACH (OUTPATIENT)
Dept: CARE COORDINATION | Facility: CLINIC | Age: 40
End: 2025-03-05
Payer: COMMERCIAL

## 2025-05-19 ENCOUNTER — OFFICE VISIT (OUTPATIENT)
Dept: FAMILY MEDICINE | Facility: CLINIC | Age: 40
End: 2025-05-19
Payer: COMMERCIAL

## 2025-05-19 VITALS
SYSTOLIC BLOOD PRESSURE: 93 MMHG | BODY MASS INDEX: 27.21 KG/M2 | RESPIRATION RATE: 14 BRPM | DIASTOLIC BLOOD PRESSURE: 57 MMHG | HEIGHT: 74 IN | OXYGEN SATURATION: 99 % | HEART RATE: 61 BPM | WEIGHT: 212 LBS | TEMPERATURE: 97.4 F

## 2025-05-19 DIAGNOSIS — R73.03 PRE-DIABETES: ICD-10-CM

## 2025-05-19 DIAGNOSIS — K21.9 GASTROESOPHAGEAL REFLUX DISEASE WITHOUT ESOPHAGITIS: ICD-10-CM

## 2025-05-19 DIAGNOSIS — E55.9 VITAMIN D DEFICIENCY: ICD-10-CM

## 2025-05-19 DIAGNOSIS — Z00.00 ANNUAL PHYSICAL EXAM: Primary | ICD-10-CM

## 2025-05-19 DIAGNOSIS — E78.5 HYPERLIPIDEMIA, UNSPECIFIED HYPERLIPIDEMIA TYPE: ICD-10-CM

## 2025-05-19 DIAGNOSIS — E03.8 SUBCLINICAL HYPOTHYROIDISM: ICD-10-CM

## 2025-05-19 PROBLEM — U07.1 2019 NOVEL CORONAVIRUS DISEASE (COVID-19): Status: RESOLVED | Noted: 2021-07-28 | Resolved: 2025-05-19

## 2025-05-19 PROBLEM — L30.9 ECZEMA: Status: ACTIVE | Noted: 2018-01-21

## 2025-05-19 PROBLEM — K60.2 ANAL FISSURE: Status: RESOLVED | Noted: 2018-01-21 | Resolved: 2025-05-19

## 2025-05-19 LAB
CHOLEST SERPL-MCNC: 188 MG/DL
EST. AVERAGE GLUCOSE BLD GHB EST-MCNC: 120 MG/DL
FASTING STATUS PATIENT QL REPORTED: YES
HBA1C MFR BLD: 5.8 % (ref 0–5.6)
HDLC SERPL-MCNC: 38 MG/DL
LDLC SERPL CALC-MCNC: 128 MG/DL
NONHDLC SERPL-MCNC: 150 MG/DL
TRIGL SERPL-MCNC: 109 MG/DL
VIT D+METAB SERPL-MCNC: 21 NG/ML (ref 20–50)

## 2025-05-19 PROCEDURE — 99396 PREV VISIT EST AGE 40-64: CPT | Performed by: PHYSICIAN ASSISTANT

## 2025-05-19 PROCEDURE — 36415 COLL VENOUS BLD VENIPUNCTURE: CPT | Performed by: PHYSICIAN ASSISTANT

## 2025-05-19 PROCEDURE — 3078F DIAST BP <80 MM HG: CPT | Performed by: PHYSICIAN ASSISTANT

## 2025-05-19 PROCEDURE — 3074F SYST BP LT 130 MM HG: CPT | Performed by: PHYSICIAN ASSISTANT

## 2025-05-19 PROCEDURE — 83036 HEMOGLOBIN GLYCOSYLATED A1C: CPT | Performed by: PHYSICIAN ASSISTANT

## 2025-05-19 PROCEDURE — 80061 LIPID PANEL: CPT | Performed by: PHYSICIAN ASSISTANT

## 2025-05-19 PROCEDURE — 99214 OFFICE O/P EST MOD 30 MIN: CPT | Mod: 25 | Performed by: PHYSICIAN ASSISTANT

## 2025-05-19 PROCEDURE — 82306 VITAMIN D 25 HYDROXY: CPT | Performed by: PHYSICIAN ASSISTANT

## 2025-05-19 RX ORDER — ESOMEPRAZOLE MAGNESIUM 40 MG/1
40 CAPSULE, DELAYED RELEASE ORAL 2 TIMES DAILY
COMMUNITY
Start: 2025-05-15 | End: 2025-05-19

## 2025-05-19 RX ORDER — ESOMEPRAZOLE MAGNESIUM 40 MG/1
40 CAPSULE, DELAYED RELEASE ORAL DAILY PRN
Qty: 90 CAPSULE | Refills: 1 | Status: SHIPPED | OUTPATIENT
Start: 2025-05-19

## 2025-05-19 RX ORDER — EPINEPHRINE 0.3 MG/.3ML
1 INJECTION SUBCUTANEOUS
COMMUNITY
Start: 2024-04-17

## 2025-05-19 RX ORDER — GABAPENTIN 600 MG/1
TABLET ORAL
COMMUNITY
Start: 2025-01-09 | End: 2025-05-19

## 2025-05-19 SDOH — HEALTH STABILITY: PHYSICAL HEALTH: ON AVERAGE, HOW MANY MINUTES DO YOU ENGAGE IN EXERCISE AT THIS LEVEL?: 30 MIN

## 2025-05-19 SDOH — HEALTH STABILITY: PHYSICAL HEALTH: ON AVERAGE, HOW MANY DAYS PER WEEK DO YOU ENGAGE IN MODERATE TO STRENUOUS EXERCISE (LIKE A BRISK WALK)?: 3 DAYS

## 2025-05-19 ASSESSMENT — SOCIAL DETERMINANTS OF HEALTH (SDOH): HOW OFTEN DO YOU GET TOGETHER WITH FRIENDS OR RELATIVES?: ONCE A WEEK

## 2025-05-19 NOTE — PROGRESS NOTES
Prior to immunization administration, verified patients identity using patient s name and date of birth. Please see Immunization Activity for additional information.     Screening Questionnaire for Adult Immunization    Are you sick today?   No   Do you have allergies to medications, food, a vaccine component or latex?   No   Have you ever had a serious reaction after receiving a vaccination?   No   Do you have a long-term health problem with heart, lung, kidney, or metabolic disease (e.g., diabetes), asthma, a blood disorder, no spleen, complement component deficiency, a cochlear implant, or a spinal fluid leak?  Are you on long-term aspirin therapy?   No   Do you have cancer, leukemia, HIV/AIDS, or any other immune system problem?   No   Do you have a parent, brother, or sister with an immune system problem?   No   In the past 3 months, have you taken medications that affect  your immune system, such as prednisone, other steroids, or anticancer drugs; drugs for the treatment of rheumatoid arthritis, Crohn s disease, or psoriasis; or have you had radiation treatments?   No   Have you had a seizure, or a brain or other nervous system problem?   No   During the past year, have you received a transfusion of blood or blood    products, or been given immune (gamma) globulin or antiviral drug?   No   For women: Are you pregnant or is there a chance you could become       pregnant during the next month?   No   Have you received any vaccinations in the past 4 weeks?   No     Immunization questionnaire answers were all negative.      Patient instructed to remain in clinic for 15 minutes afterwards, and to report any adverse reactions.     Screening performed by Luis A Wilkins MA on 5/19/2025 at 8:46 AM.

## 2025-05-19 NOTE — PROGRESS NOTES
"KARIME Sandhu is a 40 year old male who presents for an annual exam.    Other concerns today:  No significant concerns.  We reviewed his labs from January 2025.  He does have mildly elevated A1c and cholesterol.  He would like this rechecked today.  He was also found to have slightly low vitamin D.  He took vitamin D once a week for 3 to 4 months.  Finished prescription.  He would like that rechecked today.    He notes he does not have a history of asthma.  He says when he travels to [place], he tends to get \"allergic reactions\" and therefore has an EpiPen.  Has not needed to use it in the  [place].  Has been seen by allergy in the past.    I reviewed all labs done on 1/27/2025, including A1c, lipid, CMP, CBC, vitamin D, TSH.    Patient Active Problem List    Diagnosis Date Noted    Pre-diabetes 05/19/2025     Priority: Medium    Vitamin D deficiency 05/19/2025     Priority: Medium    Hyperlipidemia 10/13/2022     Priority: Medium    Chronic bilateral low back pain without sciatica 10/17/2019     Priority: Medium    Acquired flat back syndrome 10/17/2019     Priority: Medium     due to work as       Cyst on ear 10/17/2019     Priority: Medium    Other constipation 10/17/2019     Priority: Medium    Subclinical hypothyroidism 10/17/2019     Priority: Medium    Eczema 01/21/2018     Priority: Medium    Gastroesophageal reflux disease 04/05/2016     Priority: Medium        Immunization History   Administered Date(s) Administered    COVID-19 MONOVALENT 12+ (Pfizer) 10/30/2021, 11/20/2021    Hepatitis B Immunity: Titer 04/29/2021       Current Outpatient Medications   Medication Sig Dispense Refill    EPINEPHrine (ANY BX GENERIC EQUIV) 0.3 MG/0.3ML injection 2-pack Inject 1 Pen into the muscle.      esomeprazole (NEXIUM) 40 MG DR capsule Take 1 capsule (40 mg) by mouth daily as needed (GERD symptoms). 90 capsule 1    fexofenadine (ALLEGRA) 180 MG tablet Take 1 tablet (180 mg) by mouth daily. 90 " tablet 0    ibuprofen (ADVIL/MOTRIN) 800 MG tablet Take 1 tablet (800 mg) by mouth 2 times daily as needed for moderate pain. 90 tablet 1    mineral oil-hydrophilic petrolatum (AQUAPHOR) external ointment Apply topically as needed for dry skin or irritation. 396 g 2    triamcinolone (ARISTOCORT HP) 0.5 % external cream Apply topically 2 times daily. 454 g 0    vitamin D3 (CHOLECALCIFEROL) 1.25 MG (11674 UT) capsule Take 1 capsule (50,000 Units) by mouth once a week. 12 capsule 0     No current facility-administered medications for this visit.       Past Medical History:   Diagnosis Date    2019 novel coronavirus disease (COVID-19) 07/28/2021    Diagnosed 2/2021. Sustained symptoms concerning for long-haul COVID-19 from 5/2021-7/2021. Adult Post-COVID referral placed, still pending.       Anal fissure 01/21/2018    Back pain        Past Surgical History:   Procedure Laterality Date    ESOPHAGOSCOPY, GASTROSCOPY, DUODENOSCOPY (EGD), COMBINED N/A 11/1/2021    Procedure: ESOPHAGOGASTRODUODENOSCOPY, WITH BIOPSY;  Surgeon: Davy Mcguire MD;  Location: [facility] OR        No family history on file.           5/19/2025   General Health   How would you rate your overall physical health? Good   Feel stress (tense, anxious, or unable to sleep) Not at all         5/19/2025   Nutrition   Three or more servings of calcium each day? (!) NO   Diet: Low fat/cholesterol   How many servings of fruit and vegetables per day? (!) 0-1   How many sweetened beverages each day? 0-1         5/19/2025   Exercise   Days per week of moderate/strenous exercise 3 days   Average minutes spent exercising at this level 30 min         5/19/2025   Social Factors   Frequency of gathering with friends or relatives Once a week   Worry food won't last until get money to buy more No   Food not last or not have enough money for food? No   Do you have housing? (Housing is defined as stable permanent housing and does not include staying outside in a  "car, in a tent, in an abandoned building, in an overnight shelter, or couch-surfing.) Yes   Are you worried about losing your housing? No   Lack of transportation? No   Unable to get utilities (heat,electricity)? No         5/19/2025   Dental   Dentist two times every year? (!) NO           Today's PHQ-2 Score:       1/27/2025     9:10 AM   PHQ-2 ( 1999 Pfizer)   PHQ-2 Score Incomplete         5/19/2025   Substance Use   Alcohol more than 3/day or more than 7/wk No   Do you use any other substances recreationally? No     Social History     Tobacco Use    Smoking status: Never    Smokeless tobacco: Never   Substance Use Topics    Alcohol use: No    Drug use: No           5/19/2025   STI Screening   New sexual partner(s) since last STI/HIV test? No   ASCVD Risk   The 10-year ASCVD risk score (Christina DODSON, et al., 2019) is: 1.8%    Values used to calculate the score:      Age: 40 years      Sex: Male      Is Non- : Yes      Diabetic: No      Tobacco smoker: No      Systolic Blood Pressure: 93 mmHg      Is BP treated: No      HDL Cholesterol: 46 mg/dL      Total Cholesterol: 208 mg/dL        5/19/2025   Contraception/Family Planning   Questions about contraception or family planning No       Reviewed and updated as needed this visit by Provider                  OBJECTIVE    Vitals:    05/19/25 0845   BP: 93/57   BP Location: Left arm   Patient Position: Sitting   Cuff Size: Adult Large   Pulse: 61   Resp: 14   Temp: 97.4  F (36.3  C)   TempSrc: Temporal   SpO2: 99%   Weight: 96.2 kg (212 lb)   Height: 1.88 m (6' 2\")       Body mass index is 27.22 kg/m .    Physical Exam:  General: patient is alert and oriented x 3, in no apparent distress  HEENT, Thyroid, Lymphatic, Cardiac, Pulmonary, GI, Musculoskeletal, Skin, and Neuro exams were completed today and grossly normal  : Deferred, no concerns      Today's labs pending.      ASSESSMENT and PLAN  1. Annual physical exam (Primary)  Health " maintenance is discussed with patient as appropriate for age and risk factors.  Screening labs ordered and I will follow-up with results.  He declined tetanus shot.  He declined STD screening.    2. Pre-diabetes  Chronic, stable.  A1c checked 6 months ago.  He would like recheck today.  I will follow-up with results.  - Hemoglobin A1c    3. Gastroesophageal reflux disease without esophagitis  Chronic, stable.  He would like a refill of esomeprazole.  Uses this as needed and it works well.  - esomeprazole (NEXIUM) 40 MG DR capsule; Take 1 capsule (40 mg) by mouth daily as needed (GERD symptoms).  Dispense: 90 capsule; Refill: 1    4. Hyperlipidemia, unspecified hyperlipidemia type  Chronic, stable.  History of mild hyperlipidemia.  Last check 6 months ago.  He would like to recheck today.  He is fasting.  - Lipid Profile    5. Vitamin D deficiency  Chronic, stable.  History of slightly low vitamin D 6 months ago.  He did take vitamin D, prescription.  He has finished this.  Will recheck today.  - Vitamin D deficiency screening; Future    6. Subclinical hypothyroidism  Chronic, stable.  Screening thyroid checked 6 months ago was normal.  Continue to recommend annual checks, and less new symptoms arise.        This dictation uses voice recognition software, which may contain typographical errors.

## 2025-05-19 NOTE — PATIENT INSTRUCTIONS
"Patient Education   Talada Daryeelka Ka   Hortaga ah  Bryant waa talo guud oo aan inta badan bixino si aan dadka uga caawino inay caafimaad qabaan. Kooxdaada daryeelka ayaa laga yaabaa inay kuu hayaan talo kuu gaar ah. Fadlan selam hadal kooxdaada daryeelka baahiyahaaga daryeelka ee ka hortaga.  Hab Nololeedka  Samee jimicsi ugu yaraan 150 daqiiqo todobaad kasta (30 daqiiqo maalintii, 5 maalmood todobaadkii).  Samee hawlaha xoojiya murqaha 2 maalmood todobaadkii. Kuwani waxay kaa caawinayaan xakamaynta miisaankaaga iyo ka hortaga cudurada.  Lama ogola sigaar cabista  Xiro muraayadaha qorraxda celiya si aad uga hortagto kansarka maqaarka.  Gurigaaga ha laga julius gaaska radon 2 ilaa 5-tii sanaba mar. Radon waa gaas aan midab lahayn, oo aan ur lahayn oo wax yeeli callie sambabadaada. Si aad wax badan uga ogaato, aad [email or web address]. oo raadi \"Radon in Homes.\"  Hay qoryaha iyagoo aan rasaas ku jirin oo ku xir goob badqab leh sida khasnadda badqab leh ama isticmaal khaanada qoryaha, oo qari furayaasha. Markasta si gooni ah ugu quful khaanad rasaasta. Si aad wax badan uga ogaato, booqo dps.mn.gov oo raadi \"safe gun storage.\"  Nafaqada  Cun 5 cunto ama ka badan oo khudaar iyo gwen ah maalin kasta.  Isku day rootiga qamadiga ka samaysan, bariiska buniga ah iyo baastada (badalkii rootiga cad, bariiska, iyo baasto).  Hel calcium iyo vitamin D kugu filan. Hubi calaamadda cuntooyinka oo ujeedo 100% ee RDA (kaalmada maalinlaha ah ee lagu taleileenyay).  Baaritaano joogta ah  Samee baaritaanka ilkaha iyo nadiifinta 6 bilood kasta.  Arag kooxdaada daryeelka caafimaadka sanad kasta si aad ugala hadasho:  Isbadal kasta oo ku yimaadda caafimaadkaaga.  Daawooyin kasta oo kooxdaada daryeelka kuu qoreen.  Daryeelka ka hortagga, qorshaynta dhalmada qoyska, iyo siyaabaha looga hortago cudurada daba dheeraada.  Talaadarya (talaalaura)   Sheldon HPV (ilaa da'da 26), haddii aadan waligaa hore u qaadanin.  Sheldon jaramillo B (ilaa da'da " 59),haddi aanad hore u qaadanin.  norma COVID-19: Qaado norma marka ay dhamaato.  Norma plattbsantos: qaado sarahmylessantos riccardo sannad kasta.  Norma [place]: Qaado 10-ki sanaba mar.  Dexaafany dsouzaukiitada, cagaarshawa A, iyo [place]alka RSV: Weydii kooxdaada daryeelka haddii aad u baahan tahay kuwaas oo ku salaysan khatarta aad ku jirto.  Norma aguilar (loogu talagaly da'da 50 iyo ka weyn).  [facility]ada [facility]  Baaritaanka sorowga:  Laga bilaabo da'da 35, Iska baar sokorowga ugu yaraan 3 sanaba mar.  Haddii aad ka marcello tahay da'da 35, waydii kooxdaada daryeelka haddii ay tahay in [facility].  Baaritaanka Kol[place]oroolka: Da'da 39, bilow inaad iska baConcordo kolestaroolka 5 sanaba mar, ama marar badan haddii lagu taliyey.  Baaritaanka Cufnaanta Lafta (DEXA): Da'da 50,Waydii kooxdaada daryeelka haddii ay tahay in [facility] baaritaanka jilicnaanta lafaha).  Harika C: Iska baar ugu yaraan emily mar noloshaada.  Baaritaanka god ku yaala Xididka Dhiiga ka Qaada Wadnaha: Selam hadal dhakhtarkaaga baaritaankan haddii aad:  Weligaa Sigaar ma cabtay; oo  Aadiga ma lab tahay; oo  da'da u dhaxayso 65 iyo 75.  Cudurada galmada lagu selam qaado (STIs)  Kahor da'da 24:  Weydii kooxdaada daryeelka haddii ay tahay in [facility] baaro Cudurada galmada [facility] selam qaado (STIs).  Kadib da'da 24: Iska baar Cudurada galmada lagu selam qaado (STIs) haddii aad halis ugu jirto. Aad halis ugu jirto [facility] selam qaado (STIs) (oo ay ku jiraan HIV) haddii:  Hadii aad galmo la samaysay emily qof ka badan.  Aadan isticmaalin cinjirka galmada wakhti kasta.  Adiga ama lamaanahaaga laga helay cur [facility] selam qaado [facility].  Hadii aad halis ugu jirto HIV, wax ka waydii daawada PrEP si aad uga hortagto HIV.  Iska baar HIV ugu yaraan emily mar noloshaada, haddii aad halis ugu jirto HIV iyo haddii kale.  Baaritaanada [place]  Baaritaanada [place] ee Xubinta Taranka [facility]: Haddii aad dumar tahaysvetlana  si joogto ah u hesho baaritaanka [facility] qeybta hore ee ilmo xubinta taranka da'da 21. Inta badan dadka sameeya baaritaanada caadiga ah ee leh natiijooyinka caadiga ah waxay joogsan karaan da'da 65 kadib. Midan selam hadal [facility].  Baaritaanka [facility] naasaha (baaritaanka sawiritaanka ee [facility]): Haddii aad naaso leedahay, bilaw inaad ka baarto naasahaaga [facility] naasaha si joogto ahda'da 40. Kani waa baaritaan raajo oo lagu baaro [facility].  Baaritaanka [facility] xiid-maha waaweyn: Waa muhiim in la bilaabo ba[facility]ank[facility] waaweyn da'da 45.  Samee baaritaanka [facility] malawadka 10-ki sanaba mar (ama in ka badan haddii aad halis ugu jirto) Malcom, weydii bixiyahaaga baaritaanada saxarada sida imtixaanka FIT sanad walba ama baaritaanka [employer] 3-dii sanaba mar.  Si aad wax badan uga barato ikhtiyaroel mletono: [email or web address].  Nickiwimaada go'aan roel santoroo: bit.ly/[email or web address].  Baaritaanka [facility] kaadi mareenka raga: Hadii aad apollo tahay aad jirto da'da 55 ilaa 69, ka codso daryeel bixiyahaaga haddii aad ka faa'iidaysan lahayd baaritaanka [facility] kaadi mareenka ee sannadkiiba mar.  Baaritaanka [facility] Sambabada: Hadii aad hada sigaar cabto ama aad horaan u cabaysay oo aad jirto da'da 50 ilaa 80, ka codso kooxdaada daryeelka haddii baaritaanka [facility] socda uu kugu habboon yahay.    Ujeeddooyin wargelin oo kaliya. Giovanna garnett inay beddel u noqoto talada ashley. Ankurquuqda enriquecacarolinaa   2023 [facility]. [facility] Living[facility]en sargentquuqdu [facility] art panda. Waxaa caafimaad ahaan alexy u eegay [facility] [employer] [id number] - REV 04/24.

## 2025-06-14 ENCOUNTER — RESULTS FOLLOW-UP (OUTPATIENT)
Dept: FAMILY MEDICINE | Facility: CLINIC | Age: 40
End: 2025-06-14

## (undated) DEVICE — ENDO BITE BLOCK ADULT OMNI-BLOC

## (undated) DEVICE — SUCTION CATH AIRLIFE TRI-FLO W/CONTROL PORT 14FR  T60C

## (undated) DEVICE — GOWN IMPERVIOUS 2XL BLUE

## (undated) DEVICE — KIT ENDO TURNOVER/PROCEDURE CARRY-ON 101822

## (undated) DEVICE — ENDO FORCEP BX CAPTURA PRO SPIKE G50696

## (undated) DEVICE — SPECIMEN CONTAINER 3OZ W/FORMALIN 59901

## (undated) DEVICE — TUBING SUCTION 12"X1/4" N612

## (undated) DEVICE — SYR 30ML SLIP TIP W/O NDL 302833

## (undated) DEVICE — SOL WATER IRRIG 500ML BOTTLE 2F7113

## (undated) DEVICE — GLOVE EXAM NITRILE LG PF LATEX FREE 5064

## (undated) DEVICE — SUCTION MANIFOLD NEPTUNE 2 SYS 1 PORT 702-025-000

## (undated) RX ORDER — ALBUTEROL SULFATE 0.83 MG/ML
SOLUTION RESPIRATORY (INHALATION)
Status: DISPENSED
Start: 2022-05-25